# Patient Record
Sex: FEMALE | Race: WHITE | NOT HISPANIC OR LATINO | Employment: OTHER | URBAN - NONMETROPOLITAN AREA
[De-identification: names, ages, dates, MRNs, and addresses within clinical notes are randomized per-mention and may not be internally consistent; named-entity substitution may affect disease eponyms.]

---

## 2017-01-24 NOTE — TELEPHONE ENCOUNTER
Was the patient seen in the last year in this department? yes    Does patient have an active prescription for medications requested? Yes     Received Request Via: Pharmacy

## 2017-01-25 RX ORDER — LEVOTHYROXINE SODIUM 112 UG/1
112 TABLET ORAL
Qty: 30 TAB | Refills: 11 | Status: SHIPPED | OUTPATIENT
Start: 2017-01-25 | End: 2018-01-08 | Stop reason: SDUPTHER

## 2017-02-27 ENCOUNTER — TELEMEDICINE ORIGINATING SITE VISIT (OUTPATIENT)
Dept: MEDICAL GROUP | Facility: CLINIC | Age: 57
End: 2017-02-27
Payer: COMMERCIAL

## 2017-02-27 ENCOUNTER — TELEMEDICINE2 (OUTPATIENT)
Dept: MEDICAL GROUP | Age: 57
End: 2017-02-27
Payer: COMMERCIAL

## 2017-02-27 ENCOUNTER — NON-PROVIDER VISIT (OUTPATIENT)
Dept: MEDICAL GROUP | Facility: CLINIC | Age: 57
End: 2017-02-27
Payer: COMMERCIAL

## 2017-02-27 VITALS
HEART RATE: 66 BPM | BODY MASS INDEX: 40.56 KG/M2 | HEIGHT: 66 IN | SYSTOLIC BLOOD PRESSURE: 126 MMHG | RESPIRATION RATE: 16 BRPM | OXYGEN SATURATION: 93 % | WEIGHT: 252.4 LBS | DIASTOLIC BLOOD PRESSURE: 86 MMHG | TEMPERATURE: 97.4 F

## 2017-02-27 DIAGNOSIS — R73.9 BLOOD GLUCOSE ELEVATED: ICD-10-CM

## 2017-02-27 DIAGNOSIS — E55.9 VITAMIN D DEFICIENCY: ICD-10-CM

## 2017-02-27 DIAGNOSIS — I48.0 PAF (PAROXYSMAL ATRIAL FIBRILLATION) (HCC): ICD-10-CM

## 2017-02-27 DIAGNOSIS — R73.9 HYPERGLYCEMIA: ICD-10-CM

## 2017-02-27 DIAGNOSIS — I25.83 CORONARY ARTERY DISEASE DUE TO LIPID RICH PLAQUE: ICD-10-CM

## 2017-02-27 DIAGNOSIS — I25.10 CORONARY ARTERY DISEASE DUE TO LIPID RICH PLAQUE: ICD-10-CM

## 2017-02-27 DIAGNOSIS — R53.83 FATIGUE, UNSPECIFIED TYPE: ICD-10-CM

## 2017-02-27 DIAGNOSIS — E78.5 DYSLIPIDEMIA: ICD-10-CM

## 2017-02-27 LAB
HBA1C MFR BLD: 6.1 % (ref ?–5.8)
INT CON NEG: NEGATIVE
INT CON POS: POSITIVE

## 2017-02-27 PROCEDURE — 99214 OFFICE O/P EST MOD 30 MIN: CPT | Mod: GT | Performed by: INTERNAL MEDICINE

## 2017-02-27 RX ORDER — EPINEPHRINE 0.3 MG/.3ML
INJECTION INTRAMUSCULAR
Status: ON HOLD | COMMUNITY
Start: 2016-12-27 | End: 2022-09-26

## 2017-02-27 NOTE — PROGRESS NOTES
CC: Here to review labwork    Verified identification with patient. Secured video conference with RN presenter in [facility]      HPI:   Gisel presents today with the following.    1. PAF (paroxysmal atrial fibrillation) (CMS-HCC)  Patient followed up with cardiology in December. Patient stable. Continue current plan of care which includes Lopressor, Lipitor. Lipid panel needed    2. Coronary artery disease due to lipid rich plaque  Stable. Cardiology note reviewed.    3. Dyslipidemia  Taking Lipitor 10 mg daily    4. Hyperglycemia  Diet control. Patient eliminated, soda,, ice cream, or diarrhea. Patient works 4 months out of the year so having difficulty with food prep. Has lost 3 more pounds. Job is sedentary    5. Vitamin D deficiency  Has been taking vitamin D3, 5000 units daily. Ran out of her supplement one week ago. No recent labs          Patient Active Problem List    Diagnosis Date Noted   • Vitamin D deficiency 02/27/2017   • Coronary artery disease, non-occlusive 12/16/2016   • Dyslipidemia 12/16/2016   • Hyperglycemia 10/31/2016   • Acute bronchitis due to infection 10/03/2016   • Hypothyroidism 06/06/2016   • SVT (supraventricular tachycardia) (CMS-HCC) 10/10/2014   • Sleep disorder 06/20/2014   • CAD (coronary artery disease) 12/13/2013   • PAF (paroxysmal atrial fibrillation) (CMS-HCC) 12/13/2013   • Obese 12/13/2013       Current Outpatient Prescriptions   Medication Sig Dispense Refill   • EPIPEN 2-YVROSE 0.3 MG/0.3ML Solution Auto-injector solution for injection      • levothyroxine (SYNTHROID) 112 MCG Tab Take 1 Tab by mouth every morning before breakfast. 30 Tab 11   • AFLURIA Suspension injection      • amoxicillin (AMOXIL) 875 MG tablet Take 1 Tab by mouth 2 times a day. 20 Tab 0   • albuterol 108 (90 BASE) MCG/ACT Aero Soln inhalation aerosol Inhale 2 Puffs by mouth every 6 hours as needed for Shortness of Breath. 8.5 g 1   • metoprolol (LOPRESSOR) 25 MG Tab Take 1 Tab by mouth 2 times a day.  "180 Tab 3   • atorvastatin (LIPITOR) 10 MG Tab Take 1 Tab by mouth every evening. 90 Tab 3   • amoxicillin (AMOXIL) 875 MG tablet Take 1 Tab by mouth 2 times a day. 20 Tab 0   • vitamin D (CHOLECALCIFEROL) 1000 UNIT TABS Take 1,000 Units by mouth every day.     • aspirin (ASA) 81 MG CHEW Take 1 Tab by mouth every day. 100 Tab      No current facility-administered medications for this visit.         Allergies as of 02/27/2017 - [place] as Reviewed 02/27/2017   Allergen Reaction Noted   • Doxycycline  10/10/2013        ROS: As per HPI. Denies cardiopulmonary, GI, neurologic symptoms    /86 mmHg  Pulse 66  Temp(Src) 36.3 °C (97.4 °F)  Resp 16  Ht 1.676 m (5' 5.98\")  Wt 114.488 kg (252 lb 6.4 oz)  BMI 40.76 kg/m2  SpO2 93%    Physical Exam:  Gen:         Alert and oriented, No apparent distress.      Assessment and Plan.   56 y.o. female with the following issues.    1. PAF (paroxysmal atrial fibrillation) (CMS-HCC)  Stable continue current plan of care  Follow up with cardiology in May    2. Coronary artery disease due to lipid rich plaque  Stable, continue current plan of care  Follow-up cardiology  Lipid panel ordered    3. Dyslipidemia    - LIPID PROFILE; Future    4. Hyperglycemia  Hemoglobin A1c 6.1. Down from 6.4. Briefly counseled patient on options for diet and lifestyle medications. Patient will take healthy food options in a cooler. Patient will start bike riding in April. Has a Fitbit, and discussed the goal for 10,000 steps daily  Continue weight loss efforts  Recheck hemoglobin A1c in 6 months    - COMP METABOLIC PANEL; Future    5. Vitamin D deficiency  Will call patient with results to adjust vitamin D supplements    - VITAMIN D,25 HYDROXY; Future    6. Fatigue, unspecified type    - CBC WITH DIFFERENTIAL; Future      7. Prevention. Patient will complete her mammogram within the next month    Total visit time, 25 minutes, time spent in lab review, diet and lifestyle modification counseling in " subspecialty review      Please note that this dictation was created using voice recognition software. I have made every reasonable attempt to correct obvious errors, but expect that there are errors of grammar and possible content that I did not discover before finalizing note.

## 2017-02-27 NOTE — MR AVS SNAPSHOT
"        Gisel Montez   2017 12:00 PM   Telemedicine2   MRN: [id number]    Department:  [address] [place] [address]   Dept Phone:  [phone]    Description:  Female : 1960   Provider:  Mattie Galdamez M.D.; TELEMED [facility]           Reason for Visit     Follow-Up Diabetes    Medication Refill Vitamin D?      Allergies as of 2017     Allergen Noted Reactions    Doxycycline 10/10/2013         You were diagnosed with     PAF (paroxysmal atrial fibrillation) (CMS-[facility])   [963297]       Coronary artery disease due to lipid rich plaque   [7690398]       Dyslipidemia   [881396]       Hyperglycemia   [805253]       Vitamin D deficiency   [6348181]       Fatigue, unspecified type   [0073180]         Vital Signs     Blood Pressure Pulse Temperature Respirations Height Weight    126/86 mmHg 66 36.3 °C (97.4 °F) 16 1.676 m (5' 5.98\") 114.488 kg (252 lb 6.4 oz)    Body Mass Index Oxygen Saturation Smoking Status             40.76 kg/m2 93% Former Smoker         Basic Information     Date Of Birth Sex Race Ethnicity Preferred Language    1960 Female White Non- English      Your appointments     2017 12:30 PM   Non Provider 1 with [place] [facility] (--)    [address] [facility] [address]   [phone]           You will be receiving a confirmation call a few days before your appointment from our automated call confirmation system.            May 05, 2017 10:40 AM   FOLLOW UP with Antonio House M.D.   [facility] -  (--)    [address] [facility] [address]   [phone]            Aug 28, 2017 12:00 PM   Telemedicine Clinic Established Pt with Matt[facility] Galdamze M.D., [facility] [address])    [address] [place] [id number] [phone]              Problem List              ICD-10-CM Priority Class Noted - Resolved    CAD (coronary artery disease) I25.10   2013 - " Present    PAF (paroxysmal atrial fibrillation) (CMS-HCC) I48.0   12/13/2013 - Present    Obese E66.9   12/13/2013 - Present    Sleep disorder G47.9   6/20/2014 - Present    SVT (supraventricular tachycardia) (CMS-HCC) I47.1   10/10/2014 - Present    Hypothyroidism E03.9   6/6/2016 - Present    Acute bronchitis due to infection J20.8   10/3/2016 - Present    Hyperglycemia R73.9   10/31/2016 - Present    Coronary artery disease, non-occlusive I25.10   12/16/2016 - Present    Dyslipidemia E78.5   12/16/2016 - Present    Vitamin D deficiency E55.9   2/27/2017 - Present      Health Maintenance        Date Due Completion Dates    IMM DTaP/Tdap/Td Vaccine (1 - Tdap) 9/9/1980 ---    PAP SMEAR 9/9/1982 ---    IMM INFLUENZA (1) 9/1/2016 10/22/2014, 10/11/2013    MAMMOGRAM 12/15/2016 12/15/2015 (Done)    Override on 12/15/2015: Done    COLONOSCOPY 6/14/2026 6/14/2016 (Done)    Override on 6/14/2016: Done (diverticulosis)            Current Immunizations     Influenza TIV (IM) 10/11/2013  9:28 AM    Influenza Vaccine Quad Inj (Pf) 10/22/2014  8:52 AM      Below and/or attached are the medications your provider expects you to take. Review all of your home medications and newly ordered medications with your provider and/or pharmacist. Follow medication instructions as directed by your provider and/or pharmacist. Please keep your medication list with you and share with your provider. Update the information when medications are discontinued, doses are changed, or new medications (including over-the-counter products) are added; and carry medication information at all times in the event of emergency situations     Allergies:  DOXYCYCLINE - (reactions not documented)               Medications  Valid as of: February 27, 2017 - 12:20 PM    Generic Name Brand Name Tablet Size Instructions for use    Albuterol Sulfate (Aero Soln) albuterol 108 (90 BASE) MCG/ACT Inhale 2 Puffs by mouth every 6 hours as needed for Shortness of Breath.          Amoxicillin (Tab) AMOXIL 875 MG Take 1 Tab by mouth 2 times a day.        Amoxicillin (Tab) AMOXIL 875 MG Take 1 Tab by mouth 2 times a day.        Aspirin (Chew Tab) ASA 81 MG Take 1 Tab by mouth every day.        Atorvastatin Calcium (Tab) LIPITOR 10 MG Take 1 Tab by mouth every evening.        Cholecalciferol (Tab) cholecalciferol 1000 UNIT Take 1,000 Units by mouth every day.        EPINEPHrine (Solution Auto-injector) EPIPEN 2-YVROSE 0.3 MG/0.3ML         Influenza Virus Vaccine Split (Suspension) AFLURIA          Levothyroxine Sodium (Tab) SYNTHROID 112 MCG Take 1 Tab by mouth every morning before breakfast.        Metoprolol Tartrate (Tab) LOPRESSOR 25 MG Take 1 Tab by mouth 2 times a day.        .                 Medicines prescribed today were sent to:     [id number] - [place], [place] - [address]    [address] [place] [address]    Phone: [phone] Fax: [phone]    Open 24 Hours?: No      Medication refill instructions:       If your prescription bottle indicates you have medication refills left, it is not necessary to call your provider’s office. Please contact your pharmacy and they will refill your medication.    If your prescription bottle indicates you do not have any refills left, you may request refills at any time through one of the following ways: The online [employer] system (except Urgent Care), by calling your provider’s office, or by asking your pharmacy to contact your provider’s office with a refill request. Medication refills are processed only during regular business hours and may not be available until the next business day. Your provider may request additional information or to have a follow-up visit with you prior to refilling your medication.   *Please Note: Medication refills are assigned a new Rx number when refilled electronically. Your pharmacy may indicate that no refills were authorized even though a new prescription for the same medication is available at the  pharmacy. Please request the medicine by name with the pharmacy before contacting your provider for a refill.        Your To Do List     Future Labs/Procedures Complete By Expires    CBC WITH DIFFERENTIAL  As directed 2/28/2018    COMP METABOLIC PANEL  As directed 2/28/2018    LIPID PROFILE  As directed 2/28/2018    VITAMIN D,25 HYDROXY  As directed 2/27/2018         MyChart Access Code: Activation code not generated  Current MyChart Status: Active

## 2017-02-27 NOTE — PROGRESS NOTES
Gisel Montez is a 56 y.o. female here for a non-provider visit for A1C    If abnormal was an in office provider notified today (if so, indicate provider)? Yes  Routed to PCP? Yes

## 2017-02-27 NOTE — MR AVS SNAPSHOT
Gisel Montez   2017 12:30 PM   Non-Provider Visit   MRN: [id number]    Department:  [facility]   Dept Phone:  [phone]    Description:  Female : 1960   Provider:  LETTY LINDA           Reason for Visit     Diabetes           Allergies as of 2017     Allergen Noted Reactions    Doxycycline 10/10/2013         You were diagnosed with     Blood glucose elevated   [812745]         Vital Signs     Smoking Status                   Former Smoker           Basic Information     Date Of Birth Sex Race Ethnicity Preferred Language    1960 Female White Non- English      Your appointments     2017 12:30 PM   Non Provider 1 with [place] LINDA   [facility] (--)    [address] [facility] [address] [phone]           You will be receiving a confirmation call a few days before your appointment from our automated call confirmation system.            May 05, 2017 10:40 AM   FOLLOW UP with Antonio House M.D.   [facility] -  (--)    [address] [facility] [id number] [phone]              Problem List              ICD-10-CM Priority Class Noted - Resolved    CAD (coronary artery disease) I25.10   2013 - Present    PAF (paroxysmal atrial fibrillation) (CMS-HCC) I48.0   2013 - Present    Obese E66.9   2013 - Present    Sleep disorder G47.9   2014 - Present    SVT (supraventricular tachycardia) (CMS-HCC) I47.1   10/10/2014 - Present    Hypothyroidism E03.9   2016 - Present    Acute bronchitis due to infection J20.8   10/3/2016 - Present    Hyperglycemia R73.9   10/31/2016 - Present    Coronary artery disease, non-occlusive I25.10   2016 - Present    Dyslipidemia E78.5   2016 - Present    Vitamin D deficiency E55.9   2017 - Present      Health Maintenance        Date Due Completion Dates    IMM DTaP/Tdap/Td Vaccine (1 - Tdap) 1980 ---    PAP SMEAR 1982  ---    IMM INFLUENZA (1) 9/1/2016 10/22/2014, 10/11/2013    MAMMOGRAM 12/15/2016 12/15/2015 (Done)    Override on 12/15/2015: Done    COLONOSCOPY 6/14/2026 6/14/2016 (Done)    Override on 6/14/2016: Done (diverticulosis)            Results     POCT A1C      Component    Glycohemoglobin    6.1    Internal Control Negative    Negative    Internal Control Positive    Positive                        Current Immunizations     Influenza TIV (IM) 10/11/2013  9:28 AM    Influenza Vaccine Quad Inj (Pf) 10/22/2014  8:52 AM      Below and/or attached are the medications your provider expects you to take. Review all of your home medications and newly ordered medications with your provider and/or pharmacist. Follow medication instructions as directed by your provider and/or pharmacist. Please keep your medication list with you and share with your provider. Update the information when medications are discontinued, doses are changed, or new medications (including over-the-counter products) are added; and carry medication information at all times in the event of emergency situations     Allergies:  DOXYCYCLINE - (reactions not documented)               Medications  Valid as of: February 27, 2017 - 12:15 PM    Generic Name Brand Name Tablet Size Instructions for use    Albuterol Sulfate (Aero Soln) albuterol 108 (90 BASE) MCG/ACT Inhale 2 Puffs by mouth every 6 hours as needed for Shortness of Breath.        Amoxicillin (Tab) AMOXIL 875 MG Take 1 Tab by mouth 2 times a day.        Amoxicillin (Tab) AMOXIL 875 MG Take 1 Tab by mouth 2 times a day.        Aspirin (Chew Tab) ASA 81 MG Take 1 Tab by mouth every day.        Atorvastatin Calcium (Tab) LIPITOR 10 MG Take 1 Tab by mouth every evening.        Cholecalciferol (Tab) cholecalciferol 1000 UNIT Take 1,000 Units by mouth every day.        EPINEPHrine (Solution Auto-injector) EPIPEN 2-YVROSE 0.3 MG/0.3ML         Influenza Virus Vaccine Split (Suspension) AFLURIA          Levothyroxine  Sodium (Tab) SYNTHROID 112 MCG Take 1 Tab by mouth every morning before breakfast.        Metoprolol Tartrate (Tab) LOPRESSOR 25 MG Take 1 Tab by mouth 2 times a day.        .                 Medicines prescribed today were sent to:     [facility] - [place], [place] - [address]    [address] [place] [address]    Phone: [phone] Fax: [phone]    Open 24 Hours?: No      Medication refill instructions:       If your prescription bottle indicates you have medication refills left, it is not necessary to call your provider’s office. Please contact your pharmacy and they will refill your medication.    If your prescription bottle indicates you do not have any refills left, you may request refills at any time through one of the following ways: The online [employer] system (except Urgent Care), by calling your provider’s office, or by asking your pharmacy to contact your provider’s office with a refill request. Medication refills are processed only during regular business hours and may not be available until the next business day. Your provider may request additional information or to have a follow-up visit with you prior to refilling your medication.   *Please Note: Medication refills are assigned a new Rx number when refilled electronically. Your pharmacy may indicate that no refills were authorized even though a new prescription for the same medication is available at the pharmacy. Please request the medicine by name with the pharmacy before contacting your provider for a refill.           [employer] Access Code: Activation code not generated  Current [employer] Status: Active

## 2017-04-20 ENCOUNTER — NON-PROVIDER VISIT (OUTPATIENT)
Dept: MEDICAL GROUP | Facility: CLINIC | Age: 57
End: 2017-04-20
Payer: COMMERCIAL

## 2017-04-20 DIAGNOSIS — E78.5 DYSLIPIDEMIA: ICD-10-CM

## 2017-04-20 PROCEDURE — 36415 COLL VENOUS BLD VENIPUNCTURE: CPT | Performed by: FAMILY MEDICINE

## 2017-04-20 NOTE — MR AVS SNAPSHOT
Gisel Montez   2017 8:00 AM   Non-Provider Visit   MRN: [id number]    Department:  [facility]   Dept Phone:  [phone]    Description:  Female : 1960   Provider:  LETTY LINDA           Reason for Visit     Other Venipuncture      Allergies as of 2017     Allergen Noted Reactions    Doxycycline 10/10/2013         You were diagnosed with     Dyslipidemia   [960167]         Vital Signs     Smoking Status                   Former Smoker           Basic Information     Date Of Birth Sex Race Ethnicity Preferred Language    1960 Female White Non- English      Your appointments     2017  8:00 AM   Non Provider 1 with [place] LINDA   [facility] (--)    [address] [facility] [place] [address] [phone]           You will be receiving a confirmation call a few days before your appointment from our automated call confirmation system.            May 05, 2017 10:40 AM   FOLLOW UP with Antonio House M.D.   [facility] -  (--)    [address] [facility] [address] [phone]            Aug 28, 2017 12:00 PM   Telemedicine Clinic Established Pt with Mattie Galdamez M.D., [facility] [address] [place] [id number] [phone]              Problem List              ICD-10-CM Priority Class Noted - Resolved    CAD (coronary artery disease) I25.10   2013 - Present    PAF (paroxysmal atrial fibrillation) (CMS-HCC) I48.0   2013 - Present    Obese E66.9   2013 - Present    Sleep disorder G47.9   2014 - Present    SVT (supraventricular tachycardia) (CMS-HCC) I47.1   10/10/2014 - Present    Hypothyroidism E03.9   2016 - Present    Acute bronchitis due to infection J20.8   10/3/2016 - Present    Hyperglycemia R73.9   10/31/2016 - Present    Coronary artery disease, non-occlusive I25.10   2016 - Present    Dyslipidemia  E78.5   12/16/2016 - Present    Vitamin D deficiency E55.9   2/27/2017 - Present      Health Maintenance        Date Due Completion Dates    IMM DTaP/Tdap/Td Vaccine (1 - Tdap) 9/9/1980 ---    PAP SMEAR 9/9/1982 ---    MAMMOGRAM 12/15/2016 12/15/2015 (Done)    Override on 12/15/2015: Done    COLONOSCOPY 6/14/2026 6/14/2016 (Done)    Override on 6/14/2016: Done (diverticulosis)            Current Immunizations     Influenza TIV (IM) 10/11/2013  9:28 AM    Influenza Vaccine Quad Inj (Pf) 10/22/2014  8:52 AM      Below and/or attached are the medications your provider expects you to take. Review all of your home medications and newly ordered medications with your provider and/or pharmacist. Follow medication instructions as directed by your provider and/or pharmacist. Please keep your medication list with you and share with your provider. Update the information when medications are discontinued, doses are changed, or new medications (including over-the-counter products) are added; and carry medication information at all times in the event of emergency situations     Allergies:  DOXYCYCLINE - (reactions not documented)               Medications  Valid as of: April 20, 2017 -  7:41 AM    Generic Name Brand Name Tablet Size Instructions for use    Albuterol Sulfate (Aero Soln) albuterol 108 (90 BASE) MCG/ACT Inhale 2 Puffs by mouth every 6 hours as needed for Shortness of Breath.        Amoxicillin (Tab) AMOXIL 875 MG Take 1 Tab by mouth 2 times a day.        Amoxicillin (Tab) AMOXIL 875 MG Take 1 Tab by mouth 2 times a day.        Aspirin (Chew Tab) ASA 81 MG Take 1 Tab by mouth every day.        Atorvastatin Calcium (Tab) LIPITOR 10 MG Take 1 Tab by mouth every evening.        Cholecalciferol (Tab) cholecalciferol 1000 UNIT Take 1,000 Units by mouth every day.        EPINEPHrine (Solution Auto-injector) EPIPEN 2-YVROSE 0.3 MG/0.3ML         Influenza Virus Vaccine Split (Suspension) AFLURIA          Levothyroxine Sodium  (Tab) SYNTHROID 112 MCG Take 1 Tab by mouth every morning before breakfast.        Metoprolol Tartrate (Tab) LOPRESSOR 25 MG Take 1 Tab by mouth 2 times a day.        .                 Medicines prescribed today were sent to:     [facility] - [place], [place] - [address]    [address] [place] [address]    Phone: [phone] Fax: [phone]    Open 24 Hours?: No      Medication refill instructions:       If your prescription bottle indicates you have medication refills left, it is not necessary to call your provider’s office. Please contact your pharmacy and they will refill your medication.    If your prescription bottle indicates you do not have any refills left, you may request refills at any time through one of the following ways: The online [employer] system (except Urgent Care), by calling your provider’s office, or by asking your pharmacy to contact your provider’s office with a refill request. Medication refills are processed only during regular business hours and may not be available until the next business day. Your provider may request additional information or to have a follow-up visit with you prior to refilling your medication.   *Please Note: Medication refills are assigned a new Rx number when refilled electronically. Your pharmacy may indicate that no refills were authorized even though a new prescription for the same medication is available at the pharmacy. Please request the medicine by name with the pharmacy before contacting your provider for a refill.           [employer] Access Code: Activation code not generated  Current [employer] Status: Active

## 2017-04-21 LAB
25(OH)D3+25(OH)D2 SERPL-MCNC: 52.1 NG/ML (ref 30–100)
ALBUMIN SERPL-MCNC: 3.9 G/DL (ref 3.5–5.5)
ALBUMIN/GLOB SERPL: 0.9 {RATIO} (ref 1.2–2.2)
ALP SERPL-CCNC: 92 IU/L (ref 39–117)
ALT SERPL-CCNC: 19 IU/L (ref 0–32)
AST SERPL-CCNC: 26 IU/L (ref 0–40)
BASOPHILS # BLD AUTO: 0 X10E3/UL (ref 0–0.2)
BASOPHILS NFR BLD AUTO: 0 %
BILIRUB SERPL-MCNC: 0.5 MG/DL (ref 0–1.2)
BUN SERPL-MCNC: 11 MG/DL (ref 6–24)
BUN/CREAT SERPL: 17 (ref 9–23)
CALCIUM SERPL-MCNC: 8.9 MG/DL (ref 8.7–10.2)
CHD RISK SERPL-RTO: < 0.5 TIMES AVG. (ref 0–1)
CHLORIDE SERPL-SCNC: 103 MMOL/L (ref 96–106)
CHOLEST SERPL-MCNC: 110 MG/DL (ref 100–199)
CHOLEST/HDLC SERPL: 3.1 RATIO UNITS (ref 0–4.4)
COMMENT 011824: ABNORMAL
CREAT SERPL-MCNC: 0.64 MG/DL (ref 0.57–1)
EOSINOPHIL # BLD AUTO: 0.1 X10E3/UL (ref 0–0.4)
EOSINOPHIL NFR BLD AUTO: 1 %
ERYTHROCYTE [DISTWIDTH] IN BLOOD BY AUTOMATED COUNT: 14.3 % (ref 12.3–15.4)
GGT SERPL-CCNC: 22 IU/L (ref 0–60)
GLOBULIN SER CALC-MCNC: 4.5 G/DL (ref 1.5–4.5)
GLUCOSE SERPL-MCNC: 132 MG/DL (ref 65–99)
HCT VFR BLD AUTO: 45 % (ref 34–46.6)
HDLC SERPL-MCNC: 36 MG/DL
HGB BLD-MCNC: 14.9 G/DL (ref 11.1–15.9)
IMM GRANULOCYTES # BLD: 0 X10E3/UL (ref 0–0.1)
IMM GRANULOCYTES NFR BLD: 0 %
IMMATURE CELLS  115398: NORMAL
IRON SERPL-MCNC: 65 UG/DL (ref 27–159)
LDH SERPL-CCNC: 160 IU/L (ref 119–226)
LDLC SERPL CALC-MCNC: 52 MG/DL (ref 0–99)
LYMPHOCYTES # BLD AUTO: 3 X10E3/UL (ref 0.7–3.1)
LYMPHOCYTES NFR BLD AUTO: 40 %
MCH RBC QN AUTO: 29.9 PG (ref 26.6–33)
MCHC RBC AUTO-ENTMCNC: 33.1 G/DL (ref 31.5–35.7)
MCV RBC AUTO: 90 FL (ref 79–97)
MONOCYTES # BLD AUTO: 0.5 X10E3/UL (ref 0.1–0.9)
MONOCYTES NFR BLD AUTO: 6 %
MORPHOLOGY BLD-IMP: NORMAL
NEUTROPHILS # BLD AUTO: 3.8 X10E3/UL (ref 1.4–7)
NEUTROPHILS NFR BLD AUTO: 53 %
NRBC BLD AUTO-RTO: NORMAL %
PHOSPHATE SERPL-MCNC: 3.5 MG/DL (ref 2.5–4.5)
PLATELET # BLD AUTO: 223 X10E3/UL (ref 150–379)
POTASSIUM SERPL-SCNC: 4.6 MMOL/L (ref 3.5–5.2)
PROT SERPL-MCNC: 8.4 G/DL (ref 6–8.5)
RBC # BLD AUTO: 4.98 X10E6/UL (ref 3.77–5.28)
SODIUM SERPL-SCNC: 140 MMOL/L (ref 134–144)
TRIGL SERPL-MCNC: 110 MG/DL (ref 0–149)
URATE SERPL-MCNC: 6.8 MG/DL (ref 2.5–7.1)
VLDLC SERPL CALC-MCNC: 22 MG/DL (ref 5–40)
WBC # BLD AUTO: 7.4 X10E3/UL (ref 3.4–10.8)

## 2017-05-05 ENCOUNTER — OFFICE VISIT (OUTPATIENT)
Dept: CARDIOLOGY | Facility: CLINIC | Age: 57
End: 2017-05-05
Payer: COMMERCIAL

## 2017-05-05 VITALS
HEART RATE: 67 BPM | DIASTOLIC BLOOD PRESSURE: 70 MMHG | BODY MASS INDEX: 37.92 KG/M2 | WEIGHT: 256 LBS | OXYGEN SATURATION: 96 % | SYSTOLIC BLOOD PRESSURE: 100 MMHG | HEIGHT: 69 IN

## 2017-05-05 DIAGNOSIS — E78.5 DYSLIPIDEMIA: ICD-10-CM

## 2017-05-05 DIAGNOSIS — I47.10 SVT (SUPRAVENTRICULAR TACHYCARDIA): ICD-10-CM

## 2017-05-05 DIAGNOSIS — I25.10 CORONARY ARTERY DISEASE, NON-OCCLUSIVE: ICD-10-CM

## 2017-05-05 DIAGNOSIS — R00.2 PALPITATIONS: ICD-10-CM

## 2017-05-05 PROCEDURE — 99214 OFFICE O/P EST MOD 30 MIN: CPT | Performed by: INTERNAL MEDICINE

## 2017-05-05 ASSESSMENT — ENCOUNTER SYMPTOMS
MYALGIAS: 0
SHORTNESS OF BREATH: 0
PND: 0
ORTHOPNEA: 0
PALPITATIONS: 0
DIZZINESS: 0
LOSS OF CONSCIOUSNESS: 0

## 2017-05-05 NOTE — PROGRESS NOTES
Subjective:   Gisel Montez is a 56 y.o. female who presents today for follow up evaluation for CAD, previous inferior myocardial infarction, PAF, SVT, palpitations and hyperlipidemia.    Last seen on 12/16/2016.    Since 12/16/2016 the patient has had no cardiac symptoms. No angina pectoris or palpitations.  Recent lipid panel was normal.    Since 7/21/2016 appointment the patient's had no cardiac symptoms.  Tolerating and compliant with medications.  Follow-up with PCP Dr. Arambula who placed the patient on a diet.  Patient has lost 10 pounds.    Has new PCP with [facility] Dr Lou Arambula    Since last appointment in 7/2015 the patient stopped cigarette 7 months ago.  She didn't along with a friend.  She is also altered her dietary habits.  No new cardiac symptoms.  Still doesn't have a PCP in [place], [place]    Past medical history  Saw Dr.Steve Barton, hematologist for elevated serum globulins.  Being monitored.  Stopped Plavix in May 2015.    On 10/21/2014 seen at [facility].  Chest pain. Normal troponin. Fixed defect on MPI.  Discharged on medications.     On 10/14/2013 discharged from [facility] after an STEMI inferior MI.  Received TNK in [place], [place] and transferred to [facility].  Cardiac catheterization on 10/10/2013 showed mild CAD and inferior wall hypokinesis. EF was 65%..  Subsequently developed atrial fibrillation requiring electrical cardioversion on 10/13/2013.     Past Medical History   Diagnosis Date   • CAD (coronary artery disease)     • Acute MI, inferior wall (CMS-HCC)     • PAF (paroxysmal atrial fibrillation) (CMS-HCC)     • Palpitations     • ACTIVE SMOKER       4-10 sticks/40   • Hypothyroidism     • Cardiomyopathy (CMS-HCC)    • Acute bronchitis due to infection 10/3/2016   • Hyperglycemia 10/31/2016   • Vitamin D deficiency 2/27/2017     Past Surgical History   Procedure Laterality Date   • Gyn surgery       tubal ligation   • Other       choleycystectomy     Family History   Problem  "Relation Age of Onset   • Diabetes Father 68     History   Smoking status   • Former Smoker -- 0.50 packs/day for 35 years   • Types: Cigarettes   • Quit date: 05/29/2015   Smokeless tobacco   • Never Used     Allergies   Allergen Reactions   • Doxycycline      Outpatient Encounter Prescriptions as of 5/5/2017   Medication Sig Dispense Refill   • CINNAMON PO Take  by mouth.     • EPIPEN 2-YVROSE 0.3 MG/0.3ML Solution Auto-injector solution for injection      • levothyroxine (SYNTHROID) 112 MCG Tab Take 1 Tab by mouth every morning before breakfast. 30 Tab 11   • metoprolol (LOPRESSOR) 25 MG Tab Take 1 Tab by mouth 2 times a day. 180 Tab 3   • atorvastatin (LIPITOR) 10 MG Tab Take 1 Tab by mouth every evening. 90 Tab 3   • vitamin D (CHOLECALCIFEROL) 1000 UNIT TABS Take 1,000 Units by mouth every day.     • aspirin (ASA) 81 MG CHEW Take 1 Tab by mouth every day. 100 Tab    • AFLURIA Suspension injection      • amoxicillin (AMOXIL) 875 MG tablet Take 1 Tab by mouth 2 times a day. 20 Tab 0   • albuterol 108 (90 BASE) MCG/ACT Aero Soln inhalation aerosol Inhale 2 Puffs by mouth every 6 hours as needed for Shortness of Breath. 8.5 g 1   • amoxicillin (AMOXIL) 875 MG tablet Take 1 Tab by mouth 2 times a day. 20 Tab 0     No facility-administered encounter medications on file as of 5/5/2017.     Review of Systems   Respiratory: Negative for shortness of breath.    Cardiovascular: Negative for chest pain, palpitations, orthopnea, leg swelling and PND.   Musculoskeletal: Negative for myalgias.   Neurological: Negative for dizziness and loss of consciousness.        Objective:   /70 mmHg  Pulse 67  Ht 1.753 m (5' 9.02\")  Wt 116.121 kg (256 lb)  BMI 37.79 kg/m2  SpO2 96%    Physical Exam   Constitutional: She is oriented to person, place, and time. She appears well-nourished. No distress.   HENT:   Mouth/Throat: Mucous membranes are normal.   Neck: No JVD present. Carotid bruit is not present.   Normal jugular venous " pressure.   Cardiovascular: Normal rate, regular rhythm, S1 normal and S2 normal.  Exam reveals no gallop and no friction rub.    No murmur heard.  Pulses:       Carotid pulses are 2+ on the right side, and 2+ on the left side.       Radial pulses are 2+ on the right side, and 2+ on the left side.        Posterior tibial pulses are 1+ on the right side, and 1+ on the left side.       Pulmonary/Chest: Effort normal and breath sounds normal. She has no wheezes. She has no rhonchi. She has no rales.   Increased AP diameter.   Musculoskeletal: She exhibits no edema.   Neurological: She is alert and oriented to person, place, and time.   Skin: Skin is warm and dry. No cyanosis. Nails show no clubbing.   Psychiatric: She has a normal mood and affect. Her behavior is normal.   10/13/2013 EKG: normal sinus rhythm, rate 75. Inferolateral myocardial infarction. Low voltage. EKG reviewed by myself.      Ref. Range 10/11/2013 03:38   TSH Latest Range: 0.300-3.700 uIU/mL 1.090      Ref. Range 4/20/2017 07:30   Cholesterol,Tot Latest Ref Range: 100-199 mg/dL 110   Triglycerides Latest Ref Range: 0-149 mg/dL 110   HDL Latest Ref Range: >39 mg/dL 36 (L)   LDL Latest Ref Range: 0-99 mg/dL 52       11/04/2014 Lab: AST 25  ALT 26  TP 8.5 GLOBULIN 5.2 ALB 3.3  12/17/2015 Lab: Cholesterol 104. Triglycerides 108. HDL 36. LDL 68.    10/12/2013 Echocardiogram  Left ventricular ejection fraction is 55% to 60%.  Mild concentric left ventricular hypertrophy.  Mildly dilated left atrium.  Aortic sclerosis without stenosis.  Right ventricular systolic pressure is estimated to be 25 mmhg.    10/10/2013 Chest CTA  1. No pulmonary embolism  2. Mild atelectasis  3. Previous cholecystectomy  4. Atherosclerosis    2014 Cardiac event recorder: [employer]. 4 beasts SVT. 4 beats VT.       Assessment:     1. Coronary artery disease, non-occlusive     2. Dyslipidemia     3. SVT (supraventricular tachycardia)     4. Palpitations          Medical Decision  Making:  Today's Assessment / Status / Plan:      1. MI. 10/10/2013. Unclear mechanism. ? Cardiac embolic due to PAF.      2. PAF. Continue current therapy. On aspirin. Now off plavix.    3. Palpitations. Resolved.     4. Hyperlipidemia. LDL at goal.      5. SVT. No clinical recurrence.    6. VT. Continue beta blocker therapy.    7. Sleep disorder.      8. Elevated LFTs in 10/2014 now resolved.    9. Elevated globulin. Per Dr Barton.      10. Obesity. Losing weight.    11. Tobacco use. Stopped 7 months ago.    Recommendations  Review of most recent lipid panel with patient.  Advised to follow-up with Dr. Barton, hematologist.  Continue current therapy.  Follow-up 6 months.

## 2017-05-05 NOTE — Clinical Note
[facility]   [address] [place] [address]  Phone: [phone]  Fax: [phone]              Gisel Montez  1960    Encounter Date: 5/5/2017    Antonio House M.D.          PROGRESS NOTE:  Subjective:   Gisel Montez is a 56 y.o. female who presents today for follow up evaluation for CAD, previous inferior myocardial infarction, PAF, SVT, palpitations and hyperlipidemia.    Last seen on 12/16/2016.    Since 12/16/2016 the patient has had no cardiac symptoms. No angina pectoris or palpitations.  Recent lipid panel was normal.    Since 7/21/2016 appointment the patient's had no cardiac symptoms.  Tolerating and compliant with medications.  Follow-up with PCP Dr. Arambula who placed the patient on a diet.  Patient has lost 10 pounds.    Has new PCP with [facility] Dr Lou Arambula    Since last appointment in 7/2015 the patient stopped cigarette 7 months ago.  She didn't along with a friend.  She is also altered her dietary habits.  No new cardiac symptoms.  Still doesn't have a PCP in [place], [place]    Past medical history  Saw Dr.Steve Barton, hematologist for elevated serum globulins.  Being monitored.  Stopped Plavix in May 2015.    On 10/21/2014 seen at [facility].  Chest pain. Normal troponin. Fixed defect on MPI.  Discharged on medications.     On 10/14/2013 discharged from [facility] after an STEMI inferior MI.  Received TNK in [place], [place] and transferred to [facility].  Cardiac catheterization on 10/10/2013 showed mild CAD and inferior wall hypokinesis. EF was 65%..  Subsequently developed atrial fibrillation requiring electrical cardioversion on 10/13/2013.     Past Medical History   Diagnosis Date   • CAD (coronary artery disease)     • Acute MI, inferior wall (CMS-HCC)     • PAF (paroxysmal atrial fibrillation) (CMS-HCC)     • Palpitations     • ACTIVE SMOKER       4-10 sticks/40   • Hypothyroidism     • Cardiomyopathy (CMS-HCC)    •  "Acute bronchitis due to infection 10/3/2016   • Hyperglycemia 10/31/2016   • Vitamin D deficiency 2/27/2017     Past Surgical History   Procedure Laterality Date   • Gyn surgery       tubal ligation   • Other       choleycystectomy     Family History   Problem Relation Age of Onset   • Diabetes Father 68     History   Smoking status   • Former Smoker -- 0.50 packs/day for 35 years   • Types: Cigarettes   • Quit date: 05/29/2015   Smokeless tobacco   • Never Used     Allergies   Allergen Reactions   • Doxycycline      Outpatient Encounter Prescriptions as of 5/5/2017   Medication Sig Dispense Refill   • CINNAMON PO Take  by mouth.     • EPIPEN 2-YVROSE 0.3 MG/0.3ML Solution Auto-injector solution for injection      • levothyroxine (SYNTHROID) 112 MCG Tab Take 1 Tab by mouth every morning before breakfast. 30 Tab 11   • metoprolol (LOPRESSOR) 25 MG Tab Take 1 Tab by mouth 2 times a day. 180 Tab 3   • atorvastatin (LIPITOR) 10 MG Tab Take 1 Tab by mouth every evening. 90 Tab 3   • vitamin D (CHOLECALCIFEROL) 1000 UNIT TABS Take 1,000 Units by mouth every day.     • aspirin (ASA) 81 MG CHEW Take 1 Tab by mouth every day. 100 Tab    • AFLURIA Suspension injection      • amoxicillin (AMOXIL) 875 MG tablet Take 1 Tab by mouth 2 times a day. 20 Tab 0   • albuterol 108 (90 BASE) MCG/ACT Aero Soln inhalation aerosol Inhale 2 Puffs by mouth every 6 hours as needed for Shortness of Breath. 8.5 g 1   • amoxicillin (AMOXIL) 875 MG tablet Take 1 Tab by mouth 2 times a day. 20 Tab 0     No facility-administered encounter medications on file as of 5/5/2017.     Review of Systems   Respiratory: Negative for shortness of breath.    Cardiovascular: Negative for chest pain, palpitations, orthopnea, leg swelling and PND.   Musculoskeletal: Negative for myalgias.   Neurological: Negative for dizziness and loss of consciousness.        Objective:   /70 mmHg  Pulse 67  Ht 1.753 m (5' 9.02\")  Wt 116.121 kg (256 lb)  BMI 37.79 kg/m2  " SpO2 96%    Physical Exam   Constitutional: She is oriented to person, place, and time. She appears well-nourished. No distress.   HENT:   Mouth/Throat: Mucous membranes are normal.   Neck: No JVD present. Carotid bruit is not present.   Normal jugular venous pressure.   Cardiovascular: Normal rate, regular rhythm, S1 normal and S2 normal.  Exam reveals no gallop and no friction rub.    No murmur heard.  Pulses:       Carotid pulses are 2+ on the right side, and 2+ on the left side.       Radial pulses are 2+ on the right side, and 2+ on the left side.        Posterior tibial pulses are 1+ on the right side, and 1+ on the left side.       Pulmonary/Chest: Effort normal and breath sounds normal. She has no wheezes. She has no rhonchi. She has no rales.   Increased AP diameter.   Musculoskeletal: She exhibits no edema.   Neurological: She is alert and oriented to person, place, and time.   Skin: Skin is warm and dry. No cyanosis. Nails show no clubbing.   Psychiatric: She has a normal mood and affect. Her behavior is normal.   10/13/2013 EKG: normal sinus rhythm, rate 75. Inferolateral myocardial infarction. Low voltage. EKG reviewed by myself.      Ref. Range 10/11/2013 03:38   TSH Latest Range: 0.300-3.700 uIU/mL 1.090      Ref. Range 4/20/2017 07:30   Cholesterol,Tot Latest Ref Range: 100-199 mg/dL 110   Triglycerides Latest Ref Range: 0-149 mg/dL 110   HDL Latest Ref Range: >39 mg/dL 36 (L)   LDL Latest Ref Range: 0-99 mg/dL 52       11/04/2014 Lab: AST 25  ALT 26  TP 8.5 GLOBULIN 5.2 ALB 3.3  12/17/2015 Lab: Cholesterol 104. Triglycerides 108. HDL 36. LDL 68.    10/12/2013 Echocardiogram  Left ventricular ejection fraction is 55% to 60%.  Mild concentric left ventricular hypertrophy.  Mildly dilated left atrium.  Aortic sclerosis without stenosis.  Right ventricular systolic pressure is estimated to be 25 mmhg.    10/10/2013 Chest CTA  1. No pulmonary embolism  2. Mild atelectasis  3. Previous cholecystectomy  4.  Atherosclerosis    2014 Cardiac event recorder: [employer]. 4 beasts SVT. 4 beats VT.       Assessment:     1. Coronary artery disease, non-occlusive     2. Dyslipidemia     3. SVT (supraventricular tachycardia)     4. Palpitations          Medical Decision Making:  Today's Assessment / Status / Plan:      1. MI. 10/10/2013. Unclear mechanism. ? Cardiac embolic due to PAF.      2. PAF. Continue current therapy. On aspirin. Now off plavix.    3. Palpitations. Resolved.     4. Hyperlipidemia. LDL at goal.      5. SVT. No clinical recurrence.    6. VT. Continue beta blocker therapy.    7. Sleep disorder.      8. Elevated LFTs in 10/2014 now resolved.    9. Elevated globulin. Per Dr Barton.      10. Obesity. Losing weight.    11. Tobacco use. Stopped 7 months ago.    Recommendations  Review of most recent lipid panel with patient.  Advised to follow-up with Dr. Barton, hematologist.  Continue current therapy.  Follow-up 6 months.           Mattie Galdamez M.D.  [address]  [address]  [facility] [address]  VIA In Basket

## 2017-05-05 NOTE — MR AVS SNAPSHOT
"Gisel Montez   2017 10:40 AM   Office Visit   MRN: [id number]    Department:  [facility]   Dept Phone:  [phone]    Description:  Female : 1960   Provider:  Antonio House M.D.           Reason for Visit     Follow-Up           Allergies as of 2017     Allergen Noted Reactions    Doxycycline 10/10/2013         You were diagnosed with     Coronary artery disease, non-occlusive   [120579]       Dyslipidemia   [718079]       SVT (supraventricular tachycardia)   [962304]       Palpitations   [785.1.ICD-9-CM]         Vital Signs     Blood Pressure Pulse Height Weight Body Mass Index Oxygen Saturation    100/70 mmHg 67 1.753 m (5' 9.02\") 116.121 kg (256 lb) 37.79 kg/m2 96%    Smoking Status                   Former Smoker           Basic Information     Date Of Birth Sex Race Ethnicity Preferred Language    1960 Female White Non- English      Your appointments     Aug 28, 2017 12:00 PM   Telemedicine Clinic Established Pt with Mattie Galdamez M.D., TELEMED [address] [id number] [phone]              Problem List              ICD-10-CM Priority Class Noted - Resolved    PAF (paroxysmal atrial fibrillation) (CMS-HCC) I48.0   2013 - Present    Obese E66.9   2013 - Present    Sleep disorder G47.9   2014 - Present    SVT (supraventricular tachycardia) I47.1   10/10/2014 - Present    Hypothyroidism E03.9   2016 - Present    Acute bronchitis due to infection J20.8   10/3/2016 - Present    Hyperglycemia R73.9   10/31/2016 - Present    Coronary artery disease, non-occlusive I25.10   2016 - Present    Dyslipidemia E78.5   2016 - Present    Vitamin D deficiency E55.9   2017 - Present    Palpitations R00.2   2017 - Present      Health Maintenance        Date Due Completion Dates    IMM DTaP/Tdap/Td Vaccine (1 - Tdap) 1980 ---    PAP SMEAR 1982 ---   " MAMMOGRAM 12/15/2016 12/15/2015 (Done)    Override on 12/15/2015: Done    COLONOSCOPY 6/14/2026 6/14/2016 (Done)    Override on 6/14/2016: Done (diverticulosis)            Current Immunizations     Influenza TIV (IM) 10/11/2013  9:28 AM    Influenza Vaccine Quad Inj (Pf) 10/22/2014  8:52 AM      Below and/or attached are the medications your provider expects you to take. Review all of your home medications and newly ordered medications with your provider and/or pharmacist. Follow medication instructions as directed by your provider and/or pharmacist. Please keep your medication list with you and share with your provider. Update the information when medications are discontinued, doses are changed, or new medications (including over-the-counter products) are added; and carry medication information at all times in the event of emergency situations     Allergies:  DOXYCYCLINE - (reactions not documented)               Medications  Valid as of: May 08, 2017 -  8:15 AM    Generic Name Brand Name Tablet Size Instructions for use    Albuterol Sulfate (Aero Soln) albuterol 108 (90 BASE) MCG/ACT Inhale 2 Puffs by mouth every 6 hours as needed for Shortness of Breath.        Amoxicillin (Tab) AMOXIL 875 MG Take 1 Tab by mouth 2 times a day.        Amoxicillin (Tab) AMOXIL 875 MG Take 1 Tab by mouth 2 times a day.        Aspirin (Chew Tab) ASA 81 MG Take 1 Tab by mouth every day.        Atorvastatin Calcium (Tab) LIPITOR 10 MG Take 1 Tab by mouth every evening.        Cholecalciferol (Tab) cholecalciferol 1000 UNIT Take 1,000 Units by mouth every day.        Cinnamon   Take  by mouth.        EPINEPHrine (Solution Auto-injector) EPIPEN 2-YVROSE 0.3 MG/0.3ML         Influenza Virus Vaccine Split (Suspension) AFLURIA          Levothyroxine Sodium (Tab) SYNTHROID 112 MCG Take 1 Tab by mouth every morning before breakfast.        Metoprolol Tartrate (Tab) LOPRESSOR 25 MG Take 1 Tab by mouth 2 times a day.        .                    Medicines prescribed today were sent to:     [id number] - [place], [place] - [address]    [address] [place] [address]    Phone: [phone] Fax: [phone]    Open 24 Hours?: No      Medication refill instructions:       If your prescription bottle indicates you have medication refills left, it is not necessary to call your provider’s office. Please contact your pharmacy and they will refill your medication.    If your prescription bottle indicates you do not have any refills left, you may request refills at any time through one of the following ways: The online [employer] system (except Urgent Care), by calling your provider’s office, or by asking your pharmacy to contact your provider’s office with a refill request. Medication refills are processed only during regular business hours and may not be available until the next business day. Your provider may request additional information or to have a follow-up visit with you prior to refilling your medication.   *Please Note: Medication refills are assigned a new Rx number when refilled electronically. Your pharmacy may indicate that no refills were authorized even though a new prescription for the same medication is available at the pharmacy. Please request the medicine by name with the pharmacy before contacting your provider for a refill.           [employer] Access Code: Activation code not generated  Current [employer] Status: Active

## 2017-08-28 ENCOUNTER — TELEMEDICINE2 (OUTPATIENT)
Dept: MEDICAL GROUP | Age: 57
End: 2017-08-28
Payer: COMMERCIAL

## 2017-08-28 ENCOUNTER — NON-PROVIDER VISIT (OUTPATIENT)
Dept: MEDICAL GROUP | Facility: CLINIC | Age: 57
End: 2017-08-28
Payer: COMMERCIAL

## 2017-08-28 ENCOUNTER — TELEMEDICINE ORIGINATING SITE VISIT (OUTPATIENT)
Dept: MEDICAL GROUP | Facility: CLINIC | Age: 57
End: 2017-08-28
Payer: COMMERCIAL

## 2017-08-28 VITALS
DIASTOLIC BLOOD PRESSURE: 80 MMHG | OXYGEN SATURATION: 97 % | WEIGHT: 266 LBS | TEMPERATURE: 97.8 F | BODY MASS INDEX: 39.4 KG/M2 | RESPIRATION RATE: 16 BRPM | SYSTOLIC BLOOD PRESSURE: 132 MMHG | HEART RATE: 71 BPM | HEIGHT: 69 IN

## 2017-08-28 DIAGNOSIS — R73.9 HYPERGLYCEMIA: ICD-10-CM

## 2017-08-28 DIAGNOSIS — I25.10 CORONARY ARTERY DISEASE, NON-OCCLUSIVE: ICD-10-CM

## 2017-08-28 DIAGNOSIS — E11.9 TYPE 2 DIABETES MELLITUS WITHOUT COMPLICATION, WITHOUT LONG-TERM CURRENT USE OF INSULIN (HCC): ICD-10-CM

## 2017-08-28 DIAGNOSIS — E66.01 MORBID OBESITY DUE TO EXCESS CALORIES (HCC): ICD-10-CM

## 2017-08-28 LAB
HBA1C MFR BLD: 6.5 % (ref ?–5.8)
INT CON NEG: NEGATIVE
INT CON POS: POSITIVE

## 2017-08-28 PROCEDURE — 99214 OFFICE O/P EST MOD 30 MIN: CPT | Mod: GT | Performed by: INTERNAL MEDICINE

## 2017-08-28 ASSESSMENT — PATIENT HEALTH QUESTIONNAIRE - PHQ9: CLINICAL INTERPRETATION OF PHQ2 SCORE: 0

## 2017-08-28 NOTE — PATIENT INSTRUCTIONS
1800 Calorie Diet for Diabetes Meal Planning  The 1800 calorie diet is designed for eating up to 1800 calories each day. Following this diet and making healthy meal choices can help improve overall health. This diet controls blood sugar (glucose) levels and can also help lower blood pressure and cholesterol.  SERVING SIZES  Measuring foods and serving sizes helps to make sure you are getting the right amount of food. The list below tells how big or small some common serving sizes are:  · 1 oz.........4 stacked dice.   · 3 oz.........Deck of cards.   · 1 tsp........Tip of little finger.   · 1 tbs........Thumb.   · 2 tbs........Golf ball.   · ½ cup.......Half of a fist.   · 1 cup........A fist.   GUIDELINES FOR CHOOSING FOODS  The goal of this diet is to eat a variety of foods and limit calories to 1800 each day. This can be done by choosing foods that are low in calories and fat. The diet also suggests eating small amounts of food frequently. Doing this helps control your blood glucose levels so they do not get too high or too low. Each meal or snack may include a protein food source to help you feel more satisfied and to stabilize your blood glucose. Try to eat about the same amount of food around the same time each day. This includes weekend days, travel days, and days off work. Space your meals about 4 to 5 hours apart and add a snack between them if you wish.   For example, a daily food plan could include breakfast, a morning snack, lunch, dinner, and an evening snack. Healthy meals and snacks include whole grains, vegetables, fruits, lean meats, poultry, fish, and dairy products. As you plan your meals, select a variety of foods. Choose from the bread and starch, vegetable, fruit, dairy, and meat/protein groups. Examples of foods from each group and their suggested serving sizes are listed below. Use measuring cups and spoons to become familiar with what a healthy portion looks like.  Bread and Starch  Each  serving equals 15 grams of carbohydrates.  · 1 slice bread.   · ¼ bagel.   · ¾ cup cold cereal (unsweetened).   · ½ cup hot cereal or mashed potatoes.   · 1 small potato (size of a computer mouse).   ·  cup cooked pasta or rice.   · ½ English muffin.   · 1 cup broth-based soup.   · 3 cups of popcorn.   · 4 to 6 whole-wheat crackers.   · ½ cup cooked beans, peas, or corn.   Vegetable  Each serving equals 5 grams of carbohydrates.  · ½ cup cooked vegetables.   · 1 cup raw vegetables.   · ½ cup tomato or vegetable juice.   Fruit  Each serving equals 15 grams of carbohydrates.  · 1 small apple or orange.   · 1¼ cup watermelon or strawberries.   · ½ cup applesauce (no sugar added).   · 2 tbs raisins.   · ½ banana.   · ½ cup canned fruit, packed in water, its own juice, or sweetened with a sugar substitute.   · ½ cup unsweetened fruit juice.   Dairy  Each serving equals 12 to 15 grams of carbohydrates.  · 1 cup fat-free milk.   · 6 oz artificially sweetened yogurt or plain yogurt.   · 1 cup low-fat buttermilk.   · 1 cup soy milk.   · 1 cup almond milk.   Meat/Protein  · 1 large egg.   · 2 to 3 oz meat, poultry, or fish.   · ¼ cup low-fat cottage cheese.   · 1 tbs peanut butter.   · 1 oz low-fat cheese.   · ¼ cup tuna in water.   · ½ cup tofu.   Fat  · 1 tsp oil.   · 1 tsp trans-fat-free margarine.   · 1 tsp butter.   · 1 tsp mayonnaise.   · 2 tbs avocado.   · 1 tbs salad dressing.   · 1 tbs cream cheese.   · 2 tbs sour cream.   SAMPLE 1800 CALORIE DIET PLAN  Breakfast  · ¾ cup unsweetened cereal (1 carb serving).   · 1 cup fat-free milk (1 carb serving).   · 1 slice whole-wheat toast (1 carb serving).   · ½ small banana (1 carb serving).   · 1 scrambled egg.   · 1 tsp trans-fat-free margarine.   Lunch  · Tuna sandwich.   · 2 slices whole-wheat bread (2 carb servings).   · ½ cup canned tuna in water, drained.   · 1 tbs reduced fat mayonnaise.   · 1 stalk celery, chopped.   · 2 slices tomato.   · 1 lettuce leaf.   · 1 cup  carrot sticks.   · 24 to 30 seedless grapes (2 carb servings).   · 6 oz light yogurt (1 carb serving).   Afternoon Snack  · 3 carl cracker squares (1 carb serving).   · Fat-free milk, 1 cup (1 carb serving).   · 1 tbs peanut butter.   Dinner  · 3 oz salmon, broiled with 1 tsp oil.   · 1 cup mashed potatoes (2 carb servings) with 1 tsp trans-fat-free margarine.   · 1 cup fresh or frozen green beans.   · 1 cup steamed asparagus.   · 1 cup fat-free milk (1 carb serving).   Evening Snack  · 3 cups air-popped popcorn (1 carb serving).   · 2 tbs parmesan cheese sprinkled on top.   MEAL PLAN  Use this worksheet to help you make a daily meal plan based on the 1800 calorie diet suggestions. If you are using this plan to help you control your blood glucose, you may interchange carbohydrate-containing foods (dairy, starches, and fruits). Select a variety of fresh foods of varying colors and flavors. The total amount of carbohydrate in your meals or snacks is more important than making sure you include all of the food groups every time you eat. Choose from the following foods to build your day's meals:  · 8 Starches.   · 4 Vegetables.   · 3 Fruits.   · 2 Dairy.   · 6 to 7 oz Meat/Protein.   · Up to 4 Fats.   Your dietician can use this worksheet to help you decide how many servings and which types of foods are right for you.  BREAKFAST  Food Group and Servings / Food Choice  Starch ________________________________________________________  Dairy _________________________________________________________  Fruit _________________________________________________________  Meat/Protein __________________________________________________  Fat ___________________________________________________________  LUNCH  Food Group and Servings / Food Choice  Starch ________________________________________________________  Meat/Protein __________________________________________________  Vegetable  _____________________________________________________  Fruit _________________________________________________________  Dairy _________________________________________________________  Fat ___________________________________________________________  AFTERNOON SNACK  Food Group and Servings / Food Choice  Starch ________________________________________________________  Meat/Protein __________________________________________________  Fruit __________________________________________________________  Dairy _________________________________________________________  DINNER  Food Group and Servings / Food Choice  Starch _________________________________________________________  Meat/Protein ___________________________________________________  Dairy __________________________________________________________  Vegetable ______________________________________________________  Fruit ___________________________________________________________  Fat ____________________________________________________________  EVENING SNACK  Food Group and Servings / Food Choice  Fruit __________________________________________________________  Meat/Protein ___________________________________________________  Dairy __________________________________________________________  Starch _________________________________________________________  DAILY TOTALS  Starch ____________________________  Vegetable _________________________  Fruit _____________________________  Dairy _____________________________  Meat/Protein______________________  Fat _______________________________  Document Released: 07/10/2006 Document Revised: 03/11/2013 Document Reviewed: 11/02/2012  ExitCare® Patient Information ©2013 [facility].

## 2017-08-28 NOTE — PROGRESS NOTES
CC: Three-month follow-up visit    Verified identification with patient. Secured video conference with RN presenter in [facility]      HPI:   Gisel presents today with the following.    1. Type 2 diabetes mellitus without complication, without long-term current use of insulin (CMS-HCC)  Patient was last seen in May 2017. Hemoglobin A1c 6.1. Hemoglobin A1c today 6.5. Patient has gained 14 pounds since her last visit. Patient states that she has been camping and not following a diabetic diet. However she has discontinued sweets soda and juice. Patient states that she exercises 3 times a week with walking or bicycling. Patient does not take diabetic medications, which is to continue with diet management at this point.    2. Morbid obesity due to excess calories (CMS-HCC)  Patient has not been following her regular exercise and diet team due to summer vacationing. Patient has gained 14 pounds since last visit. BMI 39.28    3. Coronary artery disease, non-occlusive  Patient was seen by her cardiologist, Dr. Fernández In May. No new medication changes. Patient is stable. Patient denies cardiac symptoms including palpitations, chest pain, shortness of breath, dizziness or edema. Follow-up with cardiology per patient is within one year. Patient is currently on aspirin, discontinued Plavix. Takes Lipitor 10 mg daily. LDL at goal per cardiology.      Patient Active Problem List    Diagnosis Date Noted   • Type 2 diabetes mellitus without complication (CMS-HCC) 08/28/2017   • Palpitations 05/05/2017   • Vitamin D deficiency 02/27/2017   • Coronary artery disease, non-occlusive 12/16/2016   • Dyslipidemia 12/16/2016   • Hyperglycemia 10/31/2016   • Acute bronchitis due to infection 10/03/2016   • Hypothyroidism 06/06/2016   • SVT (supraventricular tachycardia) 10/10/2014   • Sleep disorder 06/20/2014   • PAF (paroxysmal atrial fibrillation) (CMS-HCC) 12/13/2013   • Obese 12/13/2013       Current Outpatient Prescriptions  "  Medication Sig Dispense Refill   • CINNAMON PO Take  by mouth.     • EPIPEN 2-YVROSE 0.3 MG/0.3ML Solution Auto-injector solution for injection      • levothyroxine (SYNTHROID) 112 MCG Tab Take 1 Tab by mouth every morning before breakfast. 30 Tab 11   • AFLURIA Suspension injection      • amoxicillin (AMOXIL) 875 MG tablet Take 1 Tab by mouth 2 times a day. 20 Tab 0   • albuterol 108 (90 BASE) MCG/ACT Aero Soln inhalation aerosol Inhale 2 Puffs by mouth every 6 hours as needed for Shortness of Breath. 8.5 g 1   • metoprolol (LOPRESSOR) 25 MG Tab Take 1 Tab by mouth 2 times a day. 180 Tab 3   • atorvastatin (LIPITOR) 10 MG Tab Take 1 Tab by mouth every evening. 90 Tab 3   • amoxicillin (AMOXIL) 875 MG tablet Take 1 Tab by mouth 2 times a day. 20 Tab 0   • vitamin D (CHOLECALCIFEROL) 1000 UNIT TABS Take 1,000 Units by mouth every day.     • aspirin (ASA) 81 MG CHEW Take 1 Tab by mouth every day. 100 Tab      No current facility-administered medications for this visit.          Allergies as of 08/28/2017 - Reviewed 08/28/2017   Allergen Reaction Noted   • Doxycycline  10/10/2013        ROS: As per HPI.Denies all other cardiopulmonary, GI, neurologic symptoms    /80   Pulse 71   Temp 36.6 °C (97.8 °F)   Resp 16   Ht 1.753 m (5' 9\")   Wt 120.7 kg (266 lb)   SpO2 97%   BMI 39.28 kg/m²     Physical Exam:  Gen:         Alert and oriented, No apparent distress.  Neck:        No Lymphadenopathy or Bruits.  Lungs:     Clear to auscultation bilaterally , no wheezes rhonchi or crackles  CV:          Regular rate and rhythm. No murmurs, rubs or gallops. S1-S2 heard  Abd:         Soft non tender, non distended. Normal active bowel sounds.  No  Hepatosplenomegaly, No pulsatile masses.                   Ext:          No clubbing, cyanosis, edema.      Assessment and Plan.   56 y.o. female with the following issues.    1. Type 2 diabetes mellitus without complication, without long-term current use of insulin " (CMS-HCC)  Patient declines oral diabetic medications  Patient information provided on diabetic diet, low-calorie diet, low carbohydrate. Food choices. Portion control  A1c in 4 months  Discussed the role of weight loss, increase aerobic activity to 4 times a week, 30-40 minutes of walking/cycling    2. Morbid obesity due to excess calories (CMS-HCC)  Weight loss goal of 10 pounds within 3-4 months to start.  Refer to #1    3. Coronary artery disease, non-occlusive  Stable. Follow-up with cardiology as scheduled    4. Elevated globulins. Release of information from Dr. Barton , hematologist            Please note that this dictation was created using voice recognition software. I have made every reasonable attempt to correct obvious errors, but expect that there are errors of grammar and possible content that I did not discover before finalizing note.

## 2017-08-30 DIAGNOSIS — E78.5 HYPERLIPIDEMIA, UNSPECIFIED HYPERLIPIDEMIA TYPE: ICD-10-CM

## 2017-08-30 DIAGNOSIS — I10 ESSENTIAL HYPERTENSION: ICD-10-CM

## 2017-09-01 RX ORDER — ATORVASTATIN CALCIUM 10 MG/1
10 TABLET, FILM COATED ORAL EVERY EVENING
Qty: 90 TAB | Refills: 3 | Status: SHIPPED | OUTPATIENT
Start: 2017-09-01 | End: 2018-09-05 | Stop reason: SDUPTHER

## 2018-01-08 ENCOUNTER — NON-PROVIDER VISIT (OUTPATIENT)
Dept: MEDICAL GROUP | Facility: CLINIC | Age: 58
End: 2018-01-08
Payer: COMMERCIAL

## 2018-01-08 ENCOUNTER — TELEMEDICINE ORIGINATING SITE VISIT (OUTPATIENT)
Dept: MEDICAL GROUP | Facility: CLINIC | Age: 58
End: 2018-01-08
Payer: COMMERCIAL

## 2018-01-08 ENCOUNTER — TELEMEDICINE2 (OUTPATIENT)
Dept: MEDICAL GROUP | Age: 58
End: 2018-01-08
Payer: COMMERCIAL

## 2018-01-08 VITALS
TEMPERATURE: 97.1 F | HEIGHT: 69 IN | OXYGEN SATURATION: 97 % | BODY MASS INDEX: 38.21 KG/M2 | DIASTOLIC BLOOD PRESSURE: 80 MMHG | HEART RATE: 69 BPM | RESPIRATION RATE: 16 BRPM | WEIGHT: 258 LBS | SYSTOLIC BLOOD PRESSURE: 123 MMHG

## 2018-01-08 DIAGNOSIS — E11.9 DIABETES MELLITUS WITHOUT COMPLICATION (HCC): ICD-10-CM

## 2018-01-08 DIAGNOSIS — E66.09 CLASS 2 OBESITY DUE TO EXCESS CALORIES WITH BODY MASS INDEX (BMI) OF 38.0 TO 38.9 IN ADULT, UNSPECIFIED WHETHER SERIOUS COMORBIDITY PRESENT: ICD-10-CM

## 2018-01-08 DIAGNOSIS — E03.9 HYPOTHYROIDISM, UNSPECIFIED TYPE: ICD-10-CM

## 2018-01-08 DIAGNOSIS — I25.10 CORONARY ARTERY DISEASE, NON-OCCLUSIVE: ICD-10-CM

## 2018-01-08 DIAGNOSIS — R73.9 HYPERGLYCEMIA: ICD-10-CM

## 2018-01-08 DIAGNOSIS — E11.9 TYPE 2 DIABETES MELLITUS WITHOUT COMPLICATION, WITHOUT LONG-TERM CURRENT USE OF INSULIN (HCC): ICD-10-CM

## 2018-01-08 LAB
HBA1C MFR BLD: 6.3 % (ref ?–5.8)
INT CON NEG: NEGATIVE
INT CON POS: POSITIVE

## 2018-01-08 PROCEDURE — 99214 OFFICE O/P EST MOD 30 MIN: CPT | Performed by: INTERNAL MEDICINE

## 2018-01-08 PROCEDURE — 83036 HEMOGLOBIN GLYCOSYLATED A1C: CPT | Performed by: INTERNAL MEDICINE

## 2018-01-08 RX ORDER — LEVOTHYROXINE SODIUM 112 UG/1
112 TABLET ORAL
Qty: 30 TAB | Refills: 11 | Status: SHIPPED | OUTPATIENT
Start: 2018-01-08 | End: 2019-01-08 | Stop reason: SDUPTHER

## 2018-01-08 ASSESSMENT — PATIENT HEALTH QUESTIONNAIRE - PHQ9: CLINICAL INTERPRETATION OF PHQ2 SCORE: 0

## 2018-01-08 NOTE — PATIENT INSTRUCTIONS
1500 Calorie Diabetic Diet  The 1500 calorie diabetic diet limits calories to 1500 each day. Following this diet and making healthy meal choices can help improve overall health. It controls blood glucose (sugar) levels and can also help lower blood pressure and cholesterol.   SERVING SIZES  Measuring foods and serving sizes helps to make sure you are getting the right amount of food. The list below tells how big or small some common serving sizes are.  · 1 oz.........4 stacked dice.   · 3 oz.........Deck of cards.   · 1 tsp........Tip of little finger.   · 1 tbs........Thumb.   · 2 tbs........Golf ball.   · ½ cup.......Half of a fist.   · 1 cup........A fist.   GUIDELINES FOR CHOOSING FOODS  The goal of this diet is to eat a variety of foods and limit calories to 1500 each day. This can be done by choosing foods that are low in calories and fat. The diet also suggests eating small amounts of food frequently. Doing this helps control your blood glucose levels, so they do not get too high or too low. Each meal or snack may include a protein food source to help you feel more satisfied. Try to eat about the same amount of food around the same time each day. This includes weekend days, travel days, and days off work. Space your meals about 4 to 5 hours apart, and add a snack between them, if you wish.   For example, a daily food plan could include breakfast, a morning snack, lunch, dinner, and an evening snack. Healthy meals and snacks have different types of foods, including whole grains, vegetables, fruits, lean meats, poultry, fish, and dairy products. As you plan your meals, select a variety of foods. Choose from the bread and starch, vegetable, fruit, dairy, and meat/protein groups. Examples of foods from each group are listed below, with their suggested serving sizes. Use measuring cups and spoons to become familiar with what a healthy portion looks like.  Bread and Starch  Each serving equals 15 grams of  carbohydrate.  · 1 slice bread.   · ¼ bagel.   · ¾ cup cold cereal (unsweetened).   · ½ cup hot cereal or mashed potatoes.   · 1 small potato (size of a computer mouse).   ·  cup cooked pasta or rice.   · ½ English muffin.   · 1 cup broth-based soup.   · 3 cups of popcorn.   · 4 to 6 whole-wheat crackers.   · ½ cup cooked beans, peas, or corn.   Vegetables  Each serving equals 5 grams of carbohydrate.  · ½ cup cooked vegetables.   · 1 cup raw vegetables.   · ½ cup tomato or vegetable juice.   Fruit  Each serving equals 15 grams of carbohydrate.  · 1 small apple or orange.   · 1 ¼ cup watermelon or strawberries.   · ½ cup applesauce (no sugar added).   · 2 tbs raisins.   · ½ banana.   · ½ cup canned fruit, packed in water or in its own juice.   · ½ cup unsweetened fruit juice.   Dairy  Each serving equals 12 to 15 grams of carbohydrate.  · 1 cup fat-free milk.   · 6 oz artificially sweetened yogurt or plain yogurt.   · 1 cup low-fat buttermilk.   · 1 cup soy milk.   · 1 cup almond milk.   Meat/Protein  · 1 large egg.   · 2 to 3 oz meat, poultry, or fish.   · ¼ cup low-fat cottage cheese.   · 1 tbs peanut butter.   · 1 oz low-fat cheese.   · ¼ cup tuna, packed in water.   · ½ cup tofu.   Fat  · 1 tsp oil.   · 1 tsp trans-fat-free margarine.   · 1 tsp butter.   · 1 tsp mayonnaise.   · 2 tbs avocado.   · 1 tbs salad dressing.   · 1 tbs cream cheese.   · 2 tbs sour cream.   SAMPLE 1500 CALORIE DIET PLAN  Breakfast  · ½ whole-wheat English muffin (1 carb serving).   · 1 tsp trans-fat-free margarine.   · 1 scrambled egg.   · 1 cup fat-free milk (1 carb serving).   · 1 small orange (1 carb serving).   Lunch  · Chicken wrap.   · 1 whole-wheat tortilla, 8-inch (1½ carb servings).   · 2 oz chicken breast, sliced.   · 2 tbs low-fat salad dressing, such as Italian.   · ¼ cup shredded lettuce.   · 2 slices tomato.   · ½ cup carrot sticks.   · 1 small apple (1 carb serving).   Afternoon Snack  · 3 carl cracker squares (1 carb  serving).   · 1 tbs peanut butter.   Dinner  · 2 oz lean pork chop, broiled.   · 1 cup brown rice (3 carb servings).   · ½ cup steamed carrots.   · ½ cup green beans.   · 1 cup fat-free milk (1 carb serving).   · 1 tsp trans-fat-free margarine.   Evening Snack  · ½ cup low-fat cottage cheese.   · 1 small peach or pear, sliced (or ½ cup canned in water) (1 carb serving).   MEAL PLAN  You can use this worksheet to help you make a daily meal plan based on the 1500 calorie diabetic diet suggestions. If you are using this plan to help you control your blood glucose, you may interchange carbohydrate containing foods (dairy, starches, and fruits). Select a variety of fresh foods of varying colors and flavors. The total amount of carbohydrate in your meals or snacks is more important than making sure you include all of the food groups every time you eat. You can choose from approximately this many of the following foods to build your day's meals:  · 6 Starches.   · 3 Vegetables.   · 2 Fruits.   · 2 Dairy.   · 4 to 6 oz Meat/Protein.   · Up to 3 Fats.   Your dietician can use this worksheet to help you decide how many servings and which types of foods are right for you.  BREAKFAST  Food Group and Servings / Food Choice  Starch _________________________________________________________  Dairy __________________________________________________________  Fruit ___________________________________________________________  Meat/Protein____________________________________________________  Fat ____________________________________________________________  LUNCH  Food Group and Servings / Food Choice   Starch _________________________________________________________  Meat/Protein ___________________________________________________  Vegetables _____________________________________________________  Fruit __________________________________________________________  Dairy __________________________________________________________  Fat  ____________________________________________________________  AFTERNOON SNACK  Food Group and Servings / Food Choice  Dairy __________________________________________________________  Starch _________________________________________________________  Meat/Protein____________________________________________________  Fruit ___________________________________________________________  DINNER  Food Group and Servings / Food Choice  Starch _________________________________________________________  Meat/Protein ___________________________________________________  Dairy __________________________________________________________  Vegetable ______________________________________________________  Fruit ___________________________________________________________  Fat ____________________________________________________________  EVENING SNACK  Food Group and Servings / Food Choice  Fruit ___________________________________________________________  Meat/Protein ____________________________________________________  Dairy __________________________________________________________  Starch __________________________________________________________  DAILY TOTALS  Starches _________________________  Vegetables _______________________  Fruits ____________________________  Dairy ____________________________  Meat/Protein_____________________  Fats _____________________________  Document Released: 07/10/2006 Document Revised: 03/11/2013 Document Reviewed: 11/04/2010  ExitCare® Patient Information ©2013 [facility].

## 2018-01-08 NOTE — PROGRESS NOTES
"CC: Six-month follow-up visit    Verified identification with patient. Secured video conference with RN presenter in [facility]      HPI:   Gisel presents today with the following.    1. Type 2 diabetes mellitus without complication, without long-term current use of insulin (CMS-HCC)  Patient with type II diabetes, diet-controlled. Hemoglobin A1c today 6.3, down from 6.5 in August 2017. Patient has lost 8 pounds since last visit however has not been following a strict diabetic diet and due to stressors in her life has not been watching her calorie intake.    2. Class 2 obesity due to excess calories with body mass index (BMI) of 38.0 to 38.9 in adult, unspecified whether serious comorbidity present  Patient has had increased stressors in her life, she is a \"stress eater\". She has not been exercising regularly, has been procrastinating. Feels that she is not needing the right types of food or the right.    3. Coronary artery disease, non-occlusive  Patient will have follow-up with her cardiologist in May 2018. Patient continues to take Lopressor and Lipitor. Lipid panel within goal range in April 2017. Patient will need lab work prior to her next cardiology visit. Patient denies chest pain, shortness of breath, fatigue, palpitations, edema or dizziness.    4. Hypothyroidism, unspecified type  Requesting refill on Synthroid 112 MCG daily      Patient Active Problem List    Diagnosis Date Noted   • Type 2 diabetes mellitus without complication (CMS-HCC) 08/28/2017   • Palpitations 05/05/2017   • Vitamin D deficiency 02/27/2017   • Coronary artery disease, non-occlusive 12/16/2016   • Dyslipidemia 12/16/2016   • Hyperglycemia 10/31/2016   • Acute bronchitis due to infection 10/03/2016   • Hypothyroidism 06/06/2016   • SVT (supraventricular tachycardia) (CMS-HCC) 10/10/2014   • Sleep disorder 06/20/2014   • PAF (paroxysmal atrial fibrillation) (CMS-HCC) 12/13/2013   • Obese 12/13/2013       Current Outpatient " "Prescriptions   Medication Sig Dispense Refill   • levothyroxine (SYNTHROID) 112 MCG Tab Take 1 Tab by mouth every morning before breakfast. 30 Tab 11   • metoprolol (LOPRESSOR) 25 MG Tab Take 1 Tab by mouth 2 times a day. 180 Tab 3   • atorvastatin (LIPITOR) 10 MG Tab Take 1 Tab by mouth every evening. 90 Tab 3   • CINNAMON PO Take  by mouth.     • EPIPEN 2-YVROSE 0.3 MG/0.3ML Solution Auto-injector solution for injection      • AFLURIA Suspension injection      • amoxicillin (AMOXIL) 875 MG tablet Take 1 Tab by mouth 2 times a day. 20 Tab 0   • albuterol 108 (90 BASE) MCG/ACT Aero Soln inhalation aerosol Inhale 2 Puffs by mouth every 6 hours as needed for Shortness of Breath. 8.5 g 1   • amoxicillin (AMOXIL) 875 MG tablet Take 1 Tab by mouth 2 times a day. 20 Tab 0   • vitamin D (CHOLECALCIFEROL) 1000 UNIT TABS Take 1,000 Units by mouth every day.     • aspirin (ASA) 81 MG CHEW Take 1 Tab by mouth every day. 100 Tab      No current facility-administered medications for this visit.          Allergies as of 01/08/2018 - Reviewed 01/08/2018   Allergen Reaction Noted   • Doxycycline  10/10/2013        ROS: As per HPI. Denies cardiopulmonary, GI, neurologic symptoms.    /80   Pulse 69   Temp 36.2 °C (97.1 °F)   Resp 16   Ht 1.753 m (5' 9\")   Wt 117 kg (258 lb)   SpO2 97%   BMI 38.10 kg/m²     Physical Exam:  Gen:         Alert and oriented, No apparent distress.  Neck:        No Lymphadenopathy or Bruits.  Lungs:     Clear to auscultation bilaterally, no wheezes rhonchi or crackles  CV:          Regular rate and rhythm. No murmurs, rubs or gallops.  Abd:         Soft non tender, non distended. Normal active bowel sounds.  No  Hepatosplenomegaly, No pulsatile masses.                   Ext:          No clubbing, cyanosis, edema.      Assessment and Plan.   57 y.o. female with the following issues.    1. Type 2 diabetes mellitus without complication, without long-term current use of insulin (CMS-[facility])  Diabetic " diet information provided  Recommend ADA as a resource on the Internet  A1c 6.3  Counseled patient on diabetic and lifestyle management of diabetes    2. Class 2 obesity due to excess calories with body mass index (BMI) of 38.0 to 38.9 in adult, unspecified whether serious comorbidity present  Counseled patient on diet and lifestyle modification  Portion/calorie control  Diabetic diet  Role of physical exercise, resources discussed  RTC 3 months    3. Coronary artery disease, non-occlusive  Patient stable   Follow-up cardiology in May    4. Hypothyroidism, unspecified type  Asymptomatic  Labs do one next visit    - levothyroxine (SYNTHROID) 112 MCG Tab; Take 1 Tab by mouth every morning before breakfast.  Dispense: 30 Tab; Refill: 11            Please note that this dictation was created using voice recognition software. I have made every reasonable attempt to correct obvious errors, but expect that there are errors of grammar and possible content that I did not discover before finalizing note.

## 2018-04-09 ENCOUNTER — TELEMEDICINE2 (OUTPATIENT)
Dept: MEDICAL GROUP | Age: 58
End: 2018-04-09
Payer: COMMERCIAL

## 2018-04-09 ENCOUNTER — TELEMEDICINE ORIGINATING SITE VISIT (OUTPATIENT)
Dept: MEDICAL GROUP | Facility: CLINIC | Age: 58
End: 2018-04-09
Payer: COMMERCIAL

## 2018-04-09 VITALS
BODY MASS INDEX: 38.36 KG/M2 | SYSTOLIC BLOOD PRESSURE: 130 MMHG | OXYGEN SATURATION: 95 % | HEIGHT: 69 IN | HEART RATE: 65 BPM | RESPIRATION RATE: 16 BRPM | DIASTOLIC BLOOD PRESSURE: 83 MMHG | WEIGHT: 259 LBS | TEMPERATURE: 98.1 F

## 2018-04-09 DIAGNOSIS — E66.09 OBESITY DUE TO EXCESS CALORIES WITH SERIOUS COMORBIDITY, UNSPECIFIED CLASSIFICATION: ICD-10-CM

## 2018-04-09 DIAGNOSIS — E78.5 DYSLIPIDEMIA: ICD-10-CM

## 2018-04-09 DIAGNOSIS — E02 SUBCLINICAL IODINE-DEFICIENCY HYPOTHYROIDISM: ICD-10-CM

## 2018-04-09 DIAGNOSIS — E55.9 VITAMIN D DEFICIENCY: ICD-10-CM

## 2018-04-09 DIAGNOSIS — E11.9 TYPE 2 DIABETES MELLITUS WITHOUT COMPLICATION, WITHOUT LONG-TERM CURRENT USE OF INSULIN (HCC): ICD-10-CM

## 2018-04-09 PROCEDURE — 99213 OFFICE O/P EST LOW 20 MIN: CPT | Performed by: INTERNAL MEDICINE

## 2018-04-09 PROCEDURE — 92250 FUNDUS PHOTOGRAPHY W/I&R: CPT | Mod: TC | Performed by: INTERNAL MEDICINE

## 2018-04-09 NOTE — PROGRESS NOTES
CC: Three-month follow-up visit    Verified identification with patient. Secured video conference with RN presenter in [facility]      HPI:   Gisel presents today with the following.    1. Obesity due to excess calories with serious comorbidity, unspecified classification  Patient's current weight is 259 pounds, BMI of 38.5. Patient has not lost any weight since last visit. Patient struggles with portion control and availability of food choices. Patient works as an  and eats only twice a day often limited to fast food/restaurant food. At home, she is trying to eat more fruits and vegetables, eating more fish and chicken. She keeps active at work however does not have an exercise routine that she follows.     2. Type 2 diabetes mellitus without complication, without long-term current use of insulin (CMS-HCC)  Patient's most recent hemoglobin A1c was 6.3 in January. Patient has not been able to follow a good diabetic diet since last visit and has not lost any weight. Has not had a diabetic retinal exam.    3. Subclinical iodine-deficiency hypothyroidism  Patient currently taking Synthroid 112 MCG daily. Asymptomatic. Patient is due for follow-up thyroid function panel          Patient Active Problem List    Diagnosis Date Noted   • Type 2 diabetes mellitus without complication (CMS-HCC) 08/28/2017   • Palpitations 05/05/2017   • Vitamin D deficiency 02/27/2017   • Coronary artery disease, non-occlusive 12/16/2016   • Dyslipidemia 12/16/2016   • Hyperglycemia 10/31/2016   • Acute bronchitis due to infection 10/03/2016   • Hypothyroidism 06/06/2016   • SVT (supraventricular tachycardia) (CMS-HCC) 10/10/2014   • Sleep disorder 06/20/2014   • PAF (paroxysmal atrial fibrillation) (CMS-HCC) 12/13/2013   • Obese 12/13/2013       Current Outpatient Prescriptions   Medication Sig Dispense Refill   • levothyroxine (SYNTHROID) 112 MCG Tab Take 1 Tab by mouth every morning before breakfast. 30 Tab 11   •  "metoprolol (LOPRESSOR) 25 MG Tab Take 1 Tab by mouth 2 times a day. 180 Tab 3   • atorvastatin (LIPITOR) 10 MG Tab Take 1 Tab by mouth every evening. 90 Tab 3   • CINNAMON PO Take  by mouth.     • EPIPEN 2-YVROSE 0.3 MG/0.3ML Solution Auto-injector solution for injection      • AFLURIA Suspension injection      • amoxicillin (AMOXIL) 875 MG tablet Take 1 Tab by mouth 2 times a day. 20 Tab 0   • albuterol 108 (90 BASE) MCG/ACT Aero Soln inhalation aerosol Inhale 2 Puffs by mouth every 6 hours as needed for Shortness of Breath. 8.5 g 1   • amoxicillin (AMOXIL) 875 MG tablet Take 1 Tab by mouth 2 times a day. 20 Tab 0   • vitamin D (CHOLECALCIFEROL) 1000 UNIT TABS Take 1,000 Units by mouth every day.     • aspirin (ASA) 81 MG CHEW Take 1 Tab by mouth every day. 100 Tab      No current facility-administered medications for this visit.          Allergies as of 04/09/2018 - Reviewed 04/09/2018   Allergen Reaction Noted   • Doxycycline  10/10/2013        ROS: As per HPI. Denies any cardiopulmonary, GI, neurologic symptoms.    /83   Pulse 65   Temp 36.7 °C (98.1 °F)   Resp 16   Ht 1.753 m (5' 9\")   Wt 117.5 kg (259 lb)   SpO2 95%   BMI 38.25 kg/m²     Physical Exam:  Gen:         Alert and oriented, No apparent distress.    Assessment and Plan.   57 y.o. female with the following issues.    1. Obesity due to excess calories with serious comorbidity, unspecified classification  Patient's goal weight is 175 pounds.  Counseled patient on activities available to her such as local gym, walking 30-40 minutes a day, pedometer to reach 10,000 steps daily.  Discussed portion control, food choices, food preparation.    2. Type 2 diabetes mellitus without complication, without long-term current use of insulin (CMS-HCC)  Labs ordered  Microfilament test upon return    - POCT Retinal Eye Exam  - COMP METABOLIC PANEL; Future  - CBC WITH DIFFERENTIAL; Future  - HGB A1C WITH EAG ESTIMATION  - MICROALB/CREAT RATIO, TIMED UR    3. " Subclinical iodine-deficiency hypothyroidism  Continue current dose of Synthroid    - THYROID PANEL WITH TSH    4. Dyslipidemia    - LIPID PROFILE; Future    5. Vitamin D deficiency    - VITAMIN D,25 HYDROXY; Future    Total visit time, 15 minutes, time spent in diet and lifestyle modification counseling, coordination of medical care.        Please note that this dictation was created using voice recognition software. I have made every reasonable attempt to correct obvious errors, but expect that there are errors of grammar and possible content that I did not discover before finalizing note.

## 2018-04-19 ENCOUNTER — NON-PROVIDER VISIT (OUTPATIENT)
Dept: MEDICAL GROUP | Facility: CLINIC | Age: 58
End: 2018-04-19
Payer: COMMERCIAL

## 2018-04-19 DIAGNOSIS — E78.5 DYSLIPIDEMIA: ICD-10-CM

## 2018-04-19 PROCEDURE — 36415 COLL VENOUS BLD VENIPUNCTURE: CPT | Performed by: INTERNAL MEDICINE

## 2018-04-19 NOTE — NON-PROVIDER
Gisel Montez is a 57 y.o. female here for a non-provider visit for venipuncture/retina scan.    If abnormal was an in office provider notified today (if so, indicate provider)? Yes  Routed to PCP? Yes

## 2018-04-20 LAB
25(OH)D3+25(OH)D2 SERPL-MCNC: 54.4 NG/ML (ref 30–100)
ALBUMIN SERPL-MCNC: 4.1 G/DL (ref 3.5–5.5)
ALBUMIN/GLOB SERPL: 1 {RATIO} (ref 1.2–2.2)
ALP SERPL-CCNC: 106 IU/L (ref 39–117)
ALT SERPL-CCNC: 18 IU/L (ref 0–32)
AST SERPL-CCNC: 21 IU/L (ref 0–40)
BASOPHILS # BLD AUTO: 0 X10E3/UL (ref 0–0.2)
BASOPHILS NFR BLD AUTO: 0 %
BILIRUB SERPL-MCNC: 0.4 MG/DL (ref 0–1.2)
BUN SERPL-MCNC: 17 MG/DL (ref 6–24)
BUN/CREAT SERPL: 23 (ref 9–23)
CALCIUM SERPL-MCNC: 9.5 MG/DL (ref 8.7–10.2)
CHLORIDE SERPL-SCNC: 103 MMOL/L (ref 96–106)
CHOLEST SERPL-MCNC: 106 MG/DL (ref 100–199)
CHOLEST/HDLC SERPL: 3.3 RATIO (ref 0–4.4)
CO2 SERPL-SCNC: 22 MMOL/L (ref 18–29)
CREAT SERPL-MCNC: 0.75 MG/DL (ref 0.57–1)
CREAT UR-MCNC: 102.2 MG/DL
EOSINOPHIL # BLD AUTO: 0.1 X10E3/UL (ref 0–0.4)
EOSINOPHIL NFR BLD AUTO: 1 %
ERYTHROCYTE [DISTWIDTH] IN BLOOD BY AUTOMATED COUNT: 13.9 % (ref 12.3–15.4)
FT4I SERPL CALC-MCNC: 2.5 (ref 1.2–4.9)
GFR SERPLBLD CREATININE-BSD FMLA CKD-EPI: 103 ML/MIN/1.73
GFR SERPLBLD CREATININE-BSD FMLA CKD-EPI: 89 ML/MIN/1.73
GLOBULIN SER CALC-MCNC: 4.1 G/DL (ref 1.5–4.5)
GLUCOSE SERPL-MCNC: 140 MG/DL (ref 65–99)
HBA1C MFR BLD: 6.9 % (ref 4.8–5.6)
HCT VFR BLD AUTO: 45.4 % (ref 34–46.6)
HDLC SERPL-MCNC: 32 MG/DL
HGB BLD-MCNC: 14.8 G/DL (ref 11.1–15.9)
IMM GRANULOCYTES # BLD: 0 X10E3/UL (ref 0–0.1)
IMM GRANULOCYTES NFR BLD: 0 %
IMMATURE CELLS  115398: NORMAL
LABORATORY COMMENT REPORT: ABNORMAL
LDLC SERPL CALC-MCNC: 47 MG/DL (ref 0–99)
LYMPHOCYTES # BLD AUTO: 3 X10E3/UL (ref 0.7–3.1)
LYMPHOCYTES NFR BLD AUTO: 42 %
MCH RBC QN AUTO: 29.6 PG (ref 26.6–33)
MCHC RBC AUTO-ENTMCNC: 32.6 G/DL (ref 31.5–35.7)
MCV RBC AUTO: 91 FL (ref 79–97)
MONOCYTES # BLD AUTO: 0.5 X10E3/UL (ref 0.1–0.9)
MONOCYTES NFR BLD AUTO: 7 %
MORPHOLOGY BLD-IMP: NORMAL
NEUTROPHILS # BLD AUTO: 3.5 X10E3/UL (ref 1.4–7)
NEUTROPHILS NFR BLD AUTO: 50 %
NRBC BLD AUTO-RTO: NORMAL %
PLATELET # BLD AUTO: 222 X10E3/UL (ref 150–379)
POTASSIUM SERPL-SCNC: 4.2 MMOL/L (ref 3.5–5.2)
PROT SERPL-MCNC: 8.2 G/DL (ref 6–8.5)
PROT UR-MCNC: 8.1 MG/DL
PROT/CREAT UR: 79 MG/G CREAT (ref 0–200)
RBC # BLD AUTO: 5 X10E6/UL (ref 3.77–5.28)
SODIUM SERPL-SCNC: 140 MMOL/L (ref 134–144)
T3RU NFR SERPL: 27 % (ref 24–39)
T4 SERPL-MCNC: 9.3 UG/DL (ref 4.5–12)
TRIGL SERPL-MCNC: 136 MG/DL (ref 0–149)
TSH SERPL DL<=0.005 MIU/L-ACNC: 3.11 UIU/ML (ref 0.45–4.5)
VLDLC SERPL CALC-MCNC: 27 MG/DL (ref 5–40)
WBC # BLD AUTO: 7.2 X10E3/UL (ref 3.4–10.8)

## 2018-04-25 LAB — RETINAL SCREEN: NEGATIVE

## 2018-05-04 ENCOUNTER — OFFICE VISIT (OUTPATIENT)
Dept: CARDIOLOGY | Facility: MEDICAL CENTER | Age: 58
End: 2018-05-04
Payer: COMMERCIAL

## 2018-05-04 VITALS
RESPIRATION RATE: 14 BRPM | HEIGHT: 69 IN | WEIGHT: 260 LBS | DIASTOLIC BLOOD PRESSURE: 80 MMHG | SYSTOLIC BLOOD PRESSURE: 118 MMHG | OXYGEN SATURATION: 94 % | HEART RATE: 70 BPM | BODY MASS INDEX: 38.51 KG/M2

## 2018-05-04 DIAGNOSIS — I48.0 PAF (PAROXYSMAL ATRIAL FIBRILLATION) (HCC): ICD-10-CM

## 2018-05-04 DIAGNOSIS — I25.10 CORONARY ARTERY DISEASE, NON-OCCLUSIVE: ICD-10-CM

## 2018-05-04 DIAGNOSIS — R00.2 PALPITATIONS: ICD-10-CM

## 2018-05-04 DIAGNOSIS — E78.5 DYSLIPIDEMIA: ICD-10-CM

## 2018-05-04 DIAGNOSIS — I47.10 SVT (SUPRAVENTRICULAR TACHYCARDIA): ICD-10-CM

## 2018-05-04 PROCEDURE — 99214 OFFICE O/P EST MOD 30 MIN: CPT | Performed by: INTERNAL MEDICINE

## 2018-05-04 ASSESSMENT — ENCOUNTER SYMPTOMS
MYALGIAS: 0
SHORTNESS OF BREATH: 0
COUGH: 0
PALPITATIONS: 0
DIZZINESS: 0
LOSS OF CONSCIOUSNESS: 0

## 2018-05-04 NOTE — PROGRESS NOTES
Chief Complaint   Patient presents with   • Coronary Artery Disease       Subjective:   Gisel Montez is a 57 y.o. female who presents today for follow up evaluation for CAD, previous inferior myocardial infarction, PAF, SVT, palpitations and hyperlipidemia.     Last seen on 5/5/2017.    Since 5/5/2017 the patient has had no cardiac symptoms.  No angina pectoris or palpitations.  Recent lab shows increasing blood sugar and glycohemoglobin.  Lipid panel stable.     Since 12/16/2016 the patient has had no cardiac symptoms. No angina pectoris or palpitations.  Recent lipid panel was normal.     Since 7/21/2016 appointment the patient's had no cardiac symptoms.  Tolerating and compliant with medications.  Follow-up with PCP Dr. Arambula who placed the patient on a diet.  Patient has lost 10 pounds.     Has new PCP with [facility] Dr Lou Arambula     Since last appointment in 7/2015 the patient stopped cigarette 7 months ago.  She didn't along with a friend.  She is also altered her dietary habits.  No new cardiac symptoms.  Still doesn't have a PCP in [place], [place]     Past medical history  Saw Dr.Steve Barton, hematologist for elevated serum globulins.  Being monitored.  Stopped Plavix in May 2015.     On 10/21/2014 seen at [facility].  Chest pain. Normal troponin. Fixed defect on MPI.  Discharged on medications.     On 10/14/2013 discharged from [facility] after an STEMI inferior MI.  Received TNK in [place], [place] and transferred to [facility].  Cardiac catheterization on 10/10/2013 showed mild CAD and inferior wall hypokinesis. EF was 65%..  Subsequently developed atrial fibrillation requiring electrical cardioversion on 10/13/2013.     Past Medical History:   Diagnosis Date   • ACTIVE SMOKER      4-10 sticks/40   • Acute bronchitis due to infection 10/3/2016   • Acute MI, inferior wall (HCC)     • CAD (coronary artery disease)     • Cardiomyopathy (HCC)    • Hyperglycemia 10/31/2016   • Hypothyroidism     • PAF  "(paroxysmal atrial fibrillation) ([facility])     • Palpitations     • Type 2 diabetes mellitus without complication ([facility]) 8/28/2017   • Vitamin D deficiency 2/27/2017     Past Surgical History:   Procedure Laterality Date   • GYN SURGERY      tubal ligation   • OTHER      choleycystectomy     Family History   Problem Relation Age of Onset   • Diabetes Father 68     Social History     Social History   • Marital status:      Spouse name: N/A   • Number of children: N/A   • Years of education: N/A     Occupational History   • other      [employer]     Social History Main Topics   • Smoking status: Former Smoker     Packs/day: 0.50     Years: 35.00     Types: Cigarettes     Quit date: 5/29/2015   • Smokeless tobacco: Never Used   • Alcohol use 3.6 oz/week     2 Glasses of wine, 4 Cans of beer per week      Comment: one/week   • Drug use: No   • Sexual activity: Yes     Partners: Male     Other Topics Concern   • Not on file     Social History Narrative    . Lives in [place], [place]. Works for [employer].     Allergies   Allergen Reactions   • Other Food      \"CELERY\". Became violently ill, tongue swelled and hard to breath.   • Doxycycline      Outpatient Encounter Prescriptions as of 5/4/2018   Medication Sig Dispense Refill   • levothyroxine (SYNTHROID) 112 MCG Tab Take 1 Tab by mouth every morning before breakfast. 30 Tab 11   • metoprolol (LOPRESSOR) 25 MG Tab Take 1 Tab by mouth 2 times a day. 180 Tab 3   • atorvastatin (LIPITOR) 10 MG Tab Take 1 Tab by mouth every evening. 90 Tab 3   • CINNAMON PO Take 2 Capsule by mouth every day.     • EPIPEN 2-YVROSE 0.3 MG/0.3ML Solution Auto-injector solution for injection      • albuterol 108 (90 BASE) MCG/ACT Aero Soln inhalation aerosol Inhale 2 Puffs by mouth every 6 hours as needed for Shortness of Breath. 8.5 g 1   • vitamin D (CHOLECALCIFEROL) 1000 UNIT TABS Take 2,000 Units by mouth every day.     • aspirin (ASA) 81 MG CHEW Take 1 Tab by mouth every day. 100 Tab  " "  • AFLURIA Suspension injection      • amoxicillin (AMOXIL) 875 MG tablet Take 1 Tab by mouth 2 times a day. (Patient not taking: Reported on 5/4/2018) 20 Tab 0   • amoxicillin (AMOXIL) 875 MG tablet Take 1 Tab by mouth 2 times a day. (Patient not taking: Reported on 5/4/2018) 20 Tab 0     No facility-administered encounter medications on file as of 5/4/2018.      Review of Systems   Respiratory: Negative for cough and shortness of breath.    Cardiovascular: Negative for chest pain and palpitations.   Musculoskeletal: Negative for myalgias.   Neurological: Negative for dizziness and loss of consciousness.        Objective:   /80   Pulse 70   Resp 14   Ht 1.753 m (5' 9\")   Wt 117.9 kg (260 lb)   SpO2 94%   BMI 38.40 kg/m²     Physical Exam   Constitutional: She is oriented to person, place, and time. She appears well-developed and well-nourished. No distress.   Neck: No JVD present.   Cardiovascular: Normal rate, regular rhythm, normal heart sounds and intact distal pulses.  Exam reveals no gallop and no friction rub.    No murmur heard.  Pulmonary/Chest: Effort normal and breath sounds normal. No respiratory distress. She has no wheezes. She has no rales.   Musculoskeletal: She exhibits no edema.   Neurological: She is alert and oriented to person, place, and time.   Skin: Skin is warm and dry.   Psychiatric: She has a normal mood and affect. Her behavior is normal.     DATE OF PROCEDURE:  10/10/2013  PROCEDURES:  1.  Left heart catheterization.  2.  Coronary angiography.  3.  Left ventriculogram.   PREPROCEDURE DIAGNOSIS:    1. Possible atypical Takotsubo syndrome.  2. Non obstructive coronary artery disease.  POSTPROCEDURE DIAGNOSIS:  Possible atypical Takotsubo syndrome.    10/12/2013 Echocardiogram  Left ventricular ejection fraction is 55% to 60%.  Mild concentric left ventricular hypertrophy.  Mildly dilated left atrium.  Aortic sclerosis without stenosis.  Right ventricular systolic pressure is " estimated to be 25 mmhg.    10/10/2013 Chest CTA  1. No pulmonary embolism  2. Mild atelectasis  3. Previous cholecystectomy  4. Atherosclerosis     2014 Cardiac event recorder: [employer]. 4 beasts SVT. 4 beats VT.     Assessment:     1. Coronary artery disease, non-occlusive     2. PAF (paroxysmal atrial fibrillation) ([facility])     3. Dyslipidemia     4. Palpitations     5. SVT (supraventricular tachycardia) ([facility])         Medical Decision Making:  Today's Assessment / Status / Plan:   1. MI. 10/10/2013. Unclear mechanism. ? Cardiac embolic due to PAF.       2. PAF. Continue current therapy. On aspirin. Now off plavix.     3. Palpitations. Resolved.      4. Hyperlipidemia. LDL at goal.       5. SVT. No clinical recurrence.     6. VT. Continue beta blocker therapy.     7. Sleep disorder.       8. Elevated LFTs in 10/2014 now resolved.     9. Elevated globulin. Per Dr Barton.       10. Obesity.     11. History of tobacco use.    12. Glucose intolerance.     Recommendations  1. Medically the patient is stable from a cardiac standpoint.  2. Continue current medical therapy.  3. Follow-up 12 months.

## 2018-06-11 ENCOUNTER — TELEMEDICINE ORIGINATING SITE VISIT (OUTPATIENT)
Dept: MEDICAL GROUP | Facility: CLINIC | Age: 58
End: 2018-06-11
Payer: COMMERCIAL

## 2018-06-11 ENCOUNTER — TELEMEDICINE2 (OUTPATIENT)
Dept: MEDICAL GROUP | Age: 58
End: 2018-06-11
Payer: COMMERCIAL

## 2018-06-11 VITALS
HEIGHT: 69 IN | DIASTOLIC BLOOD PRESSURE: 78 MMHG | HEART RATE: 65 BPM | RESPIRATION RATE: 16 BRPM | WEIGHT: 252 LBS | BODY MASS INDEX: 37.33 KG/M2 | OXYGEN SATURATION: 93 % | TEMPERATURE: 97.2 F | SYSTOLIC BLOOD PRESSURE: 119 MMHG

## 2018-06-11 DIAGNOSIS — E78.5 DYSLIPIDEMIA: ICD-10-CM

## 2018-06-11 DIAGNOSIS — I25.10 CORONARY ARTERY DISEASE, NON-OCCLUSIVE: ICD-10-CM

## 2018-06-11 DIAGNOSIS — E66.01 CLASS 2 SEVERE OBESITY DUE TO EXCESS CALORIES WITH SERIOUS COMORBIDITY AND BODY MASS INDEX (BMI) OF 35.0 TO 35.9 IN ADULT (HCC): ICD-10-CM

## 2018-06-11 DIAGNOSIS — E03.8 OTHER SPECIFIED HYPOTHYROIDISM: ICD-10-CM

## 2018-06-11 DIAGNOSIS — E11.9 TYPE 2 DIABETES MELLITUS WITHOUT COMPLICATION, WITHOUT LONG-TERM CURRENT USE OF INSULIN (HCC): ICD-10-CM

## 2018-06-11 PROCEDURE — 99214 OFFICE O/P EST MOD 30 MIN: CPT | Performed by: INTERNAL MEDICINE

## 2018-06-11 NOTE — PROGRESS NOTES
CC: Follow-up lab work    Verified identification with patient. Secured video conference with RN presenter in [facility]      HPI:   Gisel presents today with the following.    1. Type 2 diabetes mellitus without complication, without long-term current use of insulin ([facility])  Hemoglobin A1c 6.9, up from 6.3. Urine protein negative. Patient has lost 8 pounds since her last visit. She is change the way she appears her food, no fried food, soft pain and beaking. Decreased her portion sizes and now eating 3 meals and 2 snacks a day. Increase her vegetable intake. Randomly checks blood sugar in the morning, averaging around 110 to 130.  Patient not on diabetic medications    2. Class 2 severe obesity due to excess calories with serious comorbidity and body mass index (BMI) of 35.0 to 35.9 in adult ([facility])  Patient has lost 8 pounds since last visit. Current weight at 252, BMI 37.21. Goal weight now is to reach 230 pounds.    3. Other specified hypothyroidism  TSH 3.11.  Taking Synthroid 112 MCG [place]    4. Coronary artery disease, non-occlusive  Patient was seen by her cardiologist for follow-up appointment. Patient is stable on current medications. Taking aspirin daily. Off of Plavix. Follow-up in one year. Patient asymptomatic.  Total cholesterol 106, triglyceride 136, HDL 32, LDL 47. Patient taking Lipitor 10 mg daily. Blood pressure well controlled.      Patient Active Problem List    Diagnosis Date Noted   • Type 2 diabetes mellitus without complication ([facility]) 08/28/2017   • Palpitations 05/05/2017   • Vitamin D deficiency 02/27/2017   • Coronary artery disease, non-occlusive 12/16/2016   • Dyslipidemia 12/16/2016   • Hyperglycemia 10/31/2016   • Acute bronchitis due to infection 10/03/2016   • Hypothyroidism 06/06/2016   • SVT (supraventricular tachycardia) ([facility]) 10/10/2014   • Sleep disorder 06/20/2014   • PAF (paroxysmal atrial fibrillation) ([facility]) 12/13/2013   • Obese 12/13/2013       Current Outpatient  "Prescriptions   Medication Sig Dispense Refill   • metFORMIN (GLUCOPHAGE) 500 MG Tab Take 1 Tab by mouth every day. 30 Tab 3   • levothyroxine (SYNTHROID) 112 MCG Tab Take 1 Tab by mouth every morning before breakfast. 30 Tab 11   • metoprolol (LOPRESSOR) 25 MG Tab Take 1 Tab by mouth 2 times a day. 180 Tab 3   • atorvastatin (LIPITOR) 10 MG Tab Take 1 Tab by mouth every evening. 90 Tab 3   • CINNAMON PO Take 2 Capsule by mouth every day.     • EPIPEN 2-YVROSE 0.3 MG/0.3ML Solution Auto-injector solution for injection      • AFLURIA Suspension injection      • amoxicillin (AMOXIL) 875 MG tablet Take 1 Tab by mouth 2 times a day. (Patient not taking: Reported on 5/4/2018) 20 Tab 0   • albuterol 108 (90 BASE) MCG/ACT Aero Soln inhalation aerosol Inhale 2 Puffs by mouth every 6 hours as needed for Shortness of Breath. 8.5 g 1   • amoxicillin (AMOXIL) 875 MG tablet Take 1 Tab by mouth 2 times a day. (Patient not taking: Reported on 5/4/2018) 20 Tab 0   • vitamin D (CHOLECALCIFEROL) 1000 UNIT TABS Take 2,000 Units by mouth every day.     • aspirin (ASA) 81 MG CHEW Take 1 Tab by mouth every day. 100 Tab      No current facility-administered medications for this visit.          Allergies as of 06/11/2018 - Reviewed 06/11/2018   Allergen Reaction Noted   • Other food  05/04/2018   • Doxycycline  10/10/2013        ROS: As per HPI. Denies cardiopulmonary, GI, neurologic symptoms.    /78   Pulse 65   Temp 36.2 °C (97.2 °F)   Resp 16   Ht 1.753 m (5' 9\")   Wt 114.3 kg (252 lb)   SpO2 93%   BMI 37.21 kg/m²     Physical Exam:  Gen:         Alert and oriented, No apparent distress.  Neck:        No Lymphadenopathy or Bruits. Neck is supple  Lungs:     Clear to auscultation bilaterally, no wheezes rhonchi or crackles  CV:          Regular rate and rhythm. No murmurs, rubs or gallops.  Abd:         Soft non tender, non distended. Normal active bowel sounds.  No  Hepatosplenomegaly, No pulsatile masses.                 "   Ext:          No clubbing, cyanosis, edema.      Assessment and Plan.   57 y.o. female with the following issues.    1. Type 2 diabetes mellitus without complication, without long-term current use of insulin ([facility])  Start metformin 500 mg daily  Counseled patient on diabetic diet, food sources, food preparation  Incorporate exercise 30-40 minutes daily  Recheck hemoglobin A1c in 3 months    - metFORMIN (GLUCOPHAGE) 500 MG Tab; Take 1 Tab by mouth every day.  Dispense: 30 Tab; Refill: 3    2. Class 2 severe obesity due to excess calories with serious comorbidity and body mass index (BMI) of 35.0 to 35.9 in adult ([facility])  Continue current diet plan as discussed above, focus on portion control  Counseled patient on options of physical activity for 30-40 minutes daily.    3. Other specified hypothyroidism  Continue Synthroid at current dose    4. Coronary artery disease, non-occlusive  Cardiology office visit notes reviewed  Patient stable, continue current plan of care      Total visit time 25 minutes, greater than 50% time spent in diet and lifestyle modification counseling and coordination of care.      Please note that this dictation was created using voice recognition software. I have made every reasonable attempt to correct obvious errors, but expect that there are errors of grammar and possible content that I did not discover before finalizing note.

## 2018-09-05 DIAGNOSIS — I10 ESSENTIAL HYPERTENSION: ICD-10-CM

## 2018-09-05 DIAGNOSIS — E78.5 HYPERLIPIDEMIA, UNSPECIFIED HYPERLIPIDEMIA TYPE: ICD-10-CM

## 2018-09-05 RX ORDER — ATORVASTATIN CALCIUM 10 MG/1
10 TABLET, FILM COATED ORAL EVERY EVENING
Qty: 90 TAB | Refills: 3 | Status: SHIPPED | OUTPATIENT
Start: 2018-09-05 | End: 2019-09-05 | Stop reason: SDUPTHER

## 2018-09-05 RX ORDER — ATORVASTATIN CALCIUM 10 MG/1
10 TABLET, FILM COATED ORAL EVERY EVENING
Qty: 90 TAB | Refills: 3 | Status: CANCELLED | OUTPATIENT
Start: 2018-09-05

## 2018-09-10 ENCOUNTER — TELEMEDICINE2 (OUTPATIENT)
Dept: MEDICAL GROUP | Age: 58
End: 2018-09-10
Payer: COMMERCIAL

## 2018-09-10 ENCOUNTER — NON-PROVIDER VISIT (OUTPATIENT)
Dept: MEDICAL GROUP | Facility: CLINIC | Age: 58
End: 2018-09-10
Payer: COMMERCIAL

## 2018-09-10 ENCOUNTER — TELEMEDICINE ORIGINATING SITE VISIT (OUTPATIENT)
Dept: MEDICAL GROUP | Facility: CLINIC | Age: 58
End: 2018-09-10
Payer: COMMERCIAL

## 2018-09-10 VITALS
HEART RATE: 70 BPM | BODY MASS INDEX: 35.99 KG/M2 | HEIGHT: 69 IN | WEIGHT: 243 LBS | DIASTOLIC BLOOD PRESSURE: 77 MMHG | OXYGEN SATURATION: 95 % | SYSTOLIC BLOOD PRESSURE: 119 MMHG | TEMPERATURE: 97.5 F

## 2018-09-10 DIAGNOSIS — E11.9 DIABETES MELLITUS WITHOUT COMPLICATION (HCC): ICD-10-CM

## 2018-09-10 DIAGNOSIS — E66.09 CLASS 2 OBESITY DUE TO EXCESS CALORIES WITHOUT SERIOUS COMORBIDITY WITH BODY MASS INDEX (BMI) OF 35.0 TO 35.9 IN ADULT: ICD-10-CM

## 2018-09-10 DIAGNOSIS — R73.9 HYPERGLYCEMIA: ICD-10-CM

## 2018-09-10 DIAGNOSIS — E11.9 TYPE 2 DIABETES MELLITUS WITHOUT COMPLICATION, WITHOUT LONG-TERM CURRENT USE OF INSULIN (HCC): ICD-10-CM

## 2018-09-10 PROBLEM — E66.812 CLASS 2 OBESITY DUE TO EXCESS CALORIES WITHOUT SERIOUS COMORBIDITY IN ADULT: Status: ACTIVE | Noted: 2018-09-10

## 2018-09-10 LAB
HBA1C MFR BLD: 5.7 % (ref ?–5.8)
INT CON NEG: NEGATIVE
INT CON POS: POSITIVE

## 2018-09-10 PROCEDURE — 99213 OFFICE O/P EST LOW 20 MIN: CPT | Performed by: INTERNAL MEDICINE

## 2018-09-10 PROCEDURE — 83036 HEMOGLOBIN GLYCOSYLATED A1C: CPT | Performed by: INTERNAL MEDICINE

## 2018-09-10 NOTE — PROGRESS NOTES
CC: Follow-up diabetes and weight loss    Verified identification with patient. Secured video conference with RN presenter in [facility]      HPI:   Gisel presents today with the following.    1. Type 2 diabetes mellitus without complication, without long-term current use of insulin ([facility])  Patient started metformin 500 mg by mouth daily on last visit.  Hemoglobin A1c 5.6, down from 6.9  Patient is now following a strict diabetic diet. Major changes include eating oatmeal in the morning, apples and nuts as a snack, increased salads and veggies, decrease portion size. Patient is now walking 3-4 miles a day. Blood sugars range in the 120-140 range in the morning. Low of 110.  Labs to include lipid panel, CMP completed in April.  Up-to-date with diabetic retinal exam in April 2018    2. Class 2 obesity due to excess calories without serious comorbidity with body mass index (BMI) of 35.0 to 35.9 in adult  Current weight 243 pounds, down from 252 pounds. Current BMI 35.88. Patient has made diet and lifestyle modifications and has been able to lose 9 pounds. Patient's goal is to lose 10 pounds in the next 3 months. Long-term goal to be 180 pounds.        Patient Active Problem List    Diagnosis Date Noted   • Class 2 obesity due to excess calories without serious comorbidity in adult 09/10/2018   • Type 2 diabetes mellitus without complication ([facility]) 08/28/2017   • Palpitations 05/05/2017   • Vitamin D deficiency 02/27/2017   • Coronary artery disease, non-occlusive 12/16/2016   • Dyslipidemia 12/16/2016   • Hyperglycemia 10/31/2016   • Acute bronchitis due to infection 10/03/2016   • Hypothyroidism 06/06/2016   • SVT (supraventricular tachycardia) ([facility]) 10/10/2014   • Sleep disorder 06/20/2014   • PAF (paroxysmal atrial fibrillation) ([facility]) 12/13/2013   • Obese 12/13/2013       Current Outpatient Prescriptions   Medication Sig Dispense Refill   • atorvastatin (LIPITOR) 10 MG Tab Take 1 Tab by mouth every evening. 90 Tab  "3   • metoprolol (LOPRESSOR) 25 MG Tab Take 1 Tab by mouth 2 times a day. 180 Tab 3   • metFORMIN (GLUCOPHAGE) 500 MG Tab Take 1 Tab by mouth every day. 30 Tab 3   • levothyroxine (SYNTHROID) 112 MCG Tab Take 1 Tab by mouth every morning before breakfast. 30 Tab 11   • vitamin D (CHOLECALCIFEROL) 1000 UNIT TABS Take 2,000 Units by mouth every day.     • aspirin (ASA) 81 MG CHEW Take 1 Tab by mouth every day. 100 Tab    • CINNAMON PO Take 2 Capsule by mouth every day.     • EPIPEN 2-YVROSE 0.3 MG/0.3ML Solution Auto-injector solution for injection      • AFLURIA Suspension injection      • amoxicillin (AMOXIL) 875 MG tablet Take 1 Tab by mouth 2 times a day. (Patient not taking: Reported on 5/4/2018) 20 Tab 0   • albuterol 108 (90 BASE) MCG/ACT Aero Soln inhalation aerosol Inhale 2 Puffs by mouth every 6 hours as needed for Shortness of Breath. 8.5 g 1   • amoxicillin (AMOXIL) 875 MG tablet Take 1 Tab by mouth 2 times a day. (Patient not taking: Reported on 5/4/2018) 20 Tab 0     No current facility-administered medications for this visit.          Allergies as of 09/10/2018 - Reviewed 09/10/2018   Allergen Reaction Noted   • Other food  05/04/2018   • Doxycycline  10/10/2013        ROS: As per HPI. Denies cardiopulmonary, GI, neurologic symptoms.    /77   Pulse 70   Temp 36.4 °C (97.5 °F)   Ht 1.753 m (5' 9\")   Wt 110.2 kg (243 lb)   SpO2 95%   BMI 35.88 kg/m²     Physical Exam:  Gen:         Alert and oriented, No apparent distress.      Assessment and Plan.   58 y.o. female with the following issues.    1. Type 2 diabetes mellitus without complication, without long-term current use of insulin (HCC)  Continue metformin 500 mg daily  Continue to monitor blood sugars, goal is to reach blood sugars between   Continue diabetic diet, weight loss efforts and exercise    2. Class 2 obesity due to excess calories without serious comorbidity with body mass index (BMI) of 35.0 to 35.9 in adult  Continue " current exercise routine, add core strengthening  Weight loss goal of 10 pounds in the next 3 months.  RTC in January after the holidays.    Total visit time, 15 minutes, time spent in counseling on diet and lifestyle modifications.            Please note that this dictation was created using voice recognition software. I have made every reasonable attempt to correct obvious errors, but expect that there are errors of grammar and possible content that I did not discover before finalizing note.

## 2018-09-10 NOTE — NON-PROVIDER
Gisel Montez is a 58 y.o. female here for a non-provider visit for A1C    If abnormal was an in office provider notified today (if so, indicate provider)? Yes  Routed to PCP? Yes

## 2019-01-08 DIAGNOSIS — E03.9 HYPOTHYROIDISM, UNSPECIFIED TYPE: ICD-10-CM

## 2019-01-08 RX ORDER — LEVOTHYROXINE SODIUM 112 UG/1
112 TABLET ORAL
Qty: 30 TAB | Refills: 11 | Status: SHIPPED | OUTPATIENT
Start: 2019-01-08 | End: 2019-12-24 | Stop reason: SDUPTHER

## 2019-01-08 NOTE — TELEPHONE ENCOUNTER
Was the patient seen in the last year in this department? Yes    Does patient have an active prescription for medications requested? Yes    Received Request Via: Pharmacy       Requested Prescriptions     Pending Prescriptions Disp Refills   • levothyroxine (SYNTHROID) 112 MCG Tab 30 Tab 11     Sig: Take 1 Tab by mouth every morning before breakfast.

## 2019-01-09 ENCOUNTER — NON-PROVIDER VISIT (OUTPATIENT)
Dept: MEDICAL GROUP | Facility: CLINIC | Age: 59
End: 2019-01-09
Payer: COMMERCIAL

## 2019-01-09 ENCOUNTER — TELEMEDICINE ORIGINATING SITE VISIT (OUTPATIENT)
Dept: MEDICAL GROUP | Facility: CLINIC | Age: 59
End: 2019-01-09
Payer: COMMERCIAL

## 2019-01-09 ENCOUNTER — TELEMEDICINE2 (OUTPATIENT)
Dept: MEDICAL GROUP | Age: 59
End: 2019-01-09
Payer: COMMERCIAL

## 2019-01-09 VITALS
WEIGHT: 248 LBS | HEART RATE: 60 BPM | HEIGHT: 69 IN | TEMPERATURE: 97.5 F | OXYGEN SATURATION: 98 % | DIASTOLIC BLOOD PRESSURE: 78 MMHG | BODY MASS INDEX: 36.73 KG/M2 | SYSTOLIC BLOOD PRESSURE: 129 MMHG | RESPIRATION RATE: 16 BRPM

## 2019-01-09 DIAGNOSIS — R73.9 HYPERGLYCEMIA: ICD-10-CM

## 2019-01-09 DIAGNOSIS — E66.09 CLASS 2 OBESITY DUE TO EXCESS CALORIES WITHOUT SERIOUS COMORBIDITY IN ADULT, UNSPECIFIED BMI: ICD-10-CM

## 2019-01-09 DIAGNOSIS — E11.9 TYPE 2 DIABETES MELLITUS WITHOUT COMPLICATION, WITHOUT LONG-TERM CURRENT USE OF INSULIN (HCC): ICD-10-CM

## 2019-01-09 DIAGNOSIS — J01.00 ACUTE MAXILLARY SINUSITIS, RECURRENCE NOT SPECIFIED: ICD-10-CM

## 2019-01-09 DIAGNOSIS — E55.9 VITAMIN D DEFICIENCY: ICD-10-CM

## 2019-01-09 DIAGNOSIS — E03.8 OTHER SPECIFIED HYPOTHYROIDISM: ICD-10-CM

## 2019-01-09 LAB
HBA1C MFR BLD: 6.1 % (ref ?–5.8)
INT CON NEG: NEGATIVE
INT CON POS: POSITIVE

## 2019-01-09 PROCEDURE — 99214 OFFICE O/P EST MOD 30 MIN: CPT | Performed by: INTERNAL MEDICINE

## 2019-01-09 PROCEDURE — 83036 HEMOGLOBIN GLYCOSYLATED A1C: CPT | Performed by: INTERNAL MEDICINE

## 2019-01-09 RX ORDER — AMOXICILLIN 875 MG/1
875 TABLET, COATED ORAL 2 TIMES DAILY
Qty: 20 TAB | Refills: 0 | Status: SHIPPED | OUTPATIENT
Start: 2019-01-09 | End: 2019-01-23

## 2019-01-09 NOTE — PROGRESS NOTES
CC: 3-month follow-up visit    Verified identification with patient. Secured video conference with RN presenter in [facility]      HPI:   Gisel presents today with the following.    1. Acute maxillary sinusitis, recurrence not specified  Patient presents with a 3-4-week history of thick nasal discharge, nasal congestion, sore throat secondary to postnasal drip, mild cough.  Using over-the-counter nasal spray.  Not resolving.  Causing mild headaches.  Patient denies shortness of breath, chest pain, dizziness or palpitations.  No fevers or chills.  No history of allergies.  No sick contacts.    2. Class 2 obesity due to excess calories without serious comorbidity in adult, unspecified BMI  Current weight 248, up from 243.  BMI 36.62  Labs due  Diabetic retinal scan completed April 2018.    3. Type 2 diabetes mellitus without complication, without long-term current use of insulin ([facility])  Patient taking metformin 500 mg daily.  Hemoglobin A1c 6.1, up from 5.6.  Difficulty following diabetic diet during the holidays.  Stress eater.    4. Other specified hypothyroidism  Patient taking Synthroid 112 MCG's daily.  Complains of mild depression, fatigue, bit of weight gain.        Patient Active Problem List    Diagnosis Date Noted   • Class 2 obesity due to excess calories without serious comorbidity in adult 09/10/2018   • Type 2 diabetes mellitus without complication ([facility]) 08/28/2017   • Palpitations 05/05/2017   • Vitamin D deficiency 02/27/2017   • Coronary artery disease, non-occlusive 12/16/2016   • Dyslipidemia 12/16/2016   • Hyperglycemia 10/31/2016   • Acute bronchitis due to infection 10/03/2016   • Hypothyroidism 06/06/2016   • SVT (supraventricular tachycardia) ([facility]) 10/10/2014   • Sleep disorder 06/20/2014   • PAF (paroxysmal atrial fibrillation) ([facility]) 12/13/2013   • Obese 12/13/2013       Current Outpatient Prescriptions   Medication Sig Dispense Refill   • amoxicillin (AMOXIL) 875 MG tablet Take 1 Tab by  "mouth 2 times a day. 20 Tab 0   • levothyroxine (SYNTHROID) 112 MCG Tab Take 1 Tab by mouth every morning before breakfast. 30 Tab 11   • metFORMIN (GLUCOPHAGE) 500 MG Tab Take 1 Tab by mouth every day. 30 Tab 5   • atorvastatin (LIPITOR) 10 MG Tab Take 1 Tab by mouth every evening. 90 Tab 3   • metoprolol (LOPRESSOR) 25 MG Tab Take 1 Tab by mouth 2 times a day. 180 Tab 3   • CINNAMON PO Take 2 Capsule by mouth every day.     • EPIPEN 2-YVROSE 0.3 MG/0.3ML Solution Auto-injector solution for injection      • AFLURIA Suspension injection      • amoxicillin (AMOXIL) 875 MG tablet Take 1 Tab by mouth 2 times a day. (Patient not taking: Reported on 5/4/2018) 20 Tab 0   • albuterol 108 (90 BASE) MCG/ACT Aero Soln inhalation aerosol Inhale 2 Puffs by mouth every 6 hours as needed for Shortness of Breath. 8.5 g 1   • amoxicillin (AMOXIL) 875 MG tablet Take 1 Tab by mouth 2 times a day. (Patient not taking: Reported on 5/4/2018) 20 Tab 0   • vitamin D (CHOLECALCIFEROL) 1000 UNIT TABS Take 2,000 Units by mouth every day.     • aspirin (ASA) 81 MG CHEW Take 1 Tab by mouth every day. 100 Tab      No current facility-administered medications for this visit.          Allergies as of 01/09/2019 - Reviewed 01/09/2019   Allergen Reaction Noted   • Other food  05/04/2018   • Doxycycline  10/10/2013        ROS: As per HPI.  Denies all other cardiopulmonary, GI, neurologic symptoms.    /78 (BP Location: Right arm, Patient Position: Sitting)   Pulse 60   Temp 36.4 °C (97.5 °F)   Resp 16   Ht 1.753 m (5' 9\")   Wt 112.5 kg (248 lb)   SpO2 98%   BMI 36.62 kg/m²     Physical Exam:  Gen:         Alert and oriented, No apparent distress.  HEENT:        No Lymphadenopathy or Bruits.  No thyromegaly.  Oropharynx with erythema, no exudate.  Positive postnasal drip.  Mild TTP to maxillary sinus bilaterally.  Tympanic membranes intact bilaterally.  Lungs:     Clear to auscultation bilaterally, no wheezes rhonchi or crackles  CV:         "  Regular rate and rhythm. No murmurs, rubs or gallops.  Abd:         Soft non tender, non distended. Normal active bowel sounds.  No  Hepatosplenomegaly, No pulsatile masses.                   Ext:          No clubbing, cyanosis, edema.      Assessment and Plan.   58 y.o. female with the following issues.    1. Acute maxillary sinusitis, recurrence not specified  Ibuprofen as needed    - amoxicillin (AMOXIL) 875 MG tablet; Take 1 Tab by mouth 2 times a day.  Dispense: 20 Tab; Refill: 0    2. Class 2 obesity due to excess calories without serious comorbidity in adult, unspecified BMI  Counseled patient on diet and lifestyle modifications.    3. Type 2 diabetes mellitus without complication, without long-term current use of insulin (HCC)  Continue Metformin 500 mg daily  Restart diabetic diet as discussed and counseled    - MICROALB/CREAT RATIO, TIMED UR  - HEMOGLOBIN A1C; Future  - Lipid Profile; Future  - COMP METABOLIC PANEL; Future    4. Other specified hypothyroidism  Continue current dose of Synthroid    - CBC WITH DIFFERENTIAL; Future  - THYROID PANEL WITH TSH    5. Vitamin D deficiency    - VITAMIN D 25-HYDROXY            Please note that this dictation was created using voice recognition software. I have made every reasonable attempt to correct obvious errors, but expect that there are errors of grammar and possible content that I did not discover before finalizing note.

## 2019-01-23 ENCOUNTER — TELEMEDICINE ORIGINATING SITE VISIT (OUTPATIENT)
Dept: MEDICAL GROUP | Facility: CLINIC | Age: 59
End: 2019-01-23
Payer: COMMERCIAL

## 2019-01-23 ENCOUNTER — NON-PROVIDER VISIT (OUTPATIENT)
Dept: MEDICAL GROUP | Facility: CLINIC | Age: 59
End: 2019-01-23
Payer: COMMERCIAL

## 2019-01-23 ENCOUNTER — TELEMEDICINE2 (OUTPATIENT)
Dept: MEDICAL GROUP | Age: 59
End: 2019-01-23
Payer: COMMERCIAL

## 2019-01-23 VITALS
DIASTOLIC BLOOD PRESSURE: 79 MMHG | BODY MASS INDEX: 36.73 KG/M2 | OXYGEN SATURATION: 95 % | HEIGHT: 69 IN | SYSTOLIC BLOOD PRESSURE: 124 MMHG | RESPIRATION RATE: 16 BRPM | WEIGHT: 248 LBS | TEMPERATURE: 97.6 F | HEART RATE: 70 BPM

## 2019-01-23 DIAGNOSIS — N30.00 ACUTE CYSTITIS WITHOUT HEMATURIA: ICD-10-CM

## 2019-01-23 DIAGNOSIS — N30.01 ACUTE CYSTITIS WITH HEMATURIA: ICD-10-CM

## 2019-01-23 DIAGNOSIS — R73.9 HYPERGLYCEMIA: ICD-10-CM

## 2019-01-23 LAB
APPEARANCE UR: NORMAL
BILIRUB UR STRIP-MCNC: NEGATIVE MG/DL
COLOR UR AUTO: YELLOW
GLUCOSE UR STRIP.AUTO-MCNC: NEGATIVE MG/DL
KETONES UR STRIP.AUTO-MCNC: NEGATIVE MG/DL
LEUKOCYTE ESTERASE UR QL STRIP.AUTO: NORMAL
NITRITE UR QL STRIP.AUTO: NEGATIVE
PH UR STRIP.AUTO: 6.5 [PH] (ref 5–8)
PROT UR QL STRIP: 100 MG/DL
RBC UR QL AUTO: NORMAL
SP GR UR STRIP.AUTO: 1.02
UROBILINOGEN UR STRIP-MCNC: 0.2 MG/DL

## 2019-01-23 PROCEDURE — 99213 OFFICE O/P EST LOW 20 MIN: CPT | Performed by: INTERNAL MEDICINE

## 2019-01-23 PROCEDURE — 81002 URINALYSIS NONAUTO W/O SCOPE: CPT | Performed by: INTERNAL MEDICINE

## 2019-01-23 RX ORDER — CIPROFLOXACIN 250 MG/1
250 TABLET, FILM COATED ORAL 2 TIMES DAILY
Qty: 6 TAB | Refills: 0 | Status: SHIPPED | OUTPATIENT
Start: 2019-01-23 | End: 2019-03-18 | Stop reason: SDUPTHER

## 2019-01-23 RX ORDER — PHENAZOPYRIDINE HYDROCHLORIDE 200 MG/1
200 TABLET, FILM COATED ORAL 3 TIMES DAILY PRN
Qty: 6 TAB | Refills: 0 | Status: SHIPPED | OUTPATIENT
Start: 2019-01-23 | End: 2019-03-18 | Stop reason: SDUPTHER

## 2019-01-23 NOTE — PROGRESS NOTES
CC: Dysuria    Verified identification with patient. Secured video conference with RN presenter in [facility]      HPI:   Gisel presents today with the following.    1. Acute cystitis without hematuria  Patient presents to the [facility] with a 3-day history of increased urinary frequency and dysuria of moderate severity.  Associated symptoms lower abdominal pain, low back pain, low-grade fevers and chills.  Taking over-the-counter cranberry juice.  No history of recent urinary tract infections.  No history of kidney stones.      Patient Active Problem List    Diagnosis Date Noted   • Class 2 obesity due to excess calories without serious comorbidity in adult 09/10/2018   • Type 2 diabetes mellitus without complication ([facility]) 08/28/2017   • Palpitations 05/05/2017   • Vitamin D deficiency 02/27/2017   • Coronary artery disease, non-occlusive 12/16/2016   • Dyslipidemia 12/16/2016   • Hyperglycemia 10/31/2016   • Acute bronchitis due to infection 10/03/2016   • Hypothyroidism 06/06/2016   • SVT (supraventricular tachycardia) ([facility]) 10/10/2014   • Sleep disorder 06/20/2014   • PAF (paroxysmal atrial fibrillation) ([facility]) 12/13/2013   • Obese 12/13/2013       Current Outpatient Prescriptions   Medication Sig Dispense Refill   • phenazopyridine (PYRIDIUM) 200 MG Tab Take 1 Tab by mouth 3 times a day as needed. 6 Tab 0   • ciprofloxacin (CIPRO) 250 MG Tab Take 1 Tab by mouth 2 times a day. 6 Tab 0   • levothyroxine (SYNTHROID) 112 MCG Tab Take 1 Tab by mouth every morning before breakfast. 30 Tab 11   • metFORMIN (GLUCOPHAGE) 500 MG Tab Take 1 Tab by mouth every day. 30 Tab 5   • atorvastatin (LIPITOR) 10 MG Tab Take 1 Tab by mouth every evening. 90 Tab 3   • metoprolol (LOPRESSOR) 25 MG Tab Take 1 Tab by mouth 2 times a day. 180 Tab 3   • CINNAMON PO Take 2 Capsule by mouth every day.     • vitamin D (CHOLECALCIFEROL) 1000 UNIT TABS Take 2,000 Units by mouth every day.     • aspirin (ASA) 81 MG CHEW Take 1 Tab by  "mouth every day. 100 Tab    • EPIPEN 2-YVROSE 0.3 MG/0.3ML Solution Auto-injector solution for injection      • AFLURIA Suspension injection        No current facility-administered medications for this visit.          Allergies as of 01/23/2019 - Reviewed 01/23/2019   Allergen Reaction Noted   • Other food  05/04/2018   • Doxycycline  10/10/2013        ROS: As per HPI.  Denies cardiopulmonary, GI, neurologic symptoms    /79 (BP Location: Right arm, Patient Position: Sitting)   Pulse 70   Temp 36.4 °C (97.6 °F) (Temporal)   Resp 16   Ht 1.753 m (5' 9\")   Wt 112.5 kg (248 lb)   SpO2 95%   BMI 36.62 kg/m²     Physical Exam:  Gen:         Alert and oriented, No apparent distress.  Lungs:     Clear to auscultation bilaterally, no wheezes rhonchi or crackles  CV:          Regular rate and rhythm. No murmurs, rubs or gallops.  Abd:         Soft abdomen.  Tenderness in the suprapubic region, no rebound no guarding.  Positive bowel sounds.  Positive CVA tenderness right greater than left.                 Ext:          No clubbing, cyanosis, edema.      Assessment and Plan.   58 y.o. female with the following issues.    1. Acute cystitis without hematuria  Urinalysis shows nitrite negative.  Leukocyte esterase large.  Blood large.  Increase fluids, cranberry juice  Rx.  Cipro x 3 days  Send urine for culture and sensitivity    - phenazopyridine (PYRIDIUM) 200 MG Tab; Take 1 Tab by mouth 3 times a day as needed.  Dispense: 6 Tab; Refill: 0  - ciprofloxacin (CIPRO) 250 MG Tab; Take 1 Tab by mouth 2 times a day.  Dispense: 6 Tab; Refill: 0            Please note that this dictation was created using voice recognition software. I have made every reasonable attempt to correct obvious errors, but expect that there are errors of grammar and possible content that I did not discover before finalizing note.   "

## 2019-01-26 LAB
BACTERIA UR CULT: ABNORMAL
BACTERIA UR CULT: ABNORMAL
OTHER ANTIBIOTIC SUSC ISLT: ABNORMAL

## 2019-02-28 ENCOUNTER — TELEMEDICINE2 (OUTPATIENT)
Dept: URGENT CARE | Facility: CLINIC | Age: 59
End: 2019-02-28
Payer: COMMERCIAL

## 2019-02-28 ENCOUNTER — TELEMEDICINE ORIGINATING SITE VISIT (OUTPATIENT)
Dept: MEDICAL GROUP | Facility: CLINIC | Age: 59
End: 2019-02-28
Payer: COMMERCIAL

## 2019-02-28 VITALS
TEMPERATURE: 98.1 F | BODY MASS INDEX: 36.73 KG/M2 | DIASTOLIC BLOOD PRESSURE: 71 MMHG | OXYGEN SATURATION: 94 % | HEART RATE: 73 BPM | HEIGHT: 69 IN | SYSTOLIC BLOOD PRESSURE: 115 MMHG | WEIGHT: 248 LBS

## 2019-02-28 DIAGNOSIS — J40 BRONCHITIS: ICD-10-CM

## 2019-02-28 DIAGNOSIS — J20.9 ACUTE BRONCHITIS DUE TO INFECTION: ICD-10-CM

## 2019-02-28 PROCEDURE — 99214 OFFICE O/P EST MOD 30 MIN: CPT | Performed by: NURSE PRACTITIONER

## 2019-02-28 RX ORDER — ALBUTEROL SULFATE 90 UG/1
1-2 AEROSOL, METERED RESPIRATORY (INHALATION) EVERY 6 HOURS PRN
Qty: 8.5 G | Refills: 0 | Status: SHIPPED | OUTPATIENT
Start: 2019-02-28 | End: 2019-03-10

## 2019-02-28 RX ORDER — AMOXICILLIN 875 MG/1
TABLET, COATED ORAL
COMMUNITY
Start: 2019-01-09 | End: 2019-03-18

## 2019-02-28 RX ORDER — BENZONATATE 100 MG/1
100 CAPSULE ORAL 3 TIMES DAILY PRN
Qty: 15 CAP | Refills: 0 | Status: SHIPPED | OUTPATIENT
Start: 2019-02-28 | End: 2019-03-05

## 2019-02-28 ASSESSMENT — ENCOUNTER SYMPTOMS
DIAPHORESIS: 0
FEVER: 0
COUGH: 1
SPUTUM PRODUCTION: 0
WEAKNESS: 0
SHORTNESS OF BREATH: 0
CHILLS: 0
HEMOPTYSIS: 0
MYALGIAS: 0

## 2019-03-01 NOTE — PROGRESS NOTES
"Subjective:      Gisel Montze is a 58 y.o. female who presents with Cough (x 7 days)            Patient presents at the [facility] for a telemedicine encounter over a secure, encrypted videoconferencing network.  RN on-site for encounter.  Patient comes in today with a 7 day history of chest congestion with cough.  Associated factors: fatigue.  Patient has tried robitussin with minimal relief.  Denies any fever, chills, chest pain or shortness of breath.  Former smoker.  No known asthma or COPD.  Recent course of amoxicillin 1/9/19 for sinusitis and Cipro 1/23/19 for UTI.          Review of Systems   Constitutional: Positive for malaise/fatigue. Negative for chills, diaphoresis and fever.   HENT: Negative for congestion and ear pain.    Respiratory: Positive for cough. Negative for hemoptysis, sputum production and shortness of breath.    Cardiovascular: Negative for chest pain.   Musculoskeletal: Negative for myalgias.   Skin: Negative for rash.   Neurological: Negative for weakness.     Medications, Allergies, and current problem list reviewed today in Epic     Objective:     /71 (BP Location: Left arm, Patient Position: Sitting)   Pulse 73   Temp 36.7 °C (98.1 °F) (Tympanic)   Ht 1.753 m (5' 9\")   Wt 112.5 kg (248 lb)   SpO2 94%   BMI 36.62 kg/m²      Physical Exam   Constitutional: She is oriented to person, place, and time. She appears well-developed and well-nourished. No distress.   HENT:   Head: Normocephalic.   Right Ear: External ear normal.   Left Ear: External ear normal.   Mouth/Throat: Oropharynx is clear and moist. No oropharyngeal exudate.   Eyes: Pupils are equal, round, and reactive to light. Conjunctivae are normal. Right eye exhibits no discharge. Left eye exhibits no discharge. No scleral icterus.   Neck: Neck supple. No JVD present. No tracheal deviation present. No thyromegaly present.   Cardiovascular: Normal rate, regular rhythm and normal heart sounds.  Exam reveals no gallop " and no friction rub.    No murmur heard.  Pulmonary/Chest: Effort normal. No stridor. No respiratory distress. She has no wheezes. She has no rales. She exhibits no tenderness.   Coarse breath sounds through upper fields.  Harsh wet sounding cough.     Musculoskeletal: She exhibits no edema.   Lymphadenopathy:     She has no cervical adenopathy.   Neurological: She is alert and oriented to person, place, and time.   Skin: Skin is warm and dry. No rash noted. She is not diaphoretic. No erythema.   Vitals reviewed.              Assessment/Plan:     1. Bronchitis    - albuterol 108 (90 Base) MCG/ACT Aero Soln inhalation aerosol; Inhale 1-2 Puffs by mouth every 6 hours as needed for Shortness of Breath for up to 10 days.  Dispense: 8.5 g; Refill: 0  - benzonatate (TESSALON) 100 MG Cap; Take 1 Cap by mouth 3 times a day as needed for up to 5 days.  Dispense: 15 Cap; Refill: 0    Advised patient that based on the history and exam findings, this is likely a self-limiting viral illness.  There is no indication for antibiotics at this time.  Tessalon as prescribed.  Albuterol as prescribed.  OTC cold medications prn symptom management.  Maintain adequate po hydration.  RTC in 7 days if symptoms persist, sooner if worse.  ED precautions discussed.  Patient verbalized understanding of and agreed with plan of care.

## 2019-03-11 DIAGNOSIS — E11.9 TYPE 2 DIABETES MELLITUS WITHOUT COMPLICATION, WITHOUT LONG-TERM CURRENT USE OF INSULIN (HCC): ICD-10-CM

## 2019-03-11 NOTE — TELEPHONE ENCOUNTER
Was the patient seen in the last year in this department? Yes    Does patient have an active prescription for medications requested? Yes    Received Request Via: Patient       PT REQUESTING A 1 MONTH SUPPLY TO GET HER TO HER NEXT APPT.

## 2019-03-18 ENCOUNTER — OFFICE VISIT (OUTPATIENT)
Dept: MEDICAL GROUP | Facility: CLINIC | Age: 59
End: 2019-03-18
Payer: COMMERCIAL

## 2019-03-18 VITALS
WEIGHT: 248 LBS | BODY MASS INDEX: 36.73 KG/M2 | HEART RATE: 72 BPM | HEIGHT: 69 IN | OXYGEN SATURATION: 95 % | DIASTOLIC BLOOD PRESSURE: 70 MMHG | SYSTOLIC BLOOD PRESSURE: 133 MMHG | TEMPERATURE: 97.7 F

## 2019-03-18 DIAGNOSIS — N30.00 ACUTE CYSTITIS WITHOUT HEMATURIA: ICD-10-CM

## 2019-03-18 DIAGNOSIS — N39.0 URINARY TRACT INFECTION WITHOUT HEMATURIA, SITE UNSPECIFIED: ICD-10-CM

## 2019-03-18 LAB
APPEARANCE UR: CLEAR
BILIRUB UR STRIP-MCNC: NEGATIVE MG/DL
COLOR UR AUTO: YELLOW
GLUCOSE UR STRIP.AUTO-MCNC: NEGATIVE MG/DL
KETONES UR STRIP.AUTO-MCNC: NEGATIVE MG/DL
LEUKOCYTE ESTERASE UR QL STRIP.AUTO: NORMAL
NITRITE UR QL STRIP.AUTO: NEGATIVE
PH UR STRIP.AUTO: 5.5 [PH] (ref 5–8)
PROT UR QL STRIP: NEGATIVE MG/DL
RBC UR QL AUTO: NEGATIVE
SP GR UR STRIP.AUTO: 1.01
UROBILINOGEN UR STRIP-MCNC: 0.2 MG/DL

## 2019-03-18 PROCEDURE — 81002 URINALYSIS NONAUTO W/O SCOPE: CPT | Performed by: PHYSICIAN ASSISTANT

## 2019-03-18 PROCEDURE — 99213 OFFICE O/P EST LOW 20 MIN: CPT | Performed by: PHYSICIAN ASSISTANT

## 2019-03-18 RX ORDER — PHENAZOPYRIDINE HYDROCHLORIDE 200 MG/1
200 TABLET, FILM COATED ORAL 3 TIMES DAILY PRN
Qty: 6 TAB | Refills: 0 | Status: SHIPPED | OUTPATIENT
Start: 2019-03-18 | End: 2019-08-09

## 2019-03-18 RX ORDER — CIPROFLOXACIN 250 MG/1
250 TABLET, FILM COATED ORAL 2 TIMES DAILY
Qty: 6 TAB | Refills: 0 | Status: SHIPPED | OUTPATIENT
Start: 2019-03-18 | End: 2019-08-09

## 2019-03-18 NOTE — PROGRESS NOTES
"cc:  UTI symptoms    Subjective:     Gisel Montez is a 58 y.o. female presenting for UTI symptoms.      Patient began to have symptoms 3 days ago and had blood in her urine.  She felt like she was urinating \"razor blades\"  She will either urinate large amounts or small amounts. She was running a low grade fever yesterday.  She denies nausea vomiting and diarrhea.  She denies any other symptoms but does admit to back pain.  Nothing makes symptoms better or worse.  Symptoms are constant.      Review of systems:  See above.       Current Outpatient Prescriptions:   •  Albuterol Sulfate (PROAIR HFA INH), Inhale  by mouth., Disp: , Rfl:   •  metFORMIN (GLUCOPHAGE) 500 MG Tab, Take 1 Tab by mouth every day., Disp: 30 Tab, Rfl: 5  •  levothyroxine (SYNTHROID) 112 MCG Tab, Take 1 Tab by mouth every morning before breakfast., Disp: 30 Tab, Rfl: 11  •  atorvastatin (LIPITOR) 10 MG Tab, Take 1 Tab by mouth every evening., Disp: 90 Tab, Rfl: 3  •  metoprolol (LOPRESSOR) 25 MG Tab, Take 1 Tab by mouth 2 times a day., Disp: 180 Tab, Rfl: 3  •  CINNAMON PO, Take 2 Capsule by mouth every day., Disp: , Rfl:   •  AFLURIA Suspension injection, , Disp: , Rfl:   •  vitamin D (CHOLECALCIFEROL) 1000 UNIT TABS, Take 2,000 Units by mouth every day., Disp: , Rfl:   •  aspirin (ASA) 81 MG CHEW, Take 1 Tab by mouth every day., Disp: 100 Tab, Rfl:   •  amoxicillin (AMOXIL) 875 MG tablet, , Disp: , Rfl:   •  phenazopyridine (PYRIDIUM) 200 MG Tab, Take 1 Tab by mouth 3 times a day as needed. (Patient not taking: Reported on 2/28/2019), Disp: 6 Tab, Rfl: 0  •  ciprofloxacin (CIPRO) 250 MG Tab, Take 1 Tab by mouth 2 times a day. (Patient not taking: Reported on 2/28/2019), Disp: 6 Tab, Rfl: 0  •  EPIPEN 2-YVROSE 0.3 MG/0.3ML Solution Auto-injector solution for injection, , Disp: , Rfl:     Allergies, past medical history, past surgical history, family history, social history reviewed and updated    Objective:     Vitals: /70 (BP Location: Right " "arm, Patient Position: Sitting)   Pulse 72   Temp 36.5 °C (97.7 °F) (Temporal)   Ht 1.753 m (5' 9\")   Wt 112.5 kg (248 lb)   SpO2 95%   BMI 36.62 kg/m²   General: Alert, pleasant, NAD  HEENT: Normocephalic.  EOMI, no icterus or pallor.  Conjunctivae and lids normal. External ears normal.   Neck supple.   Abdomen: obese  Skin: Warm, dry, no rashes.  Musculoskeletal: Gait is normal.  Moves all extremities well.  Neuro: Cranial nerves II through XII intact.  No focal deficits noted.   Psych:  Affect/mood is normal, judgement is good, memory is intact, grooming is appropriate.  Urine: Small amount of white blood cells, otherwise negative.    Assessment/Plan:     Gisel was seen today for uti.    Diagnoses and all orders for this visit:    Urinary tract infection without hematuria, site unspecified  -     POCT Urinalysis  We will send urine off for culture.  As we will not have results back for a week, we will go ahead and treat with Cipro and treat symptoms with Pyridium.  If culture is negative, may need to look at possible vaginal atrophy issues.      No Follow-up on file.  "

## 2019-03-23 LAB
BACTERIA UR CULT: ABNORMAL
BACTERIA UR CULT: ABNORMAL
OTHER ANTIBIOTIC SUSC ISLT: ABNORMAL

## 2019-04-18 ENCOUNTER — TELEMEDICINE2 (OUTPATIENT)
Dept: MEDICAL GROUP | Age: 59
End: 2019-04-18
Payer: COMMERCIAL

## 2019-04-18 ENCOUNTER — TELEMEDICINE ORIGINATING SITE VISIT (OUTPATIENT)
Dept: MEDICAL GROUP | Facility: CLINIC | Age: 59
End: 2019-04-18
Payer: COMMERCIAL

## 2019-04-18 VITALS
HEART RATE: 66 BPM | WEIGHT: 247 LBS | BODY MASS INDEX: 36.58 KG/M2 | TEMPERATURE: 97.8 F | DIASTOLIC BLOOD PRESSURE: 76 MMHG | HEIGHT: 69 IN | SYSTOLIC BLOOD PRESSURE: 118 MMHG | OXYGEN SATURATION: 95 %

## 2019-04-18 DIAGNOSIS — E11.9 TYPE 2 DIABETES MELLITUS WITHOUT COMPLICATION, WITHOUT LONG-TERM CURRENT USE OF INSULIN (HCC): ICD-10-CM

## 2019-04-18 DIAGNOSIS — E66.01 CLASS 3 SEVERE OBESITY WITH SERIOUS COMORBIDITY IN ADULT, UNSPECIFIED BMI, UNSPECIFIED OBESITY TYPE (HCC): ICD-10-CM

## 2019-04-18 PROCEDURE — 99213 OFFICE O/P EST LOW 20 MIN: CPT | Performed by: INTERNAL MEDICINE

## 2019-04-18 NOTE — PROGRESS NOTES
"CC: Follow-up visit.    Verified identification with patient. Secured video conference with RN presenter in [facility]      HPI:   Gisel presents today with the following.  Patient was last seen in January and did not complete her lab work for this visit.    1. Type 2 diabetes mellitus without complication, without long-term current use of insulin ([facility])  Patient is treated with metformin 500 mg a day.  Patient has not been checking her blood sugars recently.  Patient is stable without any complaints    2. Class 3 severe obesity with serious comorbidity in adult, unspecified BMI, unspecified obesity type ([facility])  Patient's current weight is 247 pounds, BMI of 36.48.  Patient has not been exercising regularly and claims to be a \"stress eater\".  Patient's  was diagnosed with melanoma and she is very worried for him.      Patient Active Problem List    Diagnosis Date Noted   • Acute cystitis without hematuria 03/18/2019   • Class 2 obesity due to excess calories without serious comorbidity in adult 09/10/2018   • Type 2 diabetes mellitus without complication ([facility]) 08/28/2017   • Palpitations 05/05/2017   • Vitamin D deficiency 02/27/2017   • Coronary artery disease, non-occlusive 12/16/2016   • Dyslipidemia 12/16/2016   • Hyperglycemia 10/31/2016   • Acute bronchitis due to infection 10/03/2016   • Hypothyroidism 06/06/2016   • SVT (supraventricular tachycardia) ([facility]) 10/10/2014   • Sleep disorder 06/20/2014   • PAF (paroxysmal atrial fibrillation) ([facility]) 12/13/2013   • Obese 12/13/2013       Current Outpatient Prescriptions   Medication Sig Dispense Refill   • Albuterol Sulfate (PROAIR HFA INH) Inhale  by mouth.     • phenazopyridine (PYRIDIUM) 200 MG Tab Take 1 Tab by mouth 3 times a day as needed. 6 Tab 0   • metFORMIN (GLUCOPHAGE) 500 MG Tab Take 1 Tab by mouth every day. 30 Tab 5   • levothyroxine (SYNTHROID) 112 MCG Tab Take 1 Tab by mouth every morning before breakfast. 30 Tab 11   • atorvastatin (LIPITOR) " "10 MG Tab Take 1 Tab by mouth every evening. 90 Tab 3   • metoprolol (LOPRESSOR) 25 MG Tab Take 1 Tab by mouth 2 times a day. 180 Tab 3   • CINNAMON PO Take 2 Capsule by mouth every day.     • EPIPEN 2-YVROSE 0.3 MG/0.3ML Solution Auto-injector solution for injection      • vitamin D (CHOLECALCIFEROL) 1000 UNIT TABS Take 2,000 Units by mouth every day.     • aspirin (ASA) 81 MG CHEW Take 1 Tab by mouth every day. 100 Tab    • ciprofloxacin (CIPRO) 250 MG Tab Take 1 Tab by mouth 2 times a day. 6 Tab 0   • AFLURIA Suspension injection        No current facility-administered medications for this visit.          Allergies as of 04/18/2019 - Reviewed 04/18/2019   Allergen Reaction Noted   • Other food  05/04/2018   • Doxycycline  10/10/2013        ROS: As per HPI.  Persistent dry cough, related to allergies.  Denies all other cardiopulmonary, GI, neurologic symptoms.    /76 (BP Location: Right arm, Patient Position: Sitting)   Pulse 66   Temp 36.6 °C (97.8 °F) (Temporal)   Ht 1.753 m (5' 9\")   Wt 112 kg (247 lb)   SpO2 95%   BMI 36.48 kg/m²     Physical Exam:  Gen:         Alert and oriented, No apparent distress.  Neck:        No Lymphadenopathy or Bruits.  Lungs:     Clear to auscultation bilaterally, no wheezes rhonchi or crackles  CV:          Regular rate and rhythm. No murmurs, rubs or gallops.  Abd:         Soft non tender, non distended.                  Ext:          No clubbing, cyanosis, edema.  Neuro:      Grossly intact    Assessment and Plan.   58 y.o. female with the following issues.    1. Type 2 diabetes mellitus without complication, without long-term current use of insulin (HCC)  Patient to complete lab work  Start checking blood sugars once daily  Briefly discussed diabetic diet that patient would like to start  Weight loss    2. Class 3 severe obesity with serious comorbidity in adult, unspecified BMI, unspecified obesity type (HCC)  Discussed restarting diet and lifestyle modifications to " include 30-minute walk daily.  Discussed diabetic diet, portion control, low carbohydrate options.              Please note that this dictation was created using voice recognition software. I have made every reasonable attempt to correct obvious errors, but expect that there are errors of grammar and possible content that I did not discover before finalizing note.

## 2019-04-25 ENCOUNTER — NON-PROVIDER VISIT (OUTPATIENT)
Dept: MEDICAL GROUP | Facility: CLINIC | Age: 59
End: 2019-04-25
Payer: COMMERCIAL

## 2019-04-25 DIAGNOSIS — E78.5 DYSLIPIDEMIA: ICD-10-CM

## 2019-04-25 PROCEDURE — 36415 COLL VENOUS BLD VENIPUNCTURE: CPT | Performed by: INTERNAL MEDICINE

## 2019-04-26 LAB
25(OH)D3+25(OH)D2 SERPL-MCNC: 53.8 NG/ML (ref 30–100)
ALBUMIN SERPL-MCNC: 4.1 G/DL (ref 3.5–5.5)
ALBUMIN/GLOB SERPL: 1 {RATIO} (ref 1.2–2.2)
ALP SERPL-CCNC: 89 IU/L (ref 39–117)
ALT SERPL-CCNC: 18 IU/L (ref 0–32)
AST SERPL-CCNC: 23 IU/L (ref 0–40)
BASOPHILS # BLD AUTO: 0 X10E3/UL (ref 0–0.2)
BASOPHILS NFR BLD AUTO: 0 %
BILIRUB SERPL-MCNC: 0.4 MG/DL (ref 0–1.2)
BUN SERPL-MCNC: 13 MG/DL (ref 6–24)
BUN/CREAT SERPL: 20 (ref 9–23)
CALCIUM SERPL-MCNC: 9.4 MG/DL (ref 8.7–10.2)
CHLORIDE SERPL-SCNC: 105 MMOL/L (ref 96–106)
CHOLEST SERPL-MCNC: 104 MG/DL (ref 100–199)
CHOLEST/HDLC SERPL: 2.7 RATIO (ref 0–4.4)
CO2 SERPL-SCNC: 19 MMOL/L (ref 20–29)
CREAT SERPL-MCNC: 0.66 MG/DL (ref 0.57–1)
CREAT UR-MCNC: 113 MG/DL
EOSINOPHIL # BLD AUTO: 0.1 X10E3/UL (ref 0–0.4)
EOSINOPHIL NFR BLD AUTO: 1 %
ERYTHROCYTE [DISTWIDTH] IN BLOOD BY AUTOMATED COUNT: 14 % (ref 12.3–15.4)
FT4I SERPL CALC-MCNC: 2.6 (ref 1.2–4.9)
GLOBULIN SER CALC-MCNC: 4.2 G/DL (ref 1.5–4.5)
GLUCOSE SERPL-MCNC: 129 MG/DL (ref 65–99)
HBA1C MFR BLD: 6.5 % (ref 4.8–5.6)
HCT VFR BLD AUTO: 45 % (ref 34–46.6)
HDLC SERPL-MCNC: 38 MG/DL
HGB BLD-MCNC: 14.6 G/DL (ref 11.1–15.9)
IMM GRANULOCYTES # BLD AUTO: 0 X10E3/UL (ref 0–0.1)
IMM GRANULOCYTES NFR BLD AUTO: 0 %
IMMATURE CELLS  115398: NORMAL
LABORATORY COMMENT REPORT: ABNORMAL
LDLC SERPL CALC-MCNC: 46 MG/DL (ref 0–99)
LYMPHOCYTES # BLD AUTO: 2.5 X10E3/UL (ref 0.7–3.1)
LYMPHOCYTES NFR BLD AUTO: 36 %
MCH RBC QN AUTO: 29.7 PG (ref 26.6–33)
MCHC RBC AUTO-ENTMCNC: 32.4 G/DL (ref 31.5–35.7)
MCV RBC AUTO: 92 FL (ref 79–97)
MONOCYTES # BLD AUTO: 0.4 X10E3/UL (ref 0.1–0.9)
MONOCYTES NFR BLD AUTO: 6 %
MORPHOLOGY BLD-IMP: NORMAL
NEUTROPHILS # BLD AUTO: 3.9 X10E3/UL (ref 1.4–7)
NEUTROPHILS NFR BLD AUTO: 57 %
NRBC BLD AUTO-RTO: NORMAL %
PLATELET # BLD AUTO: 202 X10E3/UL (ref 150–379)
POTASSIUM SERPL-SCNC: 4.4 MMOL/L (ref 3.5–5.2)
PROT SERPL-MCNC: 8.3 G/DL (ref 6–8.5)
PROT UR-MCNC: 23.6 MG/DL
PROT/CREAT UR: 209 MG/G CREAT (ref 0–200)
RBC # BLD AUTO: 4.92 X10E6/UL (ref 3.77–5.28)
SODIUM SERPL-SCNC: 139 MMOL/L (ref 134–144)
T3RU NFR SERPL: 26 % (ref 24–39)
T4 SERPL-MCNC: 10 UG/DL (ref 4.5–12)
TRIGL SERPL-MCNC: 99 MG/DL (ref 0–149)
TSH SERPL DL<=0.005 MIU/L-ACNC: 2.27 UIU/ML (ref 0.45–4.5)
VLDLC SERPL CALC-MCNC: 20 MG/DL (ref 5–40)
WBC # BLD AUTO: 7 X10E3/UL (ref 3.4–10.8)

## 2019-05-02 ENCOUNTER — TELEMEDICINE ORIGINATING SITE VISIT (OUTPATIENT)
Dept: MEDICAL GROUP | Facility: CLINIC | Age: 59
End: 2019-05-02
Payer: COMMERCIAL

## 2019-05-02 ENCOUNTER — TELEMEDICINE2 (OUTPATIENT)
Dept: MEDICAL GROUP | Age: 59
End: 2019-05-02
Payer: COMMERCIAL

## 2019-05-02 VITALS
HEART RATE: 69 BPM | HEIGHT: 69 IN | SYSTOLIC BLOOD PRESSURE: 112 MMHG | BODY MASS INDEX: 36.73 KG/M2 | TEMPERATURE: 98.2 F | DIASTOLIC BLOOD PRESSURE: 74 MMHG | OXYGEN SATURATION: 94 % | WEIGHT: 248 LBS

## 2019-05-02 DIAGNOSIS — E78.5 DYSLIPIDEMIA: ICD-10-CM

## 2019-05-02 DIAGNOSIS — E66.09 CLASS 2 OBESITY DUE TO EXCESS CALORIES WITHOUT SERIOUS COMORBIDITY WITH BODY MASS INDEX (BMI) OF 36.0 TO 36.9 IN ADULT: ICD-10-CM

## 2019-05-02 DIAGNOSIS — E11.9 TYPE 2 DIABETES MELLITUS WITHOUT COMPLICATION, WITHOUT LONG-TERM CURRENT USE OF INSULIN (HCC): ICD-10-CM

## 2019-05-02 PROCEDURE — 99214 OFFICE O/P EST MOD 30 MIN: CPT | Performed by: INTERNAL MEDICINE

## 2019-05-03 NOTE — PROGRESS NOTES
CC: Follow-up lab work    Verified identification with patient. Secured video conference with RN presenter in [facility]      HPI:   Gisel presents today with the following.    1. Type 2 diabetes mellitus without complication, without long-term current use of insulin ([facility])  Patient taking metformin 500 mg daily.  Fasting blood sugar 129.  Blood sugars at home range 100-1 60 range.  Patient started walking and keeping a food diary.  Hemoglobin A1c 6.5, down from 6.9  Total cholesterol 104, triglyceride 99, HDL 38      2. Class 2 obesity due to excess calories without serious comorbidity with body mass index (BMI) of 36.0 to 36.9 in adult  Patient's current weight is 248 pounds.  Patient just recently started to walk daily, trying to get 2 miles in per day.  She is also keeping a food diary and is trying to avoid carbohydrates and controlling her portions.  TSH is 2.2      Patient Active Problem List    Diagnosis Date Noted   • Acute cystitis without hematuria 03/18/2019   • Class 2 obesity due to excess calories without serious comorbidity in adult 09/10/2018   • Type 2 diabetes mellitus without complication ([facility]) 08/28/2017   • Palpitations 05/05/2017   • Vitamin D deficiency 02/27/2017   • Coronary artery disease, non-occlusive 12/16/2016   • Dyslipidemia 12/16/2016   • Hyperglycemia 10/31/2016   • Acute bronchitis due to infection 10/03/2016   • Hypothyroidism 06/06/2016   • SVT (supraventricular tachycardia) ([facility]) 10/10/2014   • Sleep disorder 06/20/2014   • PAF (paroxysmal atrial fibrillation) ([facility]) 12/13/2013   • Obese 12/13/2013       Current Outpatient Prescriptions   Medication Sig Dispense Refill   • phenazopyridine (PYRIDIUM) 200 MG Tab Take 1 Tab by mouth 3 times a day as needed. 6 Tab 0   • metFORMIN (GLUCOPHAGE) 500 MG Tab Take 1 Tab by mouth every day. 30 Tab 5   • levothyroxine (SYNTHROID) 112 MCG Tab Take 1 Tab by mouth every morning before breakfast. 30 Tab 11   • atorvastatin (LIPITOR) 10 MG Tab  "Take 1 Tab by mouth every evening. 90 Tab 3   • metoprolol (LOPRESSOR) 25 MG Tab Take 1 Tab by mouth 2 times a day. 180 Tab 3   • Albuterol Sulfate (PROAIR HFA INH) Inhale  by mouth.     • ciprofloxacin (CIPRO) 250 MG Tab Take 1 Tab by mouth 2 times a day. 6 Tab 0   • CINNAMON PO Take 2 Capsule by mouth every day.     • EPIPEN 2-YVROSE 0.3 MG/0.3ML Solution Auto-injector solution for injection      • AFLURIA Suspension injection      • vitamin D (CHOLECALCIFEROL) 1000 UNIT TABS Take 2,000 Units by mouth every day.     • aspirin (ASA) 81 MG CHEW Take 1 Tab by mouth every day. 100 Tab      No current facility-administered medications for this visit.          Allergies as of 05/02/2019 - Reviewed 05/02/2019   Allergen Reaction Noted   • Other food  05/04/2018   • Doxycycline  10/10/2013        ROS: As per HPI.  Patient denies all cardiopulmonary, GI, neurologic symptoms.    /74 (BP Location: Right arm, Patient Position: Sitting)   Pulse 69   Temp 36.8 °C (98.2 °F) (Temporal)   Ht 1.753 m (5' 9\")   Wt 112.5 kg (248 lb)   SpO2 94%   BMI 36.62 kg/m²     Physical Exam:  Gen:         Alert and oriented, No apparent distress.  Neck:        No Lymphadenopathy or Bruits.  Lungs:     Clear to auscultation bilaterally, no wheezes rhonchi or crackles  CV:          Regular rate and rhythm. No murmurs, rubs or gallops.  Abd:         Soft non tender, non distended. Normal active bowel sounds.                    Ext:          No clubbing, cyanosis, edema.  Neuro:     Grossly intact    Assessment and Plan.   58 y.o. female with the following issues.    1. Type 2 diabetes mellitus without complication, without long-term current use of insulin (HCC)  Continue metformin 500 mg daily  Labs all up-to-date  Counseled patient on diabetic diet, food sources and resources  Counseled patient on the role of exercise and weight loss and diabetes management  Retinal exam on next visit    2. Class 2 obesity due to excess calories without " serious comorbidity with body mass index (BMI) of 36.0 to 36.9 in adult  Patient's weight loss goal is 230 pounds  Counseled patient on diet and lifestyle management  RTC 3 to 4 months    Total visit time, 25 minutes, greater than 50% of time spent in counseling on diet and lifestyle modifications with lab review.            Please note that this dictation was created using voice recognition software. I have made every reasonable attempt to correct obvious errors, but expect that there are errors of grammar and possible content that I did not discover before finalizing note.

## 2019-08-09 ENCOUNTER — OFFICE VISIT (OUTPATIENT)
Dept: CARDIOLOGY | Facility: MEDICAL CENTER | Age: 59
End: 2019-08-09
Payer: COMMERCIAL

## 2019-08-09 VITALS
WEIGHT: 252 LBS | HEART RATE: 60 BPM | SYSTOLIC BLOOD PRESSURE: 100 MMHG | HEIGHT: 69 IN | OXYGEN SATURATION: 94 % | BODY MASS INDEX: 37.33 KG/M2 | DIASTOLIC BLOOD PRESSURE: 64 MMHG

## 2019-08-09 DIAGNOSIS — I25.2 HISTORY OF NON-ST ELEVATION MYOCARDIAL INFARCTION (NSTEMI): ICD-10-CM

## 2019-08-09 DIAGNOSIS — E78.5 DYSLIPIDEMIA: ICD-10-CM

## 2019-08-09 DIAGNOSIS — I25.10 CORONARY ARTERY DISEASE, NON-OCCLUSIVE: ICD-10-CM

## 2019-08-09 DIAGNOSIS — I48.0 PAF (PAROXYSMAL ATRIAL FIBRILLATION) (HCC): ICD-10-CM

## 2019-08-09 PROCEDURE — 99214 OFFICE O/P EST MOD 30 MIN: CPT | Performed by: INTERNAL MEDICINE

## 2019-08-09 ASSESSMENT — ENCOUNTER SYMPTOMS
MYALGIAS: 0
SHORTNESS OF BREATH: 0
PALPITATIONS: 0
LOSS OF CONSCIOUSNESS: 0
COUGH: 0
DIZZINESS: 0

## 2019-08-09 NOTE — PROGRESS NOTES
Chief Complaint   Patient presents with   • Coronary Artery Disease   • Atrial Fibrillation   • Hyperlipidemia   • Palpitations   • Supraventricular Tachycardia (SVT)       Subjective:   Gisel Montez is a 58 y.o. female who presents today for follow up evaluation for CAD, previous inferior myocardial infarction, PAF, SVT, palpitations and hyperlipidemia.     Last seen on 5/4/2018.    Since 5/4/2018 appointment the patient said no cardiac problems or symptoms.  Compliant with medications.  No palpitations.    Since 5/5/2017 the patient has had no cardiac symptoms.  No angina pectoris or palpitations.  Recent lab shows increasing blood sugar and glycohemoglobin.  Lipid panel stable.     Since 12/16/2016 the patient has had no cardiac symptoms. No angina pectoris or palpitations.  Recent lipid panel was normal.     Since 7/21/2016 appointment the patient's had no cardiac symptoms.  Tolerating and compliant with medications.  Follow-up with PCP Dr. Arambula who placed the patient on a diet.  Patient has lost 10 pounds.     Has new PCP with [facility] Dr Lou Arambula     Since last appointment in 7/2015 the patient stopped cigarette 7 months ago.  She didn't along with a friend.  She is also altered her dietary habits.  No new cardiac symptoms.  Still doesn't have a PCP in [place], [place]     Past medical history  Saw Dr.Steve Barton, hematologist for elevated serum globulins.  Being monitored.  Stopped Plavix in May 2015.     On 10/21/2014 seen at [facility].  Chest pain. Normal troponin. Fixed defect on MPI.  Discharged on medications.     On 10/14/2013 discharged from [facility] after an STEMI inferior MI.  Received TNK in [place], [place] and transferred to [facility].  Cardiac catheterization on 10/10/2013 showed mild CAD and inferior wall hypokinesis. EF was 65%..  Subsequently developed atrial fibrillation requiring electrical cardioversion on 10/13/2013.     Past Medical History:   Diagnosis Date   • ACTIVE SMOKER       4-10 sticks/40   • Acute bronchitis due to infection 10/3/2016   • Acute MI, inferior wall ([facility])     • CAD (coronary artery disease)     • Cardiomyopathy ([facility])    • Class 2 obesity due to excess calories without serious comorbidity in adult 9/10/2018   • Hyperglycemia 10/31/2016   • Hypothyroidism     • PAF (paroxysmal atrial fibrillation) ([facility])     • Palpitations     • Type 2 diabetes mellitus without complication ([facility]) 2017   • Vitamin D deficiency 2017     Past Surgical History:   Procedure Laterality Date   • GYN SURGERY      tubal ligation   • OTHER      choleycystectomy     Family History   Problem Relation Age of Onset   • Diabetes Father 68     Social History     Socioeconomic History   • Marital status:      Spouse name: Not on file   • Number of children: Not on file   • Years of education: Not on file   • Highest education level: Not on file   Occupational History   • Occupation: other     Comment: [employer]   Social Needs   • Financial resource strain: Not on file   • Food insecurity:     Worry: Not on file     Inability: Not on file   • Transportation needs:     Medical: Not on file     Non-medical: Not on file   Tobacco Use   • Smoking status: Former Smoker     Packs/day: 0.50     Years: 35.00     Pack years: 17.50     Types: Cigarettes     Last attempt to quit: 2015     Years since quittin.2   • Smokeless tobacco: Never Used   Substance and Sexual Activity   • Alcohol use: Yes     Alcohol/week: 3.6 oz     Types: 2 Glasses of wine, 4 Cans of beer per week     Comment: one/week   • Drug use: No   • Sexual activity: Yes     Partners: Male   Lifestyle   • Physical activity:     Days per week: Not on file     Minutes per session: Not on file   • Stress: Not on file   Relationships   • Social connections:     Talks on phone: Not on file     Gets together: Not on file     Attends Anglican service: Not on file     Active member of club or organization: Not on file     Attends  "meetings of clubs or organizations: Not on file     Relationship status: Not on file   • Intimate partner violence:     Fear of current or ex partner: Not on file     Emotionally abused: Not on file     Physically abused: Not on file     Forced sexual activity: Not on file   Other Topics Concern   • Not on file   Social History Narrative    . Lives in [place], [place]. Works for [employer].     Allergies   Allergen Reactions   • Other Food      \"CELERY\". Became violently ill, tongue swelled and hard to breath.   • Doxycycline      Outpatient Encounter Medications as of 8/9/2019   Medication Sig Dispense Refill   • Albuterol Sulfate (PROAIR HFA INH) Inhale  by mouth.     • metFORMIN (GLUCOPHAGE) 500 MG Tab Take 1 Tab by mouth every day. 30 Tab 5   • levothyroxine (SYNTHROID) 112 MCG Tab Take 1 Tab by mouth every morning before breakfast. 30 Tab 11   • atorvastatin (LIPITOR) 10 MG Tab Take 1 Tab by mouth every evening. 90 Tab 3   • metoprolol (LOPRESSOR) 25 MG Tab Take 1 Tab by mouth 2 times a day. 180 Tab 3   • CINNAMON PO Take 2 Capsule by mouth every day.     • EPIPEN 2-YVROSE 0.3 MG/0.3ML Solution Auto-injector solution for injection      • vitamin D (CHOLECALCIFEROL) 1000 UNIT TABS Take 2,000 Units by mouth every day.     • aspirin (ASA) 81 MG CHEW Take 1 Tab by mouth every day. 100 Tab    • [DISCONTINUED] phenazopyridine (PYRIDIUM) 200 MG Tab Take 1 Tab by mouth 3 times a day as needed. (Patient not taking: Reported on 8/9/2019) 6 Tab 0   • [DISCONTINUED] ciprofloxacin (CIPRO) 250 MG Tab Take 1 Tab by mouth 2 times a day. (Patient not taking: Reported on 8/9/2019) 6 Tab 0   • [DISCONTINUED] AFLURIA Suspension injection        No facility-administered encounter medications on file as of 8/9/2019.      Review of Systems   Respiratory: Negative for cough and shortness of breath.    Cardiovascular: Negative for chest pain and palpitations.   Musculoskeletal: Negative for myalgias.   Neurological: Negative for " "dizziness and loss of consciousness.        Objective:   /64 (BP Location: Left leg, Patient Position: Sitting, BP Cuff Size: Adult)   Pulse 60   Ht 1.753 m (5' 9\")   Wt 114.3 kg (252 lb)   SpO2 94%   BMI 37.21 kg/m²     Physical Exam   Constitutional: She is oriented to person, place, and time. She appears well-developed and well-nourished. No distress.   Neck: No JVD present.   Cardiovascular: Normal rate, regular rhythm, normal heart sounds and intact distal pulses. Exam reveals no gallop and no friction rub.   No murmur heard.  Pulmonary/Chest: Effort normal and breath sounds normal. No respiratory distress. She has no wheezes. She has no rales.   Musculoskeletal: She exhibits no edema.   Neurological: She is alert and oriented to person, place, and time.   Skin: Skin is warm and dry.   Psychiatric: She has a normal mood and affect. Her behavior is normal.     DATE OF PROCEDURE:  10/10/2013  PROCEDURES:  1.  Left heart catheterization.  2.  Coronary angiography.  3.  Left ventriculogram.   PREPROCEDURE DIAGNOSIS:    1. Possible atypical Takotsubo syndrome.  2. Non obstructive coronary artery disease.  POSTPROCEDURE DIAGNOSIS:  Possible atypical Takotsubo syndrome.    10/12/2013 Echocardiogram  Left ventricular ejection fraction is 55% to 60%.  Mild concentric left ventricular hypertrophy.  Mildly dilated left atrium.  Aortic sclerosis without stenosis.  Right ventricular systolic pressure is estimated to be 25 mmhg.    10/10/2013 Chest CTA  1. No pulmonary embolism  2. Mild atelectasis  3. Previous cholecystectomy  4. Atherosclerosis     2014 Cardiac event recorder: [employer]. 4 beasts SVT. 4 beats VT.     Assessment:     1. PAF (paroxysmal atrial fibrillation) (HCC)     2. History of non-ST elevation myocardial infarction (NSTEMI)     3. Coronary artery disease, non-occlusive     4. Dyslipidemia         Medical Decision Making:  Today's Assessment / Status / Plan:     Assessment  1. MI. 10/10/2013. " Unclear mechanism. ? Cardiac embolic due to PAF.       2. PAF. Continue current therapy. On aspirin. Now off plavix.     3. Palpitations. Resolved.      4. Hyperlipidemia.  On atorvastatin.     5. SVT. No clinical recurrence.     6. VT. Continue beta blocker therapy.     7. Sleep disorder.       8. Elevated LFTs in 10/2014 now resolved.     9. Elevated globulin. Per Dr Barton.       10. Obesity.     11. History of tobacco use.    12. Glucose intolerance.     Recommendations  1. Medically the patient is stable from a cardiac standpoint.  2.  Lipid profile reviewed 4/25/2019 normal.  3.  Continue current medical and cardiac therapy.  4.  Follow-up 12 months.

## 2019-09-05 DIAGNOSIS — E78.5 HYPERLIPIDEMIA, UNSPECIFIED HYPERLIPIDEMIA TYPE: ICD-10-CM

## 2019-09-05 DIAGNOSIS — I10 ESSENTIAL HYPERTENSION: ICD-10-CM

## 2019-09-11 RX ORDER — ATORVASTATIN CALCIUM 10 MG/1
10 TABLET, FILM COATED ORAL EVERY EVENING
Qty: 90 TAB | Refills: 3 | Status: SHIPPED | OUTPATIENT
Start: 2019-09-11 | End: 2021-04-22

## 2019-09-19 DIAGNOSIS — I10 ESSENTIAL HYPERTENSION: ICD-10-CM

## 2019-11-14 ENCOUNTER — TELEMEDICINE ORIGINATING SITE VISIT (OUTPATIENT)
Dept: MEDICAL GROUP | Facility: CLINIC | Age: 59
End: 2019-11-14
Payer: COMMERCIAL

## 2019-11-14 ENCOUNTER — NON-PROVIDER VISIT (OUTPATIENT)
Dept: MEDICAL GROUP | Facility: CLINIC | Age: 59
End: 2019-11-14
Payer: COMMERCIAL

## 2019-11-14 ENCOUNTER — TELEMEDICINE2 (OUTPATIENT)
Dept: MEDICAL GROUP | Age: 59
End: 2019-11-14
Payer: COMMERCIAL

## 2019-11-14 VITALS
SYSTOLIC BLOOD PRESSURE: 124 MMHG | RESPIRATION RATE: 16 BRPM | WEIGHT: 251 LBS | DIASTOLIC BLOOD PRESSURE: 73 MMHG | HEIGHT: 69 IN | TEMPERATURE: 97.6 F | OXYGEN SATURATION: 93 % | HEART RATE: 63 BPM | BODY MASS INDEX: 37.18 KG/M2

## 2019-11-14 DIAGNOSIS — E11.9 TYPE 2 DIABETES MELLITUS WITHOUT COMPLICATION, WITHOUT LONG-TERM CURRENT USE OF INSULIN (HCC): ICD-10-CM

## 2019-11-14 DIAGNOSIS — R73.9 HYPERGLYCEMIA: ICD-10-CM

## 2019-11-14 DIAGNOSIS — I25.10 CORONARY ARTERY DISEASE, NON-OCCLUSIVE: ICD-10-CM

## 2019-11-14 DIAGNOSIS — E66.09 CLASS 2 OBESITY DUE TO EXCESS CALORIES WITHOUT SERIOUS COMORBIDITY IN ADULT, UNSPECIFIED BMI: ICD-10-CM

## 2019-11-14 LAB
HBA1C MFR BLD: 6 % (ref 0–5.6)
INT CON NEG: NEGATIVE
INT CON POS: POSITIVE

## 2019-11-14 PROCEDURE — 83036 HEMOGLOBIN GLYCOSYLATED A1C: CPT | Performed by: INTERNAL MEDICINE

## 2019-11-14 PROCEDURE — 99214 OFFICE O/P EST MOD 30 MIN: CPT | Performed by: INTERNAL MEDICINE

## 2019-11-14 NOTE — NON-PROVIDER
Gisel Montez is a 59 y.o. female here for a non-provider visit for A1C    If abnormal was an in office provider notified today (if so, indicate provider)? Yes  Routed to PCP? Yes

## 2019-11-14 NOTE — PROGRESS NOTES
CC: Follow-up diabetes    Verified identification with patient. Secured video conference with RN presenter in [facility]      HPI:   Gisel presents today with the following.    1. Coronary artery disease, non-occlusive  Patient with PMH of paroxysmal atrial fibrillation history, status post acute MI in 2013.  Patient was seen by cardiology in August.  Plan is to continue current medications.  Patient is taking aspirin, atorvastatin and is off of her Plavix.  Patient denies chest pain, shortness of breath, palpitations, lower extremity edema, dizziness or fatigue.    2. Class 2 obesity due to excess calories without serious comorbidity in adult, unspecified BMI  Patient's current weight is 251 pounds, BMI of 37.5.  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  She is signed up for a yoga class which will begin in the near future.    3. Type 2 diabetes mellitus without complication, without long-term current use of insulin (HCC)  Patient currently treated with metformin 500 mg p.o. daily.  Patient is concerned about occasional high blood sugars in the morning.  Due to her recent increase in traveling, patient has been having dinners between 830 and 9 PM.  She otherwise tries to follow a diabetic diet as close as possible.  Hemoglobin A1c 6.0, down from 6.5.      Patient Active Problem List    Diagnosis Date Noted   • History of non-ST elevation myocardial infarction (NSTEMI) 08/09/2019   • Acute cystitis without hematuria 03/18/2019   • Class 2 obesity due to excess calories without serious comorbidity in adult 09/10/2018   • Type 2 diabetes mellitus without complication (HCC) 08/28/2017   • Palpitations 05/05/2017   • Vitamin D deficiency 02/27/2017   • Coronary artery disease, non-occlusive 12/16/2016   • Dyslipidemia 12/16/2016   • Hyperglycemia 10/31/2016   • Acute bronchitis due to infection 10/03/2016   • Hypothyroidism 06/06/2016   • SVT  "(supraventricular tachycardia) ([facility]) 10/10/2014   • Sleep disorder 06/20/2014   • PAF (paroxysmal atrial fibrillation) ([facility]) 12/13/2013   • Obese 12/13/2013       Current Outpatient Medications   Medication Sig Dispense Refill   • metFORMIN (GLUCOPHAGE) 500 MG Tab TAKE ONE TABLET BY MOUTH EVERY DAY 30 Tab 5   • metoprolol (LOPRESSOR) 25 MG Tab Take 1 Tab by mouth 2 times a day. 180 Tab 3   • atorvastatin (LIPITOR) 10 MG Tab Take 1 Tab by mouth every evening. 90 Tab 3   • Albuterol Sulfate (PROAIR HFA INH) Inhale  by mouth.     • levothyroxine (SYNTHROID) 112 MCG Tab Take 1 Tab by mouth every morning before breakfast. 30 Tab 11   • CINNAMON PO Take 2 Capsule by mouth every day.     • EPIPEN 2-YVROSE 0.3 MG/0.3ML Solution Auto-injector solution for injection      • vitamin D (CHOLECALCIFEROL) 1000 UNIT TABS Take 2,000 Units by mouth every day.     • aspirin (ASA) 81 MG CHEW Take 1 Tab by mouth every day. 100 Tab      No current facility-administered medications for this visit.          Allergies as of 11/14/2019 - Reviewed 11/14/2019   Allergen Reaction Noted   • Other food  05/04/2018   • Doxycycline  10/10/2013        ROS: As per HPI.  Denies all cardiopulmonary, GI, neurologic symptoms.    /73 (BP Location: Right arm, Patient Position: Sitting)   Pulse 63   Temp 36.4 °C (97.6 °F)   Resp 16   Ht 1.753 m (5' 9\")   Wt 113.9 kg (251 lb)   SpO2 93%   BMI 37.07 kg/m²     Physical Exam:  Gen:         Alert and oriented, No apparent distress.  Neck:        No Lymphadenopathy or Bruits.  No thyromegaly.  Neck is supple  Lungs:     Clear to auscultation bilaterally, no wheezes rhonchi or crackles  CV:          Regular rate and rhythm. No murmurs, rubs or gallops.  S1-S2 heard.  Abd:         Soft non tender, non distended. Normal active bowel sounds.  No  Hepatosplenomegaly, No pulsatile masses.                   Ext:          No clubbing, cyanosis, edema.      Assessment and Plan.   59 y.o. female with the " following issues.    1. Coronary artery disease, non-occlusive  Stable, continue current plan of care  12-month follow-up visit with cardiology    2. Class 2 obesity due to excess calories without serious comorbidity in adult, unspecified BMI  Current BMI of 37.5.  Counseled patient on diet and lifestyle modifications to include aerobic activity 30 to 40 minutes 3-4 times weekly.  Portion control.  Low carbohydrate diet.  Patient to start yoga which will help with stress management.  Weight loss goal of 10 to 15 pounds in 3 months.    3. Type 2 diabetes mellitus without complication, without long-term current use of insulin (HCC)  Continue metformin 500 mg daily  Monitor blood sugars  Counseled patient on diet and lifestyle modifications, weight loss benefits  Up-to-date with lab work, completed in April 2019            Please note that this dictation was created using voice recognition software. I have made every reasonable attempt to correct obvious errors, but expect that there are errors of grammar and possible content that I did not discover before finalizing note.

## 2019-12-24 DIAGNOSIS — E03.9 HYPOTHYROIDISM, UNSPECIFIED TYPE: ICD-10-CM

## 2019-12-24 RX ORDER — LEVOTHYROXINE SODIUM 112 UG/1
112 TABLET ORAL
Qty: 30 TAB | Refills: 2 | Status: SHIPPED | OUTPATIENT
Start: 2019-12-24

## 2020-02-10 ENCOUNTER — OFFICE VISIT (OUTPATIENT)
Dept: MEDICAL GROUP | Facility: CLINIC | Age: 60
End: 2020-02-10
Payer: COMMERCIAL

## 2020-02-10 VITALS
TEMPERATURE: 98.2 F | OXYGEN SATURATION: 96 % | WEIGHT: 247 LBS | RESPIRATION RATE: 16 BRPM | SYSTOLIC BLOOD PRESSURE: 122 MMHG | HEART RATE: 71 BPM | HEIGHT: 69 IN | DIASTOLIC BLOOD PRESSURE: 79 MMHG | BODY MASS INDEX: 36.58 KG/M2

## 2020-02-10 DIAGNOSIS — H10.9 BACTERIAL CONJUNCTIVITIS: ICD-10-CM

## 2020-02-10 PROBLEM — N30.00 ACUTE CYSTITIS WITHOUT HEMATURIA: Status: RESOLVED | Noted: 2019-03-18 | Resolved: 2020-02-10

## 2020-02-10 PROCEDURE — 99213 OFFICE O/P EST LOW 20 MIN: CPT | Performed by: PHYSICIAN ASSISTANT

## 2020-02-10 RX ORDER — TOBRAMYCIN 3 MG/ML
2 SOLUTION/ DROPS OPHTHALMIC EVERY 4 HOURS
Qty: 1 BOTTLE | Refills: 0 | Status: SHIPPED | OUTPATIENT
Start: 2020-02-10 | End: 2020-02-20 | Stop reason: SDUPTHER

## 2020-02-10 NOTE — PROGRESS NOTES
cc:  Eye problem    Subjective:     Gisel Montez is a 59 y.o. female presenting for eye problem      Patient presents to the office for eye problem.  Patient states that she has been having a dry eye and has been trying eye drops.  She states that in the morning her eyes are glued shut.  Her right is worse than her left.  It has been about 10 days.  She states she has been told that pink eye has been going around.  She works with [employer].  She denies pain in the eye.  She states that they have been more itchy.  She denies fever and chills.  She is allergic to doxycycline.tobr    Review of systems:  See above.   Denies any symptoms unless previously indicated.        Current Outpatient Medications:   •  tobramycin (TOBREX) 0.3 % Solution ophthalmic solution, Place 2 Drops in both eyes every 4 hours., Disp: 1 Bottle, Rfl: 0  •  levothyroxine (SYNTHROID) 112 MCG Tab, Take 1 Tab by mouth every morning before breakfast., Disp: 30 Tab, Rfl: 2  •  metFORMIN (GLUCOPHAGE) 500 MG Tab, TAKE ONE TABLET BY MOUTH EVERY DAY, Disp: 30 Tab, Rfl: 5  •  metoprolol (LOPRESSOR) 25 MG Tab, Take 1 Tab by mouth 2 times a day., Disp: 180 Tab, Rfl: 3  •  atorvastatin (LIPITOR) 10 MG Tab, Take 1 Tab by mouth every evening., Disp: 90 Tab, Rfl: 3  •  Albuterol Sulfate (PROAIR HFA INH), Inhale  by mouth., Disp: , Rfl:   •  CINNAMON PO, Take 2 Capsule by mouth every day., Disp: , Rfl:   •  EPIPEN 2-YVROSE 0.3 MG/0.3ML Solution Auto-injector solution for injection, , Disp: , Rfl:   •  vitamin D (CHOLECALCIFEROL) 1000 UNIT TABS, Take 2,000 Units by mouth every day., Disp: , Rfl:   •  aspirin (ASA) 81 MG CHEW, Take 1 Tab by mouth every day., Disp: 100 Tab, Rfl:     Allergies, past medical history, past surgical history, family history, social history reviewed and updated    Objective:     Vitals: /79 (BP Location: Left arm, Patient Position: Sitting, BP Cuff Size: Adult)   Pulse 71   Temp 36.8 °C (98.2 °F) (Temporal)   Resp 16   Ht 1.753 m  "(5' 9\")   Wt 112 kg (247 lb)   SpO2 96%   BMI 36.48 kg/m²   General: Alert, pleasant, NAD  EYES:   PERRL, EOMI, no icterus or pallor.  Conjunctivae is erythematous bilaterally and lids normal. Worse in right eye vs. left  HENT:  Normocephalic.  External ears normal.   No nasal drainage present.   Neck supple.     Abdomen: obese  Skin: Warm, dry, no rashes.  Musculoskeletal: Gait is normal.  Moves all extremities well.    Neurological: No tremors, sensation grossly intact,  CN2-12 intact.  Psych:  Affect/mood is normal, judgement is good, memory is intact, grooming is appropriate.    Assessment/Plan:     Gisel was seen today for conjunctivitis.    Diagnoses and all orders for this visit:    Bacterial conjunctivitis  -     tobramycin (TOBREX) 0.3 % Solution ophthalmic solution; Place 2 Drops in both eyes every 4 hours.    Tobramycin as indicated.  No significant improvement in the next 3 to 4 days, consider allergic component.  Note provided to remain off of work next 2 days.  ER precautions given.        Return if symptoms worsen or fail to improve.    Please note that this dictation was created using voice recognition software. I have made every reasonable attempt to correct obvious errors, but expect that there are errors of grammar and possible content that I did not discover before finalizing note.   "

## 2020-02-10 NOTE — LETTER
February 10, 2020       Patient: Gisel Montez   YOB: 1960   Date of Visit: 2/10/2020         To Whom It May Concern:    It is my medical opinion that Gisel Montez return to work on 2-..    If you have any questions or concerns, please don't hesitate to call [phone]          Sincerely,          Olga Aguilar P.A.-C.  Electronically Signed

## 2020-02-20 DIAGNOSIS — H10.9 BACTERIAL CONJUNCTIVITIS: ICD-10-CM

## 2020-02-20 RX ORDER — TOBRAMYCIN 3 MG/ML
2 SOLUTION/ DROPS OPHTHALMIC EVERY 4 HOURS
Qty: 1 BOTTLE | Refills: 0 | Status: SHIPPED | OUTPATIENT
Start: 2020-02-20 | End: 2021-04-22

## 2020-02-20 NOTE — TELEPHONE ENCOUNTER
Pt states she is still having some yellow drainage from eyes with itching.  Denies blurry vision or eye redness.  Pt would like a refill on the eye antibiotic.    Requested Prescriptions     Pending Prescriptions Disp Refills   • tobramycin (TOBREX) 0.3 % Solution ophthalmic solution 1 Bottle 0     Sig: Place 2 Drops in both eyes every 4 hours.

## 2020-02-20 NOTE — TELEPHONE ENCOUNTER
Symptoms were almost gone and she ran out of the medication.  Symptoms are now getting worse again.

## 2020-04-29 ENCOUNTER — TELEPHONE (OUTPATIENT)
Dept: MEDICAL GROUP | Facility: PHYSICIAN GROUP | Age: 60
End: 2020-04-29

## 2020-04-29 NOTE — TELEPHONE ENCOUNTER
If patient wishes to stay with [facility], please have patient make a follow-up virtual visit at this time due to COVID 19.

## 2020-08-20 ENCOUNTER — OFFICE VISIT (OUTPATIENT)
Dept: CARDIOLOGY | Facility: MEDICAL CENTER | Age: 60
End: 2020-08-20
Payer: COMMERCIAL

## 2020-08-20 VITALS
BODY MASS INDEX: 37.98 KG/M2 | HEIGHT: 69 IN | SYSTOLIC BLOOD PRESSURE: 120 MMHG | DIASTOLIC BLOOD PRESSURE: 72 MMHG | OXYGEN SATURATION: 94 % | HEART RATE: 80 BPM | WEIGHT: 256.4 LBS

## 2020-08-20 DIAGNOSIS — R00.2 PALPITATIONS: ICD-10-CM

## 2020-08-20 DIAGNOSIS — I25.10 CORONARY ARTERY DISEASE, NON-OCCLUSIVE: ICD-10-CM

## 2020-08-20 DIAGNOSIS — I47.10 SVT (SUPRAVENTRICULAR TACHYCARDIA) (HCC): ICD-10-CM

## 2020-08-20 DIAGNOSIS — I25.2 HISTORY OF NON-ST ELEVATION MYOCARDIAL INFARCTION (NSTEMI): ICD-10-CM

## 2020-08-20 DIAGNOSIS — E78.5 DYSLIPIDEMIA: ICD-10-CM

## 2020-08-20 DIAGNOSIS — Z86.39 HISTORY OF DIABETES MELLITUS: ICD-10-CM

## 2020-08-20 PROCEDURE — 99214 OFFICE O/P EST MOD 30 MIN: CPT | Performed by: INTERNAL MEDICINE

## 2020-08-20 ASSESSMENT — ENCOUNTER SYMPTOMS
LOSS OF CONSCIOUSNESS: 0
MYALGIAS: 0
SHORTNESS OF BREATH: 0
DIZZINESS: 0
COUGH: 0
PALPITATIONS: 0

## 2020-08-20 ASSESSMENT — FIBROSIS 4 INDEX: FIB4 SCORE: 1.56

## 2020-08-20 NOTE — PROGRESS NOTES
Chief Complaint   Patient presents with   • Follow-Up     Paroxysmal Atrial Fibrillation       Subjective:   Gisel Montez is a 59 y.o. female who presents today for follow up evaluation for CAD, previous inferior myocardial infarction, PAF, SVT, palpitations and hyperlipidemia.     Last seen on 8/9/2019    Since 8/9/2018 overall the patient is been doing well though she does report 2-3 times a month of nonexertional palpitations lasting up to 15 to 20 minutes.  No lightheadedness or syncope.  Otherwise remains active.  Her mother passed away at age 82 from multiple myeloma on Mother's Day this year resulting in a fair amount of stress with chest discomfort.  No longer has a clinic affiliated with [facility] in [place] but there is another company that is [facility] medicine for which she is not very satisfied.    Since 5/4/2018 appointment the patient said no cardiac problems or symptoms.  Compliant with medications.  No palpitations.    Since 5/5/2017 the patient has had no cardiac symptoms.  No angina pectoris or palpitations.  Recent lab shows increasing blood sugar and glycohemoglobin.  Lipid panel stable.     Since 12/16/2016 the patient has had no cardiac symptoms. No angina pectoris or palpitations.  Recent lipid panel was normal.     Since 7/21/2016 appointment the patient's had no cardiac symptoms.  Tolerating and compliant with medications.  Follow-up with PCP Dr. Arambula who placed the patient on a diet.  Patient has lost 10 pounds.     Has new PCP with Girish Arambula     Since last appointment in 7/2015 the patient stopped cigarette 7 months ago.  She didn't along with a friend.  She is also altered her dietary habits.  No new cardiac symptoms.  Still doesn't have a PCP in [place], [place]     Past medical history  Saw Dr.Steve Barton, hematologist for elevated serum globulins.  Being monitored.  Stopped Plavix in May 2015.     On 10/21/2014 seen at [facility].  Chest pain. Normal troponin. Fixed defect on  MPI.  Discharged on medications.     On 10/14/2013 discharged from [facility] after an STEMI inferior MI.  Received [facility] in [place], [place] and transferred to [facility].  Cardiac catheterization on 10/10/2013 showed mild CAD and inferior wall hypokinesis. EF was 65%..  Subsequently developed atrial fibrillation requiring electrical cardioversion on 10/13/2013.     Past Medical History:   Diagnosis Date   • ACTIVE SMOKER      4-10 sticks/40   • Acute bronchitis due to infection 10/3/2016   • Acute MI, inferior wall (HCC)     • CAD (coronary artery disease)     • Cardiomyopathy ([facility])    • Class 2 obesity due to excess calories without serious comorbidity in adult 9/10/2018   • Hyperglycemia 10/31/2016   • Hypothyroidism     • PAF (paroxysmal atrial fibrillation) ([facility])     • Palpitations     • Type 2 diabetes mellitus without complication ([facility]) 2017   • Vitamin D deficiency 2017     Past Surgical History:   Procedure Laterality Date   • GYN SURGERY      tubal ligation   • OTHER      choleycystectomy     Family History   Problem Relation Age of Onset   • Diabetes Father 68     Social History     Socioeconomic History   • Marital status:      Spouse name: Not on file   • Number of children: Not on file   • Years of education: Not on file   • Highest education level: Not on file   Occupational History   • Occupation: other     Comment: [employer]   Social Needs   • Financial resource strain: Not on file   • Food insecurity     Worry: Not on file     Inability: Not on file   • Transportation needs     Medical: Not on file     Non-medical: Not on file   Tobacco Use   • Smoking status: Former Smoker     Packs/day: 0.50     Years: 35.00     Pack years: 17.50     Types: Cigarettes     Quit date: 2015     Years since quittin.2   • Smokeless tobacco: Never Used   Substance and Sexual Activity   • Alcohol use: Yes     Alcohol/week: 3.6 oz     Types: 2 Glasses of wine, 4 Cans of beer per week      "Comment: one/week   • Drug use: No   • Sexual activity: Yes     Partners: Male   Lifestyle   • Physical activity     Days per week: Not on file     Minutes per session: Not on file   • Stress: Not on file   Relationships   • Social connections     Talks on phone: Not on file     Gets together: Not on file     Attends Buddhist service: Not on file     Active member of club or organization: Not on file     Attends meetings of clubs or organizations: Not on file     Relationship status: Not on file   • Intimate partner violence     Fear of current or ex partner: Not on file     Emotionally abused: Not on file     Physically abused: Not on file     Forced sexual activity: Not on file   Other Topics Concern   • Not on file   Social History Narrative    . Lives in [place], [place]. Works for [employer].     Allergies   Allergen Reactions   • Other Food      \"CELERY\". Became violently ill, tongue swelled and hard to breath.   • Doxycycline      Outpatient Encounter Medications as of 8/20/2020   Medication Sig Dispense Refill   • tobramycin (TOBREX) 0.3 % Solution ophthalmic solution Place 2 Drops in both eyes every 4 hours. 1 Bottle 0   • levothyroxine (SYNTHROID) 112 MCG Tab Take 1 Tab by mouth every morning before breakfast. 30 Tab 2   • metFORMIN (GLUCOPHAGE) 500 MG Tab TAKE ONE TABLET BY MOUTH EVERY DAY 30 Tab 5   • metoprolol (LOPRESSOR) 25 MG Tab Take 1 Tab by mouth 2 times a day. 180 Tab 3   • atorvastatin (LIPITOR) 10 MG Tab Take 1 Tab by mouth every evening. 90 Tab 3   • Albuterol Sulfate (PROAIR HFA INH) Inhale  by mouth.     • CINNAMON PO Take 2 Capsule by mouth every day.     • EPIPEN 2-YVROSE 0.3 MG/0.3ML Solution Auto-injector solution for injection      • vitamin D (CHOLECALCIFEROL) 1000 UNIT TABS Take 2,000 Units by mouth every day.     • aspirin (ASA) 81 MG CHEW Take 1 Tab by mouth every day. 100 Tab      No facility-administered encounter medications on file as of 8/20/2020.      Review of Systems " "  Respiratory: Negative for cough and shortness of breath.    Cardiovascular: Negative for chest pain and palpitations.   Musculoskeletal: Negative for myalgias.   Neurological: Negative for dizziness and loss of consciousness.        Objective:   /72 (BP Location: Left arm, Patient Position: Sitting, BP Cuff Size: Adult)   Pulse 80   Ht 1.753 m (5' 9\")   Wt 116.3 kg (256 lb 6.4 oz)   SpO2 94%   BMI 37.86 kg/m²     Physical Exam   Constitutional: She is oriented to person, place, and time. She appears well-developed and well-nourished. No distress.   Eyes: Pupils are equal, round, and reactive to light. EOM are normal.   Neck: No JVD present.   Cardiovascular: Normal rate, regular rhythm, normal heart sounds and intact distal pulses. Exam reveals no gallop and no friction rub.   No murmur heard.  Pulmonary/Chest: Effort normal and breath sounds normal. No respiratory distress. She has no wheezes. She has no rales.   Musculoskeletal:         General: No edema.   Neurological: She is alert and oriented to person, place, and time.   Skin: Skin is warm and dry.   Psychiatric: She has a normal mood and affect. Her behavior is normal.     DATE OF PROCEDURE:  10/10/2013  PROCEDURES:  1.  Left heart catheterization.  2.  Coronary angiography.  3.  Left ventriculogram.   PREPROCEDURE DIAGNOSIS:    1. Possible atypical Takotsubo syndrome.  2. Non obstructive coronary artery disease.  POSTPROCEDURE DIAGNOSIS:  Possible atypical Takotsubo syndrome.    10/12/2013 Echocardiogram  Left ventricular ejection fraction is 55% to 60%.  Mild concentric left ventricular hypertrophy.  Mildly dilated left atrium.  Aortic sclerosis without stenosis.  Right ventricular systolic pressure is estimated to be 25 mmhg.    10/10/2013 Chest CTA  1. No pulmonary embolism  2. Mild atelectasis  3. Previous cholecystectomy  4. Atherosclerosis     2014 Cardiac event recorder: [employer]. 4 beasts SVT. 4 beats VT.     Assessment:     1. Coronary " artery disease, non-occlusive  Comp Metabolic Panel   2. SVT (supraventricular tachycardia) (HCC)  TSH+FREE T4    CBC WITHOUT DIFFERENTIAL   3. History of non-ST elevation myocardial infarction (NSTEMI)     4. Dyslipidemia  LIPID PANEL   5. Palpitations  Cardiac Event Monitor   6. History of diabetes mellitus  HEMOGLOBIN A1C (Glycohemoglobin GHB Total/A1C with MBG Estimate)       Medical Decision Making:  Today's Assessment / Status / Plan:     Assessment  1. MI. 10/10/2013. Unclear mechanism. ? Cardiac embolic due to PAF.       2. PAF. Continue current therapy. On aspirin. Now off plavix.     3. Palpitations. Resolved.      4. Hyperlipidemia.  On atorvastatin.     5. SVT. No clinical recurrence.     6. VT. Continue beta blocker therapy.     7. Sleep disorder.       8. Elevated LFTs in 10/2014 now resolved.     9. Elevated globulin. Per Dr Barton.       10. Obesity.     11. History of tobacco use.    12. Glucose intolerance.     Recommendations  1.  To evaluate the patient's palpitations will get a 3-week cardiac event recorder.  2.  Comprehensive laboratory evaluation  3.  Continue current cardiac therapy.  4.  RTC 1 year.

## 2020-09-16 ENCOUNTER — TELEPHONE (OUTPATIENT)
Dept: CARDIOLOGY | Facility: MEDICAL CENTER | Age: 60
End: 2020-09-16

## 2020-09-16 ENCOUNTER — NON-PROVIDER VISIT (OUTPATIENT)
Dept: CARDIOLOGY | Facility: MEDICAL CENTER | Age: 60
End: 2020-09-16
Payer: COMMERCIAL

## 2020-09-16 DIAGNOSIS — R00.2 PALPITATIONS: ICD-10-CM

## 2020-09-16 DIAGNOSIS — I49.1 PAC (PREMATURE ATRIAL CONTRACTION): ICD-10-CM

## 2020-09-16 PROCEDURE — 93268 ECG RECORD/REVIEW: CPT | Performed by: INTERNAL MEDICINE

## 2020-09-16 NOTE — TELEPHONE ENCOUNTER
Patient enrolled in the 21 day [employer] monitoring program per .  Monitor to be shipped to patient by [employer].  >Pending Baseline.  >Pending EOS.

## 2020-09-28 ENCOUNTER — TELEPHONE (OUTPATIENT)
Dept: CARDIOLOGY | Facility: MEDICAL CENTER | Age: 60
End: 2020-09-28

## 2020-09-28 DIAGNOSIS — R00.2 PALPITATIONS: ICD-10-CM

## 2020-09-28 DIAGNOSIS — I48.0 PAF (PAROXYSMAL ATRIAL FIBRILLATION) (HCC): ICD-10-CM

## 2020-09-29 ENCOUNTER — TELEPHONE (OUTPATIENT)
Dept: CARDIOLOGY | Facility: MEDICAL CENTER | Age: 60
End: 2020-09-29

## 2020-09-29 NOTE — TELEPHONE ENCOUNTER
Lore Jules, Med Ass't  FIDENCIO Emerson can we please change this patients Rx to be Eliquis 5MG BID. Right now it reads Eliquis 2.5MG 2 tablets  BID and insurance isn't covering that if we change it to the 5MG tab I am hoping they will cover that!     Thanks,   Lore        Rx changed to 5 mg strength so it now reads Eliquis 5 mg (1 tablet) BID.

## 2020-09-29 NOTE — TELEPHONE ENCOUNTER
Spoke with the patient concerning results of her event recorder which shows atrial fibrillation with a rapid ventricular response.  [id number] Score is least 2.  Instructed patient to start anticoagulation with Eliquis and discontinue aspirin.  Refer to EP for PAF discussion of treatment options.

## 2020-09-29 NOTE — TELEPHONE ENCOUNTER
Luis Manuel from [facility] called to report abnormal finding on 9/26 at 7:18 pt had new onset of afib reaching 143 bpm, auto triggered. SW has already addressed this with the patient and pt was started on Eliquis.

## 2020-10-05 ENCOUNTER — TELEPHONE (OUTPATIENT)
Dept: CARDIOLOGY | Facility: MEDICAL CENTER | Age: 60
End: 2020-10-05

## 2020-10-05 NOTE — TELEPHONE ENCOUNTER
Called patient in regards to standing blood work ordered by KIMBERLY from 8/20/20, patient stated she got them done recently form [facility]. Lab results requested from [facility] Fax: [phone] Phone:[phone] Conformation fax sent to [facility].

## 2020-10-15 ENCOUNTER — TELEPHONE (OUTPATIENT)
Dept: CARDIOLOGY | Facility: MEDICAL CENTER | Age: 60
End: 2020-10-15

## 2020-10-15 DIAGNOSIS — I48.0 PAF (PAROXYSMAL ATRIAL FIBRILLATION) (HCC): ICD-10-CM

## 2020-10-15 DIAGNOSIS — I47.10 SVT (SUPRAVENTRICULAR TACHYCARDIA) (HCC): ICD-10-CM

## 2020-10-26 ENCOUNTER — TELEMEDICINE2 (OUTPATIENT)
Dept: CARDIOLOGY | Facility: MEDICAL CENTER | Age: 60
End: 2020-10-26
Payer: COMMERCIAL

## 2020-10-26 VITALS
TEMPERATURE: 97.8 F | BODY MASS INDEX: 38.3 KG/M2 | DIASTOLIC BLOOD PRESSURE: 85 MMHG | RESPIRATION RATE: 18 BRPM | SYSTOLIC BLOOD PRESSURE: 129 MMHG | HEIGHT: 69 IN | HEART RATE: 63 BPM | WEIGHT: 258.6 LBS | OXYGEN SATURATION: 95 %

## 2020-10-26 DIAGNOSIS — I48.0 PAF (PAROXYSMAL ATRIAL FIBRILLATION) (HCC): ICD-10-CM

## 2020-10-26 DIAGNOSIS — I25.2 HISTORY OF NON-ST ELEVATION MYOCARDIAL INFARCTION (NSTEMI): ICD-10-CM

## 2020-10-26 DIAGNOSIS — R00.2 PALPITATIONS: ICD-10-CM

## 2020-10-26 DIAGNOSIS — I47.10 SVT (SUPRAVENTRICULAR TACHYCARDIA) (HCC): ICD-10-CM

## 2020-10-26 DIAGNOSIS — R74.8 ELEVATED LIVER ENZYMES: ICD-10-CM

## 2020-10-26 DIAGNOSIS — E78.5 DYSLIPIDEMIA: ICD-10-CM

## 2020-10-26 DIAGNOSIS — I25.10 CORONARY ARTERY DISEASE, NON-OCCLUSIVE: ICD-10-CM

## 2020-10-26 PROCEDURE — 99214 OFFICE O/P EST MOD 30 MIN: CPT | Mod: 95,CR | Performed by: INTERNAL MEDICINE

## 2020-10-26 RX ORDER — METOPROLOL SUCCINATE 25 MG/1
25 TABLET, EXTENDED RELEASE ORAL DAILY
Qty: 60 TAB | Refills: 180 | Status: SHIPPED | OUTPATIENT
Start: 2020-10-26 | End: 2020-10-26 | Stop reason: SDUPTHER

## 2020-10-26 RX ORDER — METOPROLOL SUCCINATE 25 MG/1
25 TABLET, EXTENDED RELEASE ORAL DAILY
Qty: 90 TAB | Refills: 3 | Status: SHIPPED | OUTPATIENT
Start: 2020-10-26 | End: 2021-04-22 | Stop reason: SDUPTHER

## 2020-10-26 ASSESSMENT — ENCOUNTER SYMPTOMS
PALPITATIONS: 0
MYALGIAS: 0
LOSS OF CONSCIOUSNESS: 0
SHORTNESS OF BREATH: 0
COUGH: 0
DIZZINESS: 0

## 2020-10-26 ASSESSMENT — FIBROSIS 4 INDEX: FIB4 SCORE: 1.58

## 2020-10-26 NOTE — PROGRESS NOTES
Chief Complaint   Patient presents with   • Coronary Artery Disease   • Atrial Fibrillation     PAF (paroxysmal atrial fibrillation       Subjective:   Gisel Montez is a 59 y.o. female from [place], [place] who presents today for follow up evaluation for CAD, previous inferior myocardial infarction, PAF, SVT, palpitations and hyperlipidemia.    This evaluation was conducted via [employer] using secure and encrypted videoconferencing technology. The patient was physically located at [facility]  in [place], [place]. The patient was presented by a medical professional at the originating site.   The patient's identity was confirmed and verbal consent was obtained for this telemedicine encounter.     Last seen 8/20/2020.    At 8/20/2020 scheduled a CER to evaluate palpitations which showed PAF.  Started on Eliquis 5 mg twice daily.  She has palpitations generally once a week generally very short but on occasion has an episode that lasts 15 minutes with some mild shortness of breath.    Since 8/9/2018 overall the patient is been doing well though she does report 2-3 times a month of nonexertional palpitations lasting up to 15 to 20 minutes.  No lightheadedness or syncope.  Otherwise remains active.  Her mother passed away at age 82 from multiple myeloma on Mother's Day this year resulting in a fair amount of stress with chest discomfort.  No longer has a clinic affiliated with [facility] in [place] but there is another company that is [employer] [facility] for which she is not very satisfied.    Since 5/4/2018 appointment the patient said no cardiac problems or symptoms.  Compliant with medications.  No palpitations.    Since 5/5/2017 the patient has had no cardiac symptoms.  No angina pectoris or palpitations.  Recent lab shows increasing blood sugar and glycohemoglobin.  Lipid panel stable.     Since 12/16/2016 the patient has had no cardiac symptoms. No angina pectoris or palpitations.  Recent lipid panel was normal.      Since 7/21/2016 appointment the patient's had no cardiac symptoms.  Tolerating and compliant with medications.  Follow-up with PCP Dr. Arambula who placed the patient on a diet.  Patient has lost 10 pounds.     Has new PCP with [facility] Dr Lou Arambula     Since last appointment in 7/2015 the patient stopped cigarette 7 months ago.  She didn't along with a friend.  She is also altered her dietary habits.  No new cardiac symptoms.  Still doesn't have a PCP in [place], [place]     Past medical history  Saw Dr.Steve Barton, hematologist for elevated serum globulins.  Being monitored.  Stopped Plavix in May 2015.     On 10/21/2014 seen at [facility].  Chest pain. Normal troponin. Fixed defect on MPI.  Discharged on medications.     On 10/14/2013 discharged from [facility] after an STEMI inferior MI.  Received TNK in [place], [place] and transferred to [facility].  Cardiac catheterization on 10/10/2013 showed mild CAD and inferior wall hypokinesis. EF was 65%..  Subsequently developed atrial fibrillation requiring electrical cardioversion on 10/13/2013.     Past Medical History:   Diagnosis Date   • ACTIVE SMOKER      4-10 sticks/40   • Acute bronchitis due to infection 10/3/2016   • Acute MI, inferior wall ([facility])     • CAD (coronary artery disease)     • Cardiomyopathy ([facility])    • Class 2 obesity due to excess calories without serious comorbidity in adult 9/10/2018   • Hyperglycemia 10/31/2016   • Hypothyroidism     • PAF (paroxysmal atrial fibrillation) ([facility])     • Palpitations     • Type 2 diabetes mellitus without complication ([facility]) 8/28/2017   • Vitamin D deficiency 2/27/2017     Past Surgical History:   Procedure Laterality Date   • GYN SURGERY      tubal ligation   • OTHER      choleycystectomy     Family History   Problem Relation Age of Onset   • Diabetes Father 68     Social History     Socioeconomic History   • Marital status:      Spouse name: Not on file   • Number of children: Not on file   • Years of  "education: Not on file   • Highest education level: Not on file   Occupational History   • Occupation: other     Comment: [employer]   Social Needs   • Financial resource strain: Not on file   • Food insecurity     Worry: Not on file     Inability: Not on file   • Transportation needs     Medical: Not on file     Non-medical: Not on file   Tobacco Use   • Smoking status: Former Smoker     Packs/day: 0.50     Years: 35.00     Pack years: 17.50     Types: Cigarettes     Quit date: 2015     Years since quittin.4   • Smokeless tobacco: Never Used   Substance and Sexual Activity   • Alcohol use: Yes     Alcohol/week: 3.6 oz     Types: 2 Glasses of wine, 4 Cans of beer per week     Comment: one/week   • Drug use: No   • Sexual activity: Yes     Partners: Male   Lifestyle   • Physical activity     Days per week: Not on file     Minutes per session: Not on file   • Stress: Not on file   Relationships   • Social connections     Talks on phone: Not on file     Gets together: Not on file     Attends Denominational service: Not on file     Active member of club or organization: Not on file     Attends meetings of clubs or organizations: Not on file     Relationship status: Not on file   • Intimate partner violence     Fear of current or ex partner: Not on file     Emotionally abused: Not on file     Physically abused: Not on file     Forced sexual activity: Not on file   Other Topics Concern   • Not on file   Social History Narrative    . Lives in [place], [place]. Works for [employer].     Allergies   Allergen Reactions   • Other Food      \"CELERY\". Became violently ill, tongue swelled and hard to breath.   • Doxycycline      Outpatient Encounter Medications as of 10/26/2020   Medication Sig Dispense Refill   • metoprolol SR (TOPROL XL) 25 MG TABLET SR 24 HR Take 1 Tab by mouth every day. 60 Tab 180   • apixaban (ELIQUIS) 5mg Tab Take 1 Tab by mouth 2 Times a Day. 90 Tab 3   • levothyroxine (SYNTHROID) 112 MCG Tab Take " "1 Tab by mouth every morning before breakfast. 30 Tab 2   • metFORMIN (GLUCOPHAGE) 500 MG Tab TAKE ONE TABLET BY MOUTH EVERY DAY 30 Tab 5   • atorvastatin (LIPITOR) 10 MG Tab Take 1 Tab by mouth every evening. 90 Tab 3   • EPIPEN 2-YVROSE 0.3 MG/0.3ML Solution Auto-injector solution for injection      • vitamin D (CHOLECALCIFEROL) 1000 UNIT TABS Take 2,000 Units by mouth every day.     • tobramycin (TOBREX) 0.3 % Solution ophthalmic solution Place 2 Drops in both eyes every 4 hours. (Patient not taking: Reported on 10/26/2020) 1 Bottle 0   • [DISCONTINUED] metoprolol (LOPRESSOR) 25 MG Tab Take 1 Tab by mouth 2 times a day. 180 Tab 3   • Albuterol Sulfate (PROAIR HFA INH) Inhale  by mouth.     • CINNAMON PO Take 2 Capsule by mouth every day.       No facility-administered encounter medications on file as of 10/26/2020.      Review of Systems   Respiratory: Negative for cough and shortness of breath.    Cardiovascular: Negative for chest pain and palpitations.   Musculoskeletal: Negative for myalgias.   Neurological: Negative for dizziness and loss of consciousness.        Objective:   /85 (BP Location: Left arm, Patient Position: Sitting, BP Cuff Size: Adult)   Pulse 63   Temp 36.6 °C (97.8 °F)   Resp 18   Ht 1.753 m (5' 9\")   Wt 117.3 kg (258 lb 9.6 oz)   SpO2 95%   BMI 38.19 kg/m²     Physical Exam   Constitutional: She is oriented to person, place, and time. No distress.   Cardiovascular: Normal rate, regular rhythm and normal heart sounds.   Pulmonary/Chest: Effort normal and breath sounds normal. She has no wheezes. She has no rales.   Musculoskeletal:         General: No edema.   Neurological: She is alert and oriented to person, place, and time.   Psychiatric: She has a normal mood and affect. Her behavior is normal.     DATE OF PROCEDURE:  10/10/2013  PROCEDURES:  1.  Left heart catheterization.  2.  Coronary angiography.  3.  Left ventriculogram.   PREPROCEDURE DIAGNOSIS:    1. Possible atypical " Takotsubo syndrome.  2. Non obstructive coronary artery disease.  POSTPROCEDURE DIAGNOSIS:  Possible atypical Takotsubo syndrome.    10/12/2013 Echocardiogram  Left ventricular ejection fraction is 55% to 60%.  Mild concentric left ventricular hypertrophy.  Mildly dilated left atrium.  Aortic sclerosis without stenosis.  Right ventricular systolic pressure is estimated to be 25 mmhg.    10/10/2013 Chest CTA  1. No pulmonary embolism  2. Mild atelectasis  3. Previous cholecystectomy  4. Atherosclerosis     2014 Cardiac event recorder: [employer]. 4 beasts SVT. 4 beats VT.    2020 cardiac event recorder:  * 21 day biotel prescribed, 6 days of strips available for review.   * Paroxysmal atrial fibrillation. High 168 BPM, low 65bpm, average 107. 6% of total heart beats. Occasionally symtomatic.   * Rare PVCs.   * Occasional PACs.    Assessment:     1. PAF (paroxysmal atrial fibrillation) (HCC)     2. SVT (supraventricular tachycardia) (HCC)     3. Palpitations     4. History of non-ST elevation myocardial infarction (NSTEMI)     5. Dyslipidemia     6. Coronary artery disease, non-occlusive     7. Elevated liver enzymes  Comp Metabolic Panel       Medical Decision Making:  Today's Assessment / Status / Plan:     Assessment  1. MI. 10/10/2013. Unclear mechanism. ? Cardiac embolic due to PAF.    2. PAF.  Recurrent documented with CER 10/2020.  3. Palpitations.  Intermittent.  4. Hyperlipidemia.  On atorvastatin.  5. SVT. No clinical recurrence.  6. VT. Continue beta blocker therapy.  7. Sleep disorder.    8. Elevated LFTs in 10/2014 now recurrent 2020.  9. Elevated globulin. Per Dr Barton.    10. Obesity.  11. History of tobacco use.  12. Glucose intolerance.  13.  Anticoagulation.  Started on Eliquis.  9/29/2020    Recommendations  1.  I had a detailed discussion with the patient with regards to the findings of the CER demonstrating atrial fibrillation.  2.  Reviewed treatment goals as far as symptomatic relief and  thromboembolic prophylaxis.  3.  Change metoprolol 25 mg tartrate to succinate.  4.  If continues to have symptomatic recurrent palpitations will refer to EP for further management recommendations.  5.  Reviewed recent blood work which shows mildly elevated LFTs which have been intermittently elevated in the past and we will recheck that.  6.  RTC 6 months.

## 2020-10-26 NOTE — PROGRESS NOTES
Chief Complaint   Patient presents with   • Coronary Artery Disease   • Atrial Fibrillation     PAF (paroxysmal atrial fibrillation       Subjective:   Gisel Montez is a 59 y.o. female who presents today for follow up evaluation for CAD, previous inferior myocardial infarction, PAF, SVT, palpitations and hyperlipidemia.     Last seen on 8/9/2019    Since 8/9/2018 overall the patient is been doing well though she does report 2-3 times a month of nonexertional palpitations lasting up to 15 to 20 minutes.  No lightheadedness or syncope.  Otherwise remains active.  Her mother passed away at age 82 from multiple myeloma on Mother's Day this year resulting in a fair amount of stress with chest discomfort.  No longer has a clinic affiliated with [facility] in [place] but there is another company that is [facility] medicine for which she is not very satisfied.    Since 5/4/2018 appointment the patient said no cardiac problems or symptoms.  Compliant with medications.  No palpitations.    Since 5/5/2017 the patient has had no cardiac symptoms.  No angina pectoris or palpitations.  Recent lab shows increasing blood sugar and glycohemoglobin.  Lipid panel stable.     Since 12/16/2016 the patient has had no cardiac symptoms. No angina pectoris or palpitations.  Recent lipid panel was normal.     Since 7/21/2016 appointment the patient's had no cardiac symptoms.  Tolerating and compliant with medications.  Follow-up with PCP Dr. Arambula who placed the patient on a diet.  Patient has lost 10 pounds.     [facility]     Since last appointment in 7/2015 the patient stopped cigarette 7 months ago.  She didn't along with a friend.  She is also altered her dietary habits.  No new cardiac symptoms.  Still doesn't have a PCP in [place], [place]     Past medical history  Saw Dr.Steve Barton, hematologist for elevated serum globulins.  Being monitored.  Stopped Plavix in May 2015.     On 10/21/2014 seen at [facility].  Chest  pain. Normal troponin. Fixed defect on MPI.  Discharged on medications.     On 10/14/2013 discharged from [facility] after an STEMI inferior MI.  Received TNK in [place], [place] and transferred to [facility] [place] [facility].  Cardiac catheterization on 10/10/2013 showed mild CAD and inferior wall hypokinesis. EF was 65%..  Subsequently developed atrial fibrillation requiring electrical cardioversion on 10/13/2013.     Past Medical History:   Diagnosis Date   • ACTIVE SMOKER      4-10 sticks/40   • Acute bronchitis due to infection 10/3/2016   • Acute MI, inferior wall ([facility])     • CAD (coronary artery disease)     • Cardiomyopathy ([facility])    • Class 2 obesity due to excess calories without serious comorbidity in adult 9/10/2018   • Hyperglycemia 10/31/2016   • Hypothyroidism     • PAF (paroxysmal atrial fibrillation) ([facility])     • Palpitations     • Type 2 diabetes mellitus without complication ([facility]) 2017   • Vitamin D deficiency 2017     Past Surgical History:   Procedure Laterality Date   • GYN SURGERY      tubal ligation   • OTHER      choleycystectomy     Family History   Problem Relation Age of Onset   • Diabetes Father 68     Social History     Socioeconomic History   • Marital status:      Spouse name: Not on file   • Number of children: Not on file   • Years of education: Not on file   • Highest education level: Not on file   Occupational History   • Occupation: other     Comment: [employer]   Social Needs   • Financial resource strain: Not on file   • Food insecurity     Worry: Not on file     Inability: Not on file   • Transportation needs     Medical: Not on file     Non-medical: Not on file   Tobacco Use   • Smoking status: Former Smoker     Packs/day: 0.50     Years: 35.00     Pack years: 17.50     Types: Cigarettes     Quit date: 2015     Years since quittin.4   • Smokeless tobacco: Never Used   Substance and Sexual Activity   • Alcohol use: Yes     Alcohol/week: 3.6 oz     Types: 2 Glasses  "of wine, 4 Cans of beer per week     Comment: one/week   • Drug use: No   • Sexual activity: Yes     Partners: Male   Lifestyle   • Physical activity     Days per week: Not on file     Minutes per session: Not on file   • Stress: Not on file   Relationships   • Social connections     Talks on phone: Not on file     Gets together: Not on file     Attends Anglican service: Not on file     Active member of club or organization: Not on file     Attends meetings of clubs or organizations: Not on file     Relationship status: Not on file   • Intimate partner violence     Fear of current or ex partner: Not on file     Emotionally abused: Not on file     Physically abused: Not on file     Forced sexual activity: Not on file   Other Topics Concern   • Not on file   Social History Narrative    . Lives in [place], [place]. Works for [employer].     Allergies   Allergen Reactions   • Other Food      \"CELERY\". Became violently ill, tongue swelled and hard to breath.   • Doxycycline      Outpatient Encounter Medications as of 10/26/2020   Medication Sig Dispense Refill   • apixaban (ELIQUIS) 5mg Tab Take 1 Tab by mouth 2 Times a Day. 90 Tab 3   • levothyroxine (SYNTHROID) 112 MCG Tab Take 1 Tab by mouth every morning before breakfast. 30 Tab 2   • metFORMIN (GLUCOPHAGE) 500 MG Tab TAKE ONE TABLET BY MOUTH EVERY DAY 30 Tab 5   • metoprolol (LOPRESSOR) 25 MG Tab Take 1 Tab by mouth 2 times a day. 180 Tab 3   • atorvastatin (LIPITOR) 10 MG Tab Take 1 Tab by mouth every evening. 90 Tab 3   • EPIPEN 2-YVROSE 0.3 MG/0.3ML Solution Auto-injector solution for injection      • vitamin D (CHOLECALCIFEROL) 1000 UNIT TABS Take 2,000 Units by mouth every day.     • tobramycin (TOBREX) 0.3 % Solution ophthalmic solution Place 2 Drops in both eyes every 4 hours. (Patient not taking: Reported on 10/26/2020) 1 Bottle 0   • Albuterol Sulfate (PROAIR HFA INH) Inhale  by mouth.     • CINNAMON PO Take 2 Capsule by mouth every day.       No " "facility-administered encounter medications on file as of 10/26/2020.      Review of Systems   Respiratory: Negative for cough and shortness of breath.    Cardiovascular: Negative for chest pain and palpitations.   Musculoskeletal: Negative for myalgias.   Neurological: Negative for dizziness and loss of consciousness.        Objective:   /85 (BP Location: Left arm, Patient Position: Sitting, BP Cuff Size: Adult)   Pulse 63   Temp 36.6 °C (97.8 °F)   Resp 18   Ht 1.753 m (5' 9\")   Wt 117.3 kg (258 lb 9.6 oz)   SpO2 95%   BMI 38.19 kg/m²     Physical Exam   Constitutional: She is oriented to person, place, and time. She appears well-developed and well-nourished. No distress.   Eyes: Pupils are equal, round, and reactive to light. EOM are normal.   Neck: No JVD present.   Cardiovascular: Normal rate, regular rhythm, normal heart sounds and intact distal pulses. Exam reveals no gallop and no friction rub.   No murmur heard.  Pulmonary/Chest: Effort normal and breath sounds normal. No respiratory distress. She has no wheezes. She has no rales.   Musculoskeletal:         General: No edema.   Neurological: She is alert and oriented to person, place, and time.   Skin: Skin is warm and dry.   Psychiatric: She has a normal mood and affect. Her behavior is normal.     DATE OF PROCEDURE:  10/10/2013  PROCEDURES:  1.  Left heart catheterization.  2.  Coronary angiography.  3.  Left ventriculogram.   PREPROCEDURE DIAGNOSIS:    1. Possible atypical Takotsubo syndrome.  2. Non obstructive coronary artery disease.  POSTPROCEDURE DIAGNOSIS:  Possible atypical Takotsubo syndrome.    10/12/2013 Echocardiogram  Left ventricular ejection fraction is 55% to 60%.  Mild concentric left ventricular hypertrophy.  Mildly dilated left atrium.  Aortic sclerosis without stenosis.  Right ventricular systolic pressure is estimated to be 25 mmhg.    10/10/2013 Chest CTA  1. No pulmonary embolism  2. Mild atelectasis  3. Previous " cholecystectomy  4. Atherosclerosis     2014 Cardiac event recorder: [employer]. 4 beasts SVT. 4 beats VT.     Assessment:     1. SVT (supraventricular tachycardia) (HCC)     2. Palpitations     3. History of non-ST elevation myocardial infarction (NSTEMI)     4. Dyslipidemia     5. Coronary artery disease, non-occlusive         Medical Decision Making:  Today's Assessment / Status / Plan:     Assessment  1. MI. 10/10/2013. Unclear mechanism. ? Cardiac embolic due to PAF.       2. PAF. Continue current therapy. On aspirin. Now off plavix.     3. Palpitations. Resolved.      4. Hyperlipidemia.  On atorvastatin.     5. SVT. No clinical recurrence.     6. VT. Continue beta blocker therapy.     7. Sleep disorder.       8. Elevated LFTs in 10/2014 now resolved.     9. Elevated globulin. Per Dr Barton.       10. Obesity.     11. History of tobacco use.    12. Glucose intolerance.     Recommendations  1.  To evaluate the patient's palpitations will get a 3-week cardiac event recorder.  2.  Comprehensive laboratory evaluation  3.  Continue current cardiac therapy.  4.  RTC 1 year.

## 2020-11-11 ENCOUNTER — TELEPHONE (OUTPATIENT)
Dept: CARDIOLOGY | Facility: MEDICAL CENTER | Age: 60
End: 2020-11-11

## 2020-11-11 DIAGNOSIS — R74.8 ELEVATED LIVER ENZYMES: ICD-10-CM

## 2020-11-11 NOTE — TELEPHONE ENCOUNTER
Notify patient repeat liver function tests are still slightly elevated as they were in 9/2020.  Have her discontinue atorvastatin and repeat nonfasting CMP 2 weeks later

## 2020-11-11 NOTE — TELEPHONE ENCOUNTER
Spoke to patient and discussed recommendations. Patient verbalized understanding and was appreciative for the phone call.    Lab order entered and mailed to patient.

## 2020-12-04 DIAGNOSIS — I25.10 CORONARY ARTERY DISEASE, NON-OCCLUSIVE: ICD-10-CM

## 2021-02-16 ENCOUNTER — TELEPHONE (OUTPATIENT)
Dept: CARDIOLOGY | Facility: MEDICAL CENTER | Age: 61
End: 2021-02-16

## 2021-02-16 NOTE — TELEPHONE ENCOUNTER
FORMULARIES: Enoxaparin 40 Mg/0.4 Ml Syrg tier-4 NOT Required* Warfarin 5 Mg Tab tier-5 NOT Required* Xarelto 20 Mg Tab tier-1 NOT Required* Xarelto DVT-PE Treat 30D Start tier-3 NOT Required* Pradaxa 150 Mg Cap tier-2 Required* Savaysa 60 Mg Tab tier-6 Required*    ADRIANE BORJA Key: [id number] - Rx #: [id number] Need help? Call us at [phone]   Outcome   Additional Information Required   FARRUKH Everett has indicated that it is too soon to refill this medication at the pharmacy for your patient. If you need to renew an existing PA for your patient's medication, please reach out to [facility] directly at [phone]   DrugEliquis 5MG tablets   [employer] Electronic PA Form (NCPDP)   Original Claim Info76 (PHARMACY HELP DESK [id number])

## 2021-03-07 DIAGNOSIS — I25.2 HISTORY OF NON-ST ELEVATION MYOCARDIAL INFARCTION (NSTEMI): ICD-10-CM

## 2021-03-10 RX ORDER — APIXABAN 5 MG/1
TABLET, FILM COATED ORAL
Qty: 180 TABLET | Refills: 2 | Status: SHIPPED | OUTPATIENT
Start: 2021-03-10 | End: 2021-11-12

## 2021-04-13 ENCOUNTER — TELEPHONE (OUTPATIENT)
Dept: CARDIOLOGY | Facility: MEDICAL CENTER | Age: 61
End: 2021-04-13

## 2021-04-13 DIAGNOSIS — I48.0 PAF (PAROXYSMAL ATRIAL FIBRILLATION) (HCC): ICD-10-CM

## 2021-04-13 NOTE — TELEPHONE ENCOUNTER
----- Message from Jerica Brandon sent at 4/13/2021  1:42 PM PDT -----  Regarding: EKG order for Dr. Shipman's Telemed clinic on 4/22/2021  Please provide an EKG order per above.  Thanks, Jerica

## 2021-04-22 ENCOUNTER — TELEMEDICINE2 (OUTPATIENT)
Dept: CARDIOLOGY | Facility: MEDICAL CENTER | Age: 61
End: 2021-04-22
Payer: COMMERCIAL

## 2021-04-22 VITALS
WEIGHT: 240 LBS | OXYGEN SATURATION: 93 % | DIASTOLIC BLOOD PRESSURE: 67 MMHG | SYSTOLIC BLOOD PRESSURE: 101 MMHG | BODY MASS INDEX: 36.37 KG/M2 | HEART RATE: 76 BPM | HEIGHT: 68 IN | TEMPERATURE: 98.3 F

## 2021-04-22 DIAGNOSIS — I25.2 HISTORY OF NON-ST ELEVATION MYOCARDIAL INFARCTION (NSTEMI): ICD-10-CM

## 2021-04-22 DIAGNOSIS — E11.9 TYPE 2 DIABETES MELLITUS WITHOUT COMPLICATION, WITHOUT LONG-TERM CURRENT USE OF INSULIN (HCC): ICD-10-CM

## 2021-04-22 DIAGNOSIS — I25.10 CORONARY ARTERY DISEASE, NON-OCCLUSIVE: ICD-10-CM

## 2021-04-22 DIAGNOSIS — I48.0 PAF (PAROXYSMAL ATRIAL FIBRILLATION) (HCC): ICD-10-CM

## 2021-04-22 DIAGNOSIS — I47.10 SVT (SUPRAVENTRICULAR TACHYCARDIA) (HCC): ICD-10-CM

## 2021-04-22 PROCEDURE — 99214 OFFICE O/P EST MOD 30 MIN: CPT | Performed by: INTERNAL MEDICINE

## 2021-04-22 RX ORDER — METOPROLOL SUCCINATE 50 MG/1
50 TABLET, EXTENDED RELEASE ORAL DAILY
Qty: 90 TABLET | Refills: 3 | Status: SHIPPED | OUTPATIENT
Start: 2021-04-22 | End: 2022-04-07

## 2021-04-22 RX ORDER — AZITHROMYCIN 250 MG/1
TABLET, FILM COATED ORAL
COMMUNITY
Start: 2021-02-03 | End: 2021-04-22

## 2021-04-22 ASSESSMENT — ENCOUNTER SYMPTOMS
COUGH: 0
BLURRED VISION: 0
NAUSEA: 0
DECREASED APPETITE: 0
DEPRESSION: 0
FLANK PAIN: 0
DIARRHEA: 0
DYSPNEA ON EXERTION: 0
ORTHOPNEA: 0
VOMITING: 0
HEARTBURN: 0
CLAUDICATION: 0
WEIGHT LOSS: 0
SYNCOPE: 0
NEAR-SYNCOPE: 0
WEIGHT GAIN: 0
SHORTNESS OF BREATH: 0
BACK PAIN: 0
PND: 0
CONSTIPATION: 0
IRREGULAR HEARTBEAT: 0
ABDOMINAL PAIN: 0
ALTERED MENTAL STATUS: 0
DIZZINESS: 0
PALPITATIONS: 1
FEVER: 0

## 2021-04-22 ASSESSMENT — FIBROSIS 4 INDEX: FIB4 SCORE: 1.58

## 2021-04-22 NOTE — PATIENT INSTRUCTIONS
Empagliflozin oral tablets  What is this medicine?  EMPAGLIFLOZIN (EM pa gli FLOE zin) helps to treat type 2 diabetes. It helps to control blood sugar. This drug may also reduce the risk of heart attack or stroke if you have type 2 diabetes and risk factors for heart disease. Treatment is combined with diet and exercise.  This medicine may be used for other purposes; ask your health care provider or pharmacist if you have questions.  COMMON BRAND NAME(S): JARDIANCE  What should I tell my health care provider before I take this medicine?  They need to know if you have any of these conditions:  · dehydration  · diabetic ketoacidosis  · diet low in salt  · eating less due to illness, surgery, dieting, or any other reason  · having surgery  · high cholesterol  · high levels of potassium in the blood  · history of pancreatitis or pancreas problems  · history of yeast infection of the penis or vagina  · if you often drink alcohol  · infections in the bladder, kidneys, or urinary tract  · kidney disease  · liver disease  · low blood pressure  · on hemodialysis  · problems urinating  · type 1 diabetes  · uncircumcised male  · an unusual or allergic reaction to empagliflozin, other medicines, foods, dyes, or preservatives  · pregnant or trying to get pregnant  · breast-feeding  How should I use this medicine?  Take this medicine by mouth with a glass of water. Follow the directions on the prescription label. Take it in the morning, with or without food. Take your dose at the same time each day. Do not take more often than directed. Do not stop taking except on your doctor's advice.  Talk to your pediatrician regarding the use of this medicine in children. Special care may be needed.  Overdosage: If you think you have taken too much of this medicine contact a poison control center or emergency room at once.  NOTE: This medicine is only for you. Do not share this medicine with others.  What if I miss a dose?  If you miss a  dose, take it as soon as you can. If it is almost time for your next dose, take only that dose. Do not take double or extra doses.  What may interact with this medicine?  Do not take this medicine with any of the following medications:  · gatifloxacin  This medicine may also interact with the following medications:  · alcohol  · certain medicines for blood pressure, heart disease  · diuretics  This list may not describe all possible interactions. Give your health care provider a list of all the medicines, herbs, non-prescription drugs, or dietary supplements you use. Also tell them if you smoke, drink alcohol, or use illegal drugs. Some items may interact with your medicine.  What should I watch for while using this medicine?  Visit your doctor or health care professional for regular checks on your progress.  This medicine can cause a serious condition in which there is too much acid in the blood. If you develop nausea, vomiting, stomach pain, unusual tiredness, or breathing problems, stop taking this medicine and call your doctor right away. If possible, use a ketone dipstick to check for ketones in your urine.  A test called the HbA1C (A1C) will be monitored. This is a simple blood test. It measures your blood sugar control over the last 2 to 3 months. You will receive this test every 3 to 6 months.  Learn how to check your blood sugar. Learn the symptoms of low and high blood sugar and how to manage them.  Always carry a quick-source of sugar with you in case you have symptoms of low blood sugar. Examples include hard sugar candy or glucose tablets. Make sure others know that you can choke if you eat or drink when you develop serious symptoms of low blood sugar, such as seizures or unconsciousness. They must get medical help at once.  Tell your doctor or health care professional if you have high blood sugar. You might need to change the dose of your medicine. If you are sick or exercising more than usual, you  might need to change the dose of your medicine.  Do not skip meals. Ask your doctor or health care professional if you should avoid alcohol. Many nonprescription cough and cold products contain sugar or alcohol. These can affect blood sugar.  Wear a medical ID bracelet or chain, and carry a card that describes your disease and details of your medicine and dosage times.  What side effects may I notice from receiving this medicine?  Side effects that you should report to your doctor or health care professional as soon as possible:  · allergic reactions like skin rash, itching or hives, swelling of the face, lips, or tongue  · breathing problems  · dizziness  · feeling faint or lightheaded, falls  · muscle weakness  · nausea, vomiting, unusual stomach upset or pain  · penile discharge, itching, or pain in men  · signs and symptoms of a genital infection, such as fever; tenderness, redness, or swelling in the genitals or area from the genitals to the back of the rectum  · signs and symptoms of low blood sugar such as feeling anxious, confusion, dizziness, increased hunger, unusually weak or tired, sweating, shakiness, cold, irritable, headache, blurred vision, fast heartbeat, loss of consciousness  · signs and symptoms of a urinary tract infection, such as fever, chills, a burning feeling when urinating, blood in the urine, back pain  · trouble passing urine or change in the amount of urine, including an urgent need to urinate more often, in larger amounts, or at night  · unusual tiredness  · vaginal discharge, itching, or odor in women  Side effects that usually do not require medical attention (report to your doctor or health care professional if they continue or are bothersome):  · mild increase in urination  · thirsty  This list may not describe all possible side effects. Call your doctor for medical advice about side effects. You may report side effects to FDA at 8-727-FDA-3349.  Where should I keep my  medicine?  Keep out of the reach of children.  Store at room temperature between 20 and 25 degrees C (68 and 77 degrees F). Throw away any unused medicine after the expiration date.  NOTE: This sheet is a summary. It may not cover all possible information. If you have questions about this medicine, talk to your doctor, pharmacist, or health care provider.  © 2020 Elsevier/Gold Standard (2018-08-30 10:25:34)      Atrial Fibrillation  Atrial fibrillation is a type of irregular or rapid heartbeat (arrhythmia). In atrial fibrillation, the top part of the heart (atria) quivers in a chaotic pattern. This makes the heart unable to pump blood normally. Having atrial fibrillation can increase your risk for other health problems, such as:  · Blood can pool in the atria and form clots. If a clot travels to the brain, it can cause a stroke.  · The heart muscle may weaken from the irregular blood flow. This can cause heart failure.  Atrial fibrillation may start suddenly and stop on its own, or it may become a long-lasting problem.  What are the causes?  This condition is caused by some heart-related conditions or procedures, including:  · High blood pressure. This is the most common cause.  · Heart failure.  · Heart valve conditions.  · Inflammation of the sac that surrounds the heart (pericarditis).  · Heart surgery.  · Coronary artery disease.  · Certain heart rhythm disorders, such as Stock-Parkinson-White syndrome.  Other causes include:  · Pneumonia.  · Obstructive sleep apnea.  · Lung cancer.  · Thyroid problems, especially if the thyroid is overactive (hyperthyroidism).  · Excessive alcohol or drug use.  Sometimes, the cause of this condition is not known.  What increases the risk?  This condition is more likely to develop in:  · Older people.  · People who smoke.  · People who have diabetes mellitus.  · People who are overweight (obese).  · Athletes who exercise vigorously.  · People who have a family history.  What are  the signs or symptoms?  Symptoms of this condition include:  · A feeling that your heart is beating rapidly or irregularly.  · A feeling of discomfort or pain in your chest.  · Shortness of breath.  · Sudden light-headedness or weakness.  · Getting tired easily during exercise.  In some cases, there are no symptoms.  How is this diagnosed?  Your health care provider may be able to detect atrial fibrillation when taking your pulse. If detected, this condition may be diagnosed with:  · Electrocardiogram (ECG).  · Ambulatory cardiac monitor. This device records your heartbeats for 24 hours or more.  · Transthoracic echocardiogram (TTE) to evaluate how blood flows through your heart.  · Transesophageal echocardiogram (TOÑO) to view more detailed images of your heart.  · A stress test.  · Imaging tests, such as a CT scan or chest X-ray.  · Blood tests.  How is this treated?  This condition may be treated with:  · Medicines to slow down the heart rate or bring the heart's rhythm back to normal.  · Medicines to prevent blood clots from forming.  · Electrical cardioversion. This delivers a low-energy shock to the heart to reset its rhythm.  · Ablation. This procedure destroys the part of the heart tissue that sends abnormal signals.  · Left atrial appendage occlusion/excision. This seals off a common place in the atria where blood clots can form (left atrial appendage).  The goal of treatment is to prevent blood clots from forming and to keep your heart beating at a normal rate and rhythm. Treatment depends on underlying medical conditions and how you feel when you are experiencing fibrillation.  Follow these instructions at home:  Medicines  · Take over-the counter and prescription medicines only as told by your health care provider.  · If your health care provider prescribed a blood-thinning medicine (anticoagulant), take it exactly as told. Taking too much blood-thinning medicine can cause bleeding. Taking too little can  "enable a blood clot to form and travel to the brain, causing a stroke.  Lifestyle         · Do not use any products that contain nicotine or tobacco, such as cigarettes and e-cigarettes. If you need help quitting, ask your health care provider.  · Do not drink beverages that contain caffeine, such as coffee, soda, and tea.  · Follow diet instructions as told by your health care provider.  · Exercise regularly as told by your health care provider.  · Do not drink alcohol.  General instructions  · If you have obstructive sleep apnea, manage your condition as told by your health care provider.  · Maintain a healthy weight. Do not use diet pills unless your health care provider approves. Diet pills may make heart problems worse.  · Keep all follow-up visits as told by your health care provider. This is important.  Contact a health care provider if you:  · Notice a change in the rate, rhythm, or strength of your heartbeat.  · Are taking an anticoagulant and you notice increased bruising.  · Tire more easily when you exercise or exert yourself.  · Have a sudden change in weight.  Get help right away if you have:    · Chest pain, abdominal pain, sweating, or weakness.  · Difficulty breathing.  · Blood in your vomit, stool (feces), or urine.  · Any symptoms of a stroke. \"BE FAST\" is an easy way to remember the main warning signs of a stroke:  ? B - Balance. Signs are dizziness, sudden trouble walking, or loss of balance.  ? E - Eyes. Signs are trouble seeing or a sudden change in vision.  ? F - Face. Signs are sudden weakness or numbness of the face, or the face or eyelid drooping on one side.  ? A - Arms. Signs are weakness or numbness in an arm. This happens suddenly and usually on one side of the body.  ? S - Speech. Signs are sudden trouble speaking, slurred speech, or trouble understanding what people say.  ? T - Time. Time to call emergency services. Write down what time symptoms started.  · Other signs of a stroke, " such as:  ? A sudden, severe headache with no known cause.  ? Nausea or vomiting.  ? Seizure.  These symptoms may represent a serious problem that is an emergency. Do not wait to see if the symptoms will go away. Get medical help right away. Call your local emergency services (911 in the [place] Do not drive yourself to the hospital.  Summary  · Atrial fibrillation is a type of irregular or rapid heartbeat (arrhythmia).  · Symptoms include a feeling that your heart is beating fast or irregularly. In some cases, you may not have symptoms.  · The condition is treated with medicines to slow down the heart rate or bring the heart's rhythm back to normal. You may also need blood-thinning medicines to prevent blood clots.  · Get help right away if you have symptoms or signs of a stroke.  This information is not intended to replace advice given to you by your health care provider. Make sure you discuss any questions you have with your health care provider.  Document Released: 12/18/2006 Document Revised: 02/07/2019 Document Reviewed: 02/08/2019  Elsevier Patient Education © 2020 Elsevier Inc.

## 2021-04-22 NOTE — PROGRESS NOTES
This consultation was conducted utilizing secure and encrypted videoconferencing equipment with the assistance of a trained tele-presenter at the originating site.    Cardiology Note    Chief Complaint   Patient presents with   • Atrial Fibrillation     F/V Dx: PAF (paroxysmal atrial fibrillation) (HCC)   • Supraventricular Tachycardia (SVT)     F/V   • Coronary Artery Disease     F/V Dx: Coronary artery disease, non-occlusive       History of Present Illness: Gisel Montez is a 60 y.o. female PMH CAD / hx inf MI 2013, paroxysmal AF, SVT, HLD who presents for follow up.     She is symptomatic to AF and feels fluttering. Now feels once per week or once every other week. Improved compared to prior. Improved since switch metoprolol succinate switch.     Review of Systems   Constitution: Negative for decreased appetite, fever, malaise/fatigue, weight gain and weight loss.   HENT: Negative for congestion and nosebleeds.    Eyes: Negative for blurred vision.   Cardiovascular: Positive for palpitations. Negative for chest pain, claudication, dyspnea on exertion, irregular heartbeat, leg swelling, near-syncope, orthopnea, paroxysmal nocturnal dyspnea and syncope.   Respiratory: Negative for cough and shortness of breath.    Endocrine: Negative for cold intolerance and heat intolerance.   Skin: Negative for rash.   Musculoskeletal: Negative for back pain.   Gastrointestinal: Negative for abdominal pain, constipation, diarrhea, heartburn, melena, nausea and vomiting.   Genitourinary: Negative for dysuria, flank pain and hematuria.   Neurological: Negative for dizziness.   Psychiatric/Behavioral: Negative for altered mental status and depression.         Past Medical History:   Diagnosis Date   • ACTIVE SMOKER      4-10 sticks/40   • Acute bronchitis due to infection 10/3/2016   • Acute MI, inferior wall (HCC)     • CAD (coronary artery disease)     • Cardiomyopathy (HCC)    • Class 2 obesity due to excess calories without  "serious comorbidity in adult 9/10/2018   • Hyperglycemia 10/31/2016   • Hypothyroidism     • PAF (paroxysmal atrial fibrillation) ([facility])     • Palpitations     • Type 2 diabetes mellitus without complication (HCC) 2017   • Vitamin D deficiency 2017         Past Surgical History:   Procedure Laterality Date   • GYN SURGERY      tubal ligation   • OTHER      choleycystectomy         Current Outpatient Medications   Medication Sig Dispense Refill   • metoprolol SR (TOPROL XL) 50 MG TABLET SR 24 HR Take 1 tablet by mouth every day. 90 tablet 3   • ELIQUIS 5 MG Tab TAKE ONE TABLET BY MOUTH TWICE A  tablet 2   • levothyroxine (SYNTHROID) 112 MCG Tab Take 1 Tab by mouth every morning before breakfast. 30 Tab 2   • metFORMIN (GLUCOPHAGE) 500 MG Tab TAKE ONE TABLET BY MOUTH EVERY DAY (Patient taking differently: 2 times a day.) 30 Tab 5   • CINNAMON PO Take 2 Capsule by mouth every day.     • EPIPEN 2-YVROSE 0.3 MG/0.3ML Solution Auto-injector solution for injection      • vitamin D (CHOLECALCIFEROL) 1000 UNIT TABS Take 2,000 Units by mouth every day.       No current facility-administered medications for this visit.         Allergies   Allergen Reactions   • Other Food      \"CELERY\". Became violently ill, tongue swelled and hard to breath.   • Doxycycline          Family History   Problem Relation Age of Onset   • Diabetes Father 68         Social History     Socioeconomic History   • Marital status:      Spouse name: Not on file   • Number of children: Not on file   • Years of education: Not on file   • Highest education level: Not on file   Occupational History   • Occupation: other     Comment: [employer]   Tobacco Use   • Smoking status: Former Smoker     Packs/day: 0.50     Years: 35.00     Pack years: 17.50     Types: Cigarettes     Quit date: 2015     Years since quittin.9   • Smokeless tobacco: Never Used   Substance and Sexual Activity   • Alcohol use: Yes     Alcohol/week: 3.6 oz     " "Types: 2 Glasses of wine, 4 Cans of beer per week     Comment: one/week   • Drug use: No   • Sexual activity: Yes     Partners: Male   Other Topics Concern   • Not on file   Social History Narrative    . Lives in [place], [place]. Works for [employer].     Social Determinants of Health     Financial Resource Strain:    • Difficulty of Paying Living Expenses:    Food Insecurity:    • Worried About Running Out of Food in the Last Year:    • Ran Out of Food in the Last Year:    Transportation Needs:    • Lack of Transportation (Medical):    • Lack of Transportation (Non-Medical):    Physical Activity:    • Days of Exercise per Week:    • Minutes of Exercise per Session:    Stress:    • Feeling of Stress :    Social Connections:    • Frequency of Communication with Friends and Family:    • Frequency of Social Gatherings with Friends and Family:    • Attends Orthodoxy Services:    • Active Member of Clubs or Organizations:    • Attends Club or Organization Meetings:    • Marital Status:    Intimate Partner Violence:    • Fear of Current or Ex-Partner:    • Emotionally Abused:    • Physically Abused:    • Sexually Abused:          Physical Exam:  Ambulatory Vitals  /67 (BP Location: Right arm, Patient Position: Sitting, BP Cuff Size: Adult)   Pulse 76   Temp 36.8 °C (98.3 °F) (Temporal)   Ht 1.727 m (5' 8\")   Wt 109 kg (240 lb)   SpO2 93%    BP Readings from Last 4 Encounters:   04/22/21 101/67   10/26/20 129/85   08/20/20 120/72   02/10/20 122/79     Weight/BMI:   Vitals:    04/22/21 1444   BP: 101/67   Weight: 109 kg (240 lb)   Height: 1.727 m (5' 8\")    Body mass index is 36.49 kg/m².  Wt Readings from Last 4 Encounters:   04/22/21 109 kg (240 lb)   10/26/20 117 kg (258 lb 9.6 oz)   08/20/20 116 kg (256 lb 6.4 oz)   02/10/20 112 kg (247 lb)       Physical Exam   Constitutional: She is oriented to person, place, and time and well-developed, well-nourished, and in no distress. No distress.   HENT:   Head: " Normocephalic and atraumatic.   Eyes: Pupils are equal, round, and reactive to light. Conjunctivae are normal.   Neck: No JVD present.   Cardiovascular: Normal rate, regular rhythm, normal heart sounds and intact distal pulses. Exam reveals no gallop and no friction rub.   No murmur heard.  Pulmonary/Chest: Effort normal and breath sounds normal. No respiratory distress. She has no wheezes. She has no rales. She exhibits no tenderness.   Abdominal: Soft. Bowel sounds are normal. She exhibits no distension.   Musculoskeletal:         General: No edema.      Cervical back: Normal range of motion and neck supple.   Neurological: She is alert and oriented to person, place, and time.   Skin: Skin is warm and dry.   Psychiatric: Affect and judgment normal.       Lab Data Review:  Lab Results   Component Value Date/Time    CHOLSTRLTOT 104 04/25/2019 07:54 AM    CHOLSTRLTOT 106 10/17/2016 10:04 AM    LDL 46 04/25/2019 07:54 AM    LDL 49 10/17/2016 10:04 AM    HDL 38 (L) 04/25/2019 07:54 AM    HDL 33 (A) 10/17/2016 10:04 AM    TRIGLYCERIDE 99 04/25/2019 07:54 AM    TRIGLYCERIDE 121 10/17/2016 10:04 AM       Lab Results   Component Value Date/Time    SODIUM 139 04/25/2019 07:54 AM    SODIUM 133 (L) 10/17/2016 10:04 AM    POTASSIUM 4.4 04/25/2019 07:54 AM    POTASSIUM 4.1 10/17/2016 10:04 AM    CHLORIDE 105 04/25/2019 07:54 AM    CHLORIDE 104 10/17/2016 10:04 AM    CO2 19 (L) 04/25/2019 07:54 AM    CO2 22 10/17/2016 10:04 AM    GLUCOSE 129 (H) 04/25/2019 07:54 AM    GLUCOSE 108 (H) 10/17/2016 10:04 AM    BUN 13 04/25/2019 07:54 AM    BUN 11 10/17/2016 10:04 AM    CREATININE 0.66 04/25/2019 07:54 AM    CREATININE 0.52 10/17/2016 10:04 AM    BUNCREATRAT 20 04/25/2019 07:54 AM     CrCl cannot be calculated (Patient's most recent lab result is older than the maximum 7 days allowed.).  Lab Results   Component Value Date/Time    ALKPHOSPHAT 89 04/25/2019 07:54 AM    ALKPHOSPHAT 81 10/17/2016 10:04 AM    ASTSGOT 23 04/25/2019 07:54  AM    ASTSGOT 24 10/17/2016 10:04 AM    ALTSGPT 18 04/25/2019 07:54 AM    ALTSGPT 22 10/17/2016 10:04 AM    TBILIRUBIN 0.4 04/25/2019 07:54 AM    TBILIRUBIN 0.4 10/17/2016 10:04 AM      Lab Results   Component Value Date/Time    WBC 7.0 04/25/2019 07:54 AM    WBC 7.2 10/17/2016 10:04 AM     Lab Results   Component Value Date/Time    HBA1C 6.0 (A) 11/14/2019 01:06 PM     No components found for: TROP    Lipid 09/2020 outside labs  -tot chol 98, tig 96, HDL 33, LDL 46    Cardiac Imaging and Procedures Review:      EKG 4/22/21 sinus rhythm    Pharm stress MPI 10/2014   IMPRESSIONS        ECG Interpretation will be added as an addendum to   this   report.    Infarct with orion-infarct ischemia basal inferolateral region.     21 day biotel 10/2020  CONCLUSIONS:   * 21 day biotel prescribed, 6 days of strips available for review.   * Paroxysmal atrial fibrillation. High 168 BPM, low 65bpm, average 107. 6% of total heart beats. Occasionally symtomatic.   * Rare PVCs.   * Occasional PACs.     TTE 10/2013  Left Ventricle   Normal left ventricular chamber size. Normal left ventricular systolic   function. Mild concentric left ventricular hypertrophy. Left   ventricular ejection fraction is 55% to 60%.     [facility] 10/2013  Heart catheterization 10/10/13  1. Possible atypical Takotsubo syndrome.   2. Non obstructive coronary artery disease, 30-40% mid circumflex disease    Medical Decision Making:  Problem List Items Addressed This Visit     PAF (paroxysmal atrial fibrillation) ([facility])    Relevant Medications    metoprolol SR (TOPROL XL) 50 MG TABLET SR 24 HR    SVT (supraventricular tachycardia) ([facility])    Relevant Medications    metoprolol SR (TOPROL XL) 50 MG TABLET SR 24 HR    Coronary artery disease, non-occlusive    Relevant Medications    metoprolol SR (TOPROL XL) 50 MG TABLET SR 24 HR    Other Relevant Orders    LIPID PANEL    Type 2 diabetes mellitus without complication ([facility])    History of non-ST elevation myocardial  infarction (NSTEMI)        Paroxysmal AF - chadsvasc 3 - doac for cva prevention. Metoprolol for rate control; titrate up. Discussed ablation she is not ready at this time despite still occasinally symptomatic.    DM2 - consider SGLT2i    CAD, nonobstructive - annual lipids. Will reconsider statin, perhaps lower intensity.    It was my pleasure to meet with Ms. Montez.    This consultation was conducted utilizing secure and encrypted videoconferencing equipment with the assistance of a trained tele-presenter at the originating site.

## 2021-11-12 ENCOUNTER — PATIENT MESSAGE (OUTPATIENT)
Dept: CARDIOLOGY | Facility: MEDICAL CENTER | Age: 61
End: 2021-11-12

## 2021-11-12 NOTE — PATIENT COMMUNICATION
You  Nolberto Shipman M.D. 34 minutes ago (1:44 PM)       Ok to switch to Xarelto?   Thanks!      Nolberto Shipman M.D.  You 11 minutes ago (2:06 PM)       Radha Burt, xarelto 20mg daily would be fine. I think they have samples on [facility] too.   Thank you!

## 2022-04-06 DIAGNOSIS — I48.0 PAF (PAROXYSMAL ATRIAL FIBRILLATION) (HCC): ICD-10-CM

## 2022-04-07 RX ORDER — METOPROLOL SUCCINATE 50 MG/1
50 TABLET, EXTENDED RELEASE ORAL DAILY
Qty: 90 TABLET | Refills: 0 | Status: SHIPPED | OUTPATIENT
Start: 2022-04-07 | End: 2022-05-20 | Stop reason: SDUPTHER

## 2022-05-11 ENCOUNTER — TELEPHONE (OUTPATIENT)
Dept: CARDIOLOGY | Facility: MEDICAL CENTER | Age: 62
End: 2022-05-11
Payer: COMMERCIAL

## 2022-05-11 DIAGNOSIS — I48.0 PAF (PAROXYSMAL ATRIAL FIBRILLATION) (HCC): ICD-10-CM

## 2022-05-11 NOTE — TELEPHONE ENCOUNTER
Provider, Ángel, called from [facility]  Looking for order for the Urgent Echo. F#: [phone]. Please call with updates PH#: [phone] direct line.

## 2022-05-11 NOTE — TELEPHONE ENCOUNTER
Upon chart review, pt was last seen by MD HENRY.    Findings relayed to MD HENRY's primary RN for follow up, per workflow.

## 2022-05-11 NOTE — TELEPHONE ENCOUNTER
Spoke with team    Hospital patient presents with pericarditis symptoms EKG shows sinus rhythm with PACs negative troponin    Impression pericarditis would do empiric colchicine    We will arrange for an urgent echocardiogram tomorrow in [place], order faxed to Imaging department the sonographer here will carry it    Please arrange for have a virtual visit or telemedicine appointment with Dr. Fernández or LAKESHA next available

## 2022-05-11 NOTE — TELEPHONE ENCOUNTER
Please arrange for have a virtual visit or telemedicine appointment with Dr. Fernández or LAKESHA next available  -------------------    Route to  pool

## 2022-05-12 ENCOUNTER — TELEPHONE (OUTPATIENT)
Dept: CARDIOLOGY | Facility: MEDICAL CENTER | Age: 62
End: 2022-05-12
Payer: COMMERCIAL

## 2022-05-12 DIAGNOSIS — I47.10 SVT (SUPRAVENTRICULAR TACHYCARDIA) (HCC): ICD-10-CM

## 2022-05-12 DIAGNOSIS — I48.0 PAF (PAROXYSMAL ATRIAL FIBRILLATION) (HCC): ICD-10-CM

## 2022-05-12 DIAGNOSIS — R07.2 PRECORDIAL CHEST PAIN: ICD-10-CM

## 2022-05-13 NOTE — TELEPHONE ENCOUNTER
Brandi Brown, Med Ass't  You 1 hour ago (12:26 PM)         CATHRYN I just called the patient to inform her that order was faxed and to call [facility] (phone number provided) and I also called [facility] scheduling department  s/w [place] unfortunately they only schedule echos once a week so patient will be scheduled next Thursday she confirmed auth, order and ins card received- Thank You    Message text       -----------------------------------------  NOTED

## 2022-05-13 NOTE — TELEPHONE ENCOUNTER
Patient Call     You  You; Nolberto Shipman M.D. Just now (12:01 PM)     RT    UPDATE:   Please read. This is an FYI only   Thank you     Message text        You  Berry; Brandi Brown Med Ass't 1 minute ago (12:00 PM)     RT    Thank you Brandi!!!    Message text        Brandi Dahle Med Ass't  You 3 minutes ago (11:58 AM)         Faxed order and authorization to [facility] as Urgent request to [phone] [facility] auth #[id number]. I will scan in media once confirmation of fax goes thru- Thank You    Message text        You  Brandi Brown Med Ass't; Moni Galindo; Moni Stanford 1 hour ago (10:05 AM)     RT    Good morning ladies:   STAT echo ordered by provider YUSRA Issa MD on 5/12/22. Pt is awaiting auth from insurance and has not yet received communication.  Pt is requesting echo be completed at [facility] in [place].     Can you z provide update on PA?   Thank you    Message text        oNlberto Shipman M.D.  You 2 hours ago (9:28 AM)         Trying to catch up here. So patient was admitted, the physicians spoke to CW, he ordered an echo, the insurance wouldn't approve so changed to stat and now ok? Is there anything I should add for patient to get the echo? Thanks Tabitha!    Message text       --------------------------------------    NOTED

## 2022-05-19 ENCOUNTER — TELEPHONE (OUTPATIENT)
Dept: CARDIOLOGY | Facility: MEDICAL CENTER | Age: 62
End: 2022-05-19
Payer: COMMERCIAL

## 2022-05-19 NOTE — TELEPHONE ENCOUNTER
Patient presented to outside hospital for echo we can try to order last week in [place] not able to determine based on echo but heart rate was 140 likely atrial flutter advised to go to the ER in [place] will need work-up there may need transfer as her ejection fraction is 20%.    It is my pleasure to participate in the care of Ms. Montez.  Please do not hesitate to contact me with questions or concerns.    Young Issa MD PhD [facility]  Cardiologist [facility]    Please note that this dictation was created using voice recognition software. There may be errors I did not discover before finalizing the note.     5/19/2022  11:09 AM

## 2022-05-20 ENCOUNTER — TELEPHONE (OUTPATIENT)
Dept: CARDIOLOGY | Facility: MEDICAL CENTER | Age: 62
End: 2022-05-20
Payer: COMMERCIAL

## 2022-05-20 DIAGNOSIS — R07.2 PRECORDIAL CHEST PAIN: ICD-10-CM

## 2022-05-20 DIAGNOSIS — I47.10 SVT (SUPRAVENTRICULAR TACHYCARDIA) (HCC): ICD-10-CM

## 2022-05-20 DIAGNOSIS — I48.0 PAF (PAROXYSMAL ATRIAL FIBRILLATION) (HCC): ICD-10-CM

## 2022-05-20 DIAGNOSIS — I49.1 PAC (PREMATURE ATRIAL CONTRACTION): ICD-10-CM

## 2022-05-20 DIAGNOSIS — I25.10 CORONARY ARTERY DISEASE, NON-OCCLUSIVE: ICD-10-CM

## 2022-05-20 DIAGNOSIS — R00.2 PALPITATIONS: ICD-10-CM

## 2022-05-20 RX ORDER — METOPROLOL SUCCINATE 100 MG/1
100 TABLET, EXTENDED RELEASE ORAL DAILY
Qty: 90 TABLET | Refills: 3 | Status: SHIPPED | OUTPATIENT
Start: 2022-05-20 | End: 2022-10-13 | Stop reason: SDUPTHER

## 2022-05-20 NOTE — TELEPHONE ENCOUNTER
----- Message from Dayami Todd R.N. sent at 5/20/2022  9:47 AM PDT -----  Regarding: FW: ED follow-up for patient who lives near [place]  Last saw VR   ----- Message -----  From: Moni Bledsoe M.D.  Sent: 5/19/2022  10:30 PM PDT  To: Sherry Head, Dayami Todd R.N., #  Subject: ED follow-up for patient who lives near [facility]#    FYI only to Dr. Issa.    -----------------------------------------------------  DAYAMI,  This patient previously saw Dr. House, more recently Dr. Shipman.  She was in the ED in [place] today, Thursday, 5/19/2022 with A. josé with RVR, found to have an EF of 20%.  She converted on her own and had no clinical symptoms of heart failure and opted to go home.    We have escalated her metoprolol succinate from 50 mg to 100 mg.  She is to remain on her Xarelto.  She is to start losartan 12.5 mg tomorrow evening which is not for blood pressure but for low EF.    Please call to see if she has any questions about her medications.  Please order an echocardiogram to be performed in [place] in about 2 to 3 months.  Please instruct her to contact the office for any signs or symptoms of fluid retention/CHF such as worsening shortness of breath, orthopnea, lower extremity edema.    SHERRY,  Please get first available telemedicine appointment for her with any LAKESHA.  Please find out which cardiologist she would like to follow-up with long-term and offer her an in person visit in [place] first available.  Dr. Shipman and Dr. Issa are both familiar with her.    Thank you to both of you,  LS

## 2022-05-20 NOTE — TELEPHONE ENCOUNTER
Pt called clinic.  Pt states metoprolol 100mg not available at pharmacy. Advised pt that I will send over to pharmacy now. Pt states she has enough of the 50mg to double up until pharmacy fills new Rx. Pharmacy verified.  Pt verbalized understanding.       Refill sent to [facility] pharmacy per pt request    metoprolol SR (TOPROL XL) 100 MG TABLET SR 24 HR 90 Tablet 3/3 5/20/2022     Sig - Route: Take 1 Tablet by mouth every day. Please call [phone] to schedule follow up appointment for further refills, thank you. - Oral    Sent to pharmacy as: Metoprolol Succinate  MG Oral Tablet Extended Release 24 Hour (TOPROL XL)    Notes to Pharmacy: Dosage change per LS    Cosign for Ordering: Required by Moni Bledsoe M.D.    E-Prescribing Status: Receipt confirmed by pharmacy (5/20/2022  3:27 PM PDT)    Pharmacy  [id number] - [place], [place] - [address].         -----------------------------------  LS note dated 5/20/22:    We have escalated her metoprolol succinate from 50 mg to 100 mg.  She is to remain on her Xarelto.  She is to start losartan 12.5 mg tomorrow evening which is not for blood pressure but for low EF.

## 2022-05-20 NOTE — TELEPHONE ENCOUNTER
Called pt [phone]  Pt states she's feeling better. Needs to  new medications from pharmacy. Pt will call pharmacy to verify if new Rx's are there, if not pt will call me back [phone] so RN can send new Rx's for pt. Pharmacy verified. Pt requests repeat echo be faxed to [facility]. FU VV with DB 6/8/22 verified with pt. Advised pt to call office with any s/s of CHF, swelling, SOB. Advised pt to keep daily BP/HR log for better telephone triage. Advised pt to call clinic with any other questions. Pt verbalized understanding.       Awaiting return phone call from pt regarding new Rx's

## 2022-05-20 NOTE — TELEPHONE ENCOUNTER
Pt returned call.  Pt reports both Rx's are at pharmacy and ready for  in 1 hour. Advised pt to start medication TODAY. Pt verbalized understanding. Explained to call clinic after hours and weekends as there is an on-call provider available. Pt verbalized understanding            To VR as CATHRYN     Repeat echo order placed per SC  Faxed to [facility] [phone]  Confirmation status received  Scan to [facility]

## 2022-05-20 NOTE — TELEPHONE ENCOUNTER
Dr. Ángel Hill called back from [facility].  Patient converted on her own after IV diltiazem.    Patient's home medications include Xarelto and metoprolol succinate 50 mg daily.    Although patient's echo today shows an EF of 20% and A. fib with RVR, patient is hemodynamically stable, breathing comfortable and no evidence of heart failure.    Patient prefers to be discharged home in [place] and not be transferred to [facility].    Patient will be given extra dose of metoprolol tartrate prior to discharge tonight, will start metoprolol succinate 100 mg tomorrow morning which is an increase from her 50 mg.  She will also begin losartan 12.5 mg tomorrow evening.    We will reevaluate in the office for spironolactone.    We will arrange for follow-up echocardiogram at a short interval to ensure that her EF is improving, otherwise she would be a candidate for amiodarone.    I will request one of our nursing staff to reach out to her tomorrow to monitor her heart rate and request first available telemedicine visit or in person in [place].

## 2022-06-08 ENCOUNTER — TELEMEDICINE (OUTPATIENT)
Dept: CARDIOLOGY | Facility: MEDICAL CENTER | Age: 62
End: 2022-06-08
Payer: COMMERCIAL

## 2022-06-08 VITALS
DIASTOLIC BLOOD PRESSURE: 62 MMHG | BODY MASS INDEX: 34.07 KG/M2 | SYSTOLIC BLOOD PRESSURE: 110 MMHG | HEIGHT: 69 IN | HEART RATE: 55 BPM | WEIGHT: 230 LBS

## 2022-06-08 DIAGNOSIS — E78.5 DYSLIPIDEMIA: ICD-10-CM

## 2022-06-08 DIAGNOSIS — R00.2 PALPITATIONS: ICD-10-CM

## 2022-06-08 DIAGNOSIS — I48.0 PAF (PAROXYSMAL ATRIAL FIBRILLATION) (HCC): ICD-10-CM

## 2022-06-08 DIAGNOSIS — I10 ESSENTIAL HYPERTENSION: ICD-10-CM

## 2022-06-08 DIAGNOSIS — E11.9 TYPE 2 DIABETES MELLITUS WITHOUT COMPLICATION, WITHOUT LONG-TERM CURRENT USE OF INSULIN (HCC): ICD-10-CM

## 2022-06-08 DIAGNOSIS — I47.10 SVT (SUPRAVENTRICULAR TACHYCARDIA) (HCC): ICD-10-CM

## 2022-06-08 DIAGNOSIS — I49.1 PAC (PREMATURE ATRIAL CONTRACTION): ICD-10-CM

## 2022-06-08 DIAGNOSIS — I25.10 CORONARY ARTERY DISEASE, NON-OCCLUSIVE: ICD-10-CM

## 2022-06-08 DIAGNOSIS — R06.83 SNORES: ICD-10-CM

## 2022-06-08 PROCEDURE — 99214 OFFICE O/P EST MOD 30 MIN: CPT | Mod: 95 | Performed by: NURSE PRACTITIONER

## 2022-06-08 RX ORDER — LOSARTAN POTASSIUM 25 MG/1
12.5 TABLET ORAL DAILY
COMMUNITY
Start: 2022-05-20 | End: 2022-07-19

## 2022-06-08 ASSESSMENT — ENCOUNTER SYMPTOMS
CHILLS: 0
HEADACHES: 0
FEVER: 0
PALPITATIONS: 0
HEMOPTYSIS: 0
WHEEZING: 0
SPUTUM PRODUCTION: 0
SHORTNESS OF BREATH: 0
COUGH: 0
ORTHOPNEA: 0
DIZZINESS: 0
CLAUDICATION: 0
PND: 0
VOMITING: 0
NAUSEA: 0

## 2022-06-08 NOTE — PROGRESS NOTES
"Telemedicine: New Patient   This evaluation was conducted via Zoom using secure and encrypted videoconferencing technology. The patient was in their home in the state of [place].    The patient's identity was confirmed and verbal consent was obtained for this virtual visit.    Subjective:     CC:   Chief Complaint   Patient presents with   • Coronary Artery Disease   • Atrial Fibrillation   • Supraventricular Tachycardia (SVT)       Gisel Montez is a 61 y.o. female presenting to establish care and to discuss the evaluation and management of: Atrial fibrillation    Patient was seen in the emergency department 5/19/2022 at [facility] for atrial fibrillation with RVR.  Patient was going to be transferred to [facility] however self converted to sinus rhythm.  She was given an extra dose of metoprolol and increased her metoprolol succinate to 100 mg daily.  Patient is compliant with losartan 12.5 mg daily, metoprolol  mg daily, rivaroxaban 20 mg daily, and metformin 500 mg daily.  Patient has no issues with bleeding being on rivaroxaban.      ROS  Patient denies chest pain, orthopnea, lower extremity edema, cough, and shortness of breath.    Allergies   Allergen Reactions   • Other Food      \"CELERY\". Became violently ill, tongue swelled and hard to breath.   • Doxycycline        Current medicines (including changes today)  Current Outpatient Medications   Medication Sig Dispense Refill   • losartan (COZAAR) 25 MG Tab Take 12.5 mg by mouth every day.     • metoprolol SR (TOPROL XL) 100 MG TABLET SR 24 HR Take 1 Tablet by mouth every day. Please call [phone] to schedule follow up appointment for further refills, thank you. 90 Tablet 3   • rivaroxaban (XARELTO) 20 MG Tab tablet Take 1 Tablet by mouth with dinner. 90 Tablet 3   • levothyroxine (SYNTHROID) 112 MCG Tab Take 1 Tab by mouth every morning before breakfast. 30 Tab 2   • metFORMIN (GLUCOPHAGE) 500 MG Tab TAKE ONE TABLET BY MOUTH EVERY DAY (Patient " "taking differently: 2 times a day.) 30 Tab 5   • CINNAMON PO Take 2 Capsule by mouth every day.     • EPIPEN 2-YVROSE 0.3 MG/0.3ML Solution Auto-injector solution for injection      • vitamin D (CHOLECALCIFEROL) 1000 UNIT TABS Take 2,000 Units by mouth every day.       No current facility-administered medications for this visit.       She  has a past medical history of ACTIVE SMOKER ( ), Acute bronchitis due to infection (10/3/2016), Acute MI, inferior wall ([facility]) ( ), CAD (coronary artery disease) ( ), Cardiomyopathy ([facility]), Class 2 obesity due to excess calories without serious comorbidity in adult (9/10/2018), Hyperglycemia (10/31/2016), Hypothyroidism ( ), PAF (paroxysmal atrial fibrillation) ([facility]) ( ), Palpitations ( ), Type 2 diabetes mellitus without complication ([facility]) (8/28/2017), and Vitamin D deficiency (2/27/2017).  She  has a past surgical history that includes gyn surgery and other.      Family History   Problem Relation Age of Onset   • Diabetes Father 68     Family Status   Relation Name Status   • Fa  (Not Specified)       Patient Active Problem List    Diagnosis Date Noted   • Bacterial conjunctivitis 02/10/2020   • History of non-ST elevation myocardial infarction (NSTEMI) 08/09/2019   • Class 2 obesity due to excess calories without serious comorbidity in adult 09/10/2018   • Type 2 diabetes mellitus without complication ([facility]) 08/28/2017   • Palpitations 05/05/2017   • Vitamin D deficiency 02/27/2017   • Coronary artery disease, non-occlusive 12/16/2016   • Dyslipidemia 12/16/2016   • Hyperglycemia 10/31/2016   • Hypothyroidism 06/06/2016   • SVT (supraventricular tachycardia) ([facility]) 10/10/2014   • Sleep disorder 06/20/2014   • PAF (paroxysmal atrial fibrillation) ([facility]) 12/13/2013   • Obese 12/13/2013          Objective:   /62 (BP Location: Right arm, Patient Position: Sitting, BP Cuff Size: Adult)   Pulse (!) 55   Ht 1.753 m (5' 9\")   Wt 104 kg (230 lb)   BMI 33.97 kg/m²     Physical " Exam  Vitals and nursing note reviewed.   Constitutional:       Appearance: Normal appearance.   HENT:      Head: Normocephalic and atraumatic.   Pulmonary:      Effort: Pulmonary effort is normal.   Musculoskeletal:         General: Normal range of motion.      Cervical back: Normal range of motion and neck supple.   Skin:     General: Skin is warm and dry.   Neurological:      General: No focal deficit present.      Mental Status: She is alert and oriented to person, place, and time.   Psychiatric:         Mood and Affect: Mood normal.         Behavior: Behavior normal.         Thought Content: Thought content normal.         Judgment: Judgment normal.           Assessment and Plan:   The following treatment plan was discussed:     1. PAF (paroxysmal atrial fibrillation) ([facility])    2. SVT (supraventricular tachycardia) ([facility])    3. PAC (premature atrial contraction)    4. Palpitations    5. Dyslipidemia    6. Coronary artery disease, non-occlusive    7. Essential hypertension    8. Type 2 diabetes mellitus without complication, without long-term current use of insulin ([facility])    Other orders  - losartan (COZAAR) 25 MG Tab; Take 12.5 mg by mouth every day.        Follow-up: Return in about 1 month (around 7/8/2022).       Patient reports that her palpitations are happening more at night.  She feels she is going in and out of atrial fibrillation.  Will refer her to sleep medicine for further evaluation of ROHIT.  Patient snores but does not have witnessed apneas.  She has some daytime somnolence and wakes up approximately 1 time per night for nocturia.    She will follow-up with Dr. Issa and depending on her blood pressures and heart rate may start spironolactone for heart failure treatment.  Patient reports having blood pressures  over 40-60s.  She was somewhat lightheaded with SBP in the 80s.  We will have her check blood pressures 2 hours after taking all of her heart failure medications and see if she has room  to start spironolactone.

## 2022-06-08 NOTE — PROGRESS NOTES
No chief complaint on file.      Subjective     Gisel Montez is a 61 y.o. female who presents today     Past Medical History:   Diagnosis Date   • ACTIVE SMOKER      4-10 sticks/40   • Acute bronchitis due to infection 10/3/2016   • Acute MI, inferior wall ([facility])     • CAD (coronary artery disease)     • Cardiomyopathy ([facility])    • Class 2 obesity due to excess calories without serious comorbidity in adult 9/10/2018   • Hyperglycemia 10/31/2016   • Hypothyroidism     • PAF (paroxysmal atrial fibrillation) ([facility])     • Palpitations     • Type 2 diabetes mellitus without complication ([facility]) 2017   • Vitamin D deficiency 2017     Past Surgical History:   Procedure Laterality Date   • GYN SURGERY      tubal ligation   • OTHER      choleycystectomy     Family History   Problem Relation Age of Onset   • Diabetes Father 68     Social History     Socioeconomic History   • Marital status:      Spouse name: Not on file   • Number of children: Not on file   • Years of education: Not on file   • Highest education level: Not on file   Occupational History   • Occupation: other     Comment: [employer]   Tobacco Use   • Smoking status: Former Smoker     Packs/day: 0.50     Years: 35.00     Pack years: 17.50     Types: Cigarettes     Quit date: 2015     Years since quittin.0   • Smokeless tobacco: Never Used   Vaping Use   • Vaping Use: Never used   Substance and Sexual Activity   • Alcohol use: Yes     Alcohol/week: 3.6 oz     Types: 2 Glasses of wine, 4 Cans of beer per week     Comment: one/week   • Drug use: No   • Sexual activity: Yes     Partners: Male   Other Topics Concern   • Not on file   Social History Narrative    . Lives in [place], [place]. Works for [employer].     Social Determinants of Health     Financial Resource Strain: Not on file   Food Insecurity: Not on file   Transportation Needs: Not on file   Physical Activity: Not on file   Stress: Not on file   Social Connections: Not on file  "  Intimate Partner Violence: Not on file   Housing Stability: Not on file     Allergies   Allergen Reactions   • Other Food      \"CELERY\". Became violently ill, tongue swelled and hard to breath.   • Doxycycline      Outpatient Encounter Medications as of 6/8/2022   Medication Sig Dispense Refill   • metoprolol SR (TOPROL XL) 100 MG TABLET SR 24 HR Take 1 Tablet by mouth every day. Please call [phone] to schedule follow up appointment for further refills, thank you. 90 Tablet 3   • rivaroxaban (XARELTO) 20 MG Tab tablet Take 1 Tablet by mouth with dinner. 90 Tablet 3   • levothyroxine (SYNTHROID) 112 MCG Tab Take 1 Tab by mouth every morning before breakfast. 30 Tab 2   • metFORMIN (GLUCOPHAGE) 500 MG Tab TAKE ONE TABLET BY MOUTH EVERY DAY (Patient taking differently: 2 times a day.) 30 Tab 5   • CINNAMON PO Take 2 Capsule by mouth every day.     • EPIPEN 2-YVROSE 0.3 MG/0.3ML Solution Auto-injector solution for injection      • vitamin D (CHOLECALCIFEROL) 1000 UNIT TABS Take 2,000 Units by mouth every day.       No facility-administered encounter medications on file as of 6/8/2022.     Review of Systems   Constitutional: Negative for chills and fever.   Respiratory: Negative for cough, hemoptysis, sputum production, shortness of breath and wheezing.    Cardiovascular: Negative for chest pain, palpitations, orthopnea, claudication, leg swelling and PND.   Gastrointestinal: Negative for nausea and vomiting.   Neurological: Negative for dizziness and headaches.   All other systems reviewed and are negative.             Objective     There were no vitals taken for this visit.    Physical Exam  Vitals and nursing note reviewed.   Constitutional:       Appearance: She is well-developed.   Neck:      Vascular: No JVD.   Cardiovascular:      Rate and Rhythm: Normal rate and regular rhythm.      Heart sounds: Normal heart sounds. No murmur heard.  Pulmonary:      Effort: Pulmonary effort is normal.      Breath sounds: " Normal breath sounds.   Abdominal:      Palpations: Abdomen is soft.   Musculoskeletal:      Cervical back: Neck supple.   Skin:     General: Skin is warm and dry.   Neurological:      Comments: Cranial nerves II-XII WNL   Psychiatric:         Behavior: Behavior normal.         Thought Content: Thought content normal.         Judgment: Judgment normal.                Assessment & Plan     1. PAF (paroxysmal atrial fibrillation) ([facility])     2. SVT (supraventricular tachycardia) ([facility])     3. PAC (premature atrial contraction)     4. Palpitations     5. Dyslipidemia     6. Coronary artery disease, non-occlusive     7. Essential hypertension     8. Type 2 diabetes mellitus without complication, without long-term current use of insulin ([facility])         Medical Decision Making: Today's Assessment/Status/Plan:

## 2022-06-23 LAB — HBA1C MFR BLD: 6.2 % (ref 0–5.6)

## 2022-07-08 ENCOUNTER — TELEPHONE (OUTPATIENT)
Dept: CARDIOLOGY | Facility: MEDICAL CENTER | Age: 62
End: 2022-07-08
Payer: COMMERCIAL

## 2022-07-08 NOTE — TELEPHONE ENCOUNTER
Spoke to patient in regards to upcoming appt with CW. Patient stated she had a recent echocardiogram done at [facility]. Records requested from [facility] at fax #[phone]. Patient also stated she had recent lab work done through her PCP. Records requested from [facility] at fax # [phone]. Fax confirmations received. Records pending.

## 2022-07-19 ENCOUNTER — OFFICE VISIT (OUTPATIENT)
Dept: CARDIOLOGY | Facility: PHYSICIAN GROUP | Age: 62
End: 2022-07-19
Payer: COMMERCIAL

## 2022-07-19 VITALS
SYSTOLIC BLOOD PRESSURE: 86 MMHG | BODY MASS INDEX: 34.66 KG/M2 | DIASTOLIC BLOOD PRESSURE: 68 MMHG | HEART RATE: 118 BPM | OXYGEN SATURATION: 95 % | WEIGHT: 234 LBS | HEIGHT: 69 IN | RESPIRATION RATE: 18 BRPM

## 2022-07-19 DIAGNOSIS — Z79.01 CHRONIC ANTICOAGULATION: Chronic | ICD-10-CM

## 2022-07-19 DIAGNOSIS — I48.0 PAF (PAROXYSMAL ATRIAL FIBRILLATION) (HCC): ICD-10-CM

## 2022-07-19 DIAGNOSIS — I42.8 OTHER CARDIOMYOPATHY (HCC): Chronic | ICD-10-CM

## 2022-07-19 DIAGNOSIS — I48.19 PERSISTENT ATRIAL FIBRILLATION (HCC): ICD-10-CM

## 2022-07-19 DIAGNOSIS — I25.10 CORONARY ARTERY DISEASE, NON-OCCLUSIVE: ICD-10-CM

## 2022-07-19 DIAGNOSIS — I25.2 HISTORY OF NON-ST ELEVATION MYOCARDIAL INFARCTION (NSTEMI): ICD-10-CM

## 2022-07-19 DIAGNOSIS — I47.10 SVT (SUPRAVENTRICULAR TACHYCARDIA) (HCC): ICD-10-CM

## 2022-07-19 PROCEDURE — 99215 OFFICE O/P EST HI 40 MIN: CPT | Performed by: INTERNAL MEDICINE

## 2022-07-19 RX ORDER — AMIODARONE HYDROCHLORIDE 200 MG/1
200 TABLET ORAL DAILY
Qty: 90 TABLET | Refills: 3 | Status: SHIPPED | OUTPATIENT
Start: 2022-07-19 | End: 2023-06-30

## 2022-07-19 ASSESSMENT — ENCOUNTER SYMPTOMS
NAUSEA: 0
DIZZINESS: 0
PND: 0
BRUISES/BLEEDS EASILY: 0
WEAKNESS: 0
FALLS: 0
COUGH: 0
SORE THROAT: 0
CHILLS: 0
BLURRED VISION: 0
FEVER: 0
FOCAL WEAKNESS: 0
SHORTNESS OF BREATH: 0
CLAUDICATION: 0
PALPITATIONS: 0
ABDOMINAL PAIN: 0

## 2022-07-20 NOTE — PROGRESS NOTES
Chief Complaint   Patient presents with   • Coronary Artery Disease     F/V Dx: Coronary artery disease, non-occlusive   • Atrial Fibrillation     F/V Dx: PAF (paroxysmal atrial fibrillation) ([facility])   • Palpitations       Subjective     Gisel Montez is a 61 y.o. female who presents today with recent finding of A. fib and reduced ejection fraction on echocardiogram she is given metoprolol and reverted to sinus rhythm before needing cardioversion she believes she shortly reverted back to arrhythmia and had relative tachycardia especially with ambulation she is also had some dizziness with relative hypotension on the low-dose losartan and increased dose of metoprolol.  She and her anything about the sleep medicine referral.  She is tolerating anticoagulation well with no missed doses.    Past Medical History:   Diagnosis Date   • ACTIVE SMOKER      4-10 sticks/40   • Acute bronchitis due to infection 10/3/2016   • Acute MI, inferior wall ([facility])     • CAD (coronary artery disease)     • Cardiomyopathy ([facility])    • Class 2 obesity due to excess calories without serious comorbidity in adult 9/10/2018   • Hyperglycemia 10/31/2016   • Hypothyroidism     • PAF (paroxysmal atrial fibrillation) ([facility])     • Palpitations     • Type 2 diabetes mellitus without complication ([facility]) 8/28/2017   • Vitamin D deficiency 2/27/2017     Past Surgical History:   Procedure Laterality Date   • GYN SURGERY      tubal ligation   • OTHER      choleycystectomy     Family History   Problem Relation Age of Onset   • Diabetes Father 68     Social History     Socioeconomic History   • Marital status:      Spouse name: Not on file   • Number of children: Not on file   • Years of education: Not on file   • Highest education level: Not on file   Occupational History   • Occupation: other     Comment: [employer]   Tobacco Use   • Smoking status: Former Smoker     Packs/day: 0.50     Years: 35.00     Pack years: 17.50     Types: Cigarettes     Quit date:  "2015     Years since quittin.1   • Smokeless tobacco: Never Used   • Tobacco comment: occ vape   Vaping Use   • Vaping Use: Some days   Substance and Sexual Activity   • Alcohol use: Yes     Alcohol/week: 3.6 oz     Types: 2 Glasses of wine, 4 Cans of beer per week     Comment: on weekends   • Drug use: No   • Sexual activity: Yes     Partners: Male   Other Topics Concern   • Not on file   Social History Narrative    . Lives in [place], [place]. Works for [employer].     Social Determinants of Health     Financial Resource Strain: Not on file   Food Insecurity: Not on file   Transportation Needs: Not on file   Physical Activity: Not on file   Stress: Not on file   Social Connections: Not on file   Intimate Partner Violence: Not on file   Housing Stability: Not on file     Allergies   Allergen Reactions   • Other Food      \"CELERY\". Became violently ill, tongue swelled and hard to breath.   • Doxycycline      Outpatient Encounter Medications as of 2022   Medication Sig Dispense Refill   • Multiple Vitamins-Minerals (HAIR SKIN NAILS PO) Take  by mouth every day.     • amiodarone (CORDARONE) 200 MG Tab Take 1 Tablet by mouth every day. 90 Tablet 3   • metoprolol SR (TOPROL XL) 100 MG TABLET SR 24 HR Take 1 Tablet by mouth every day. Please call [phone] to schedule follow up appointment for further refills, thank you. 90 Tablet 3   • rivaroxaban (XARELTO) 20 MG Tab tablet Take 1 Tablet by mouth with dinner. 90 Tablet 3   • levothyroxine (SYNTHROID) 112 MCG Tab Take 1 Tab by mouth every morning before breakfast. 30 Tab 2   • metFORMIN (GLUCOPHAGE) 500 MG Tab TAKE ONE TABLET BY MOUTH EVERY DAY (Patient taking differently: 2 times a day.) 30 Tab 5   • CINNAMON PO Take 2 Capsule by mouth every day.     • EPIPEN 2-YVROSE 0.3 MG/0.3ML Solution Auto-injector solution for injection      • vitamin D (CHOLECALCIFEROL) 1000 UNIT TABS Take 2,000 Units by mouth every day.     • [DISCONTINUED] losartan (COZAAR) " "25 MG Tab Take 12.5 mg by mouth every day.       No facility-administered encounter medications on file as of 7/19/2022.     Review of Systems   Constitutional: Negative for chills and fever.   HENT: Negative for sore throat.    Eyes: Negative for blurred vision.   Respiratory: Negative for cough and shortness of breath.    Cardiovascular: Negative for chest pain, palpitations, claudication, leg swelling and PND.   Gastrointestinal: Negative for abdominal pain and nausea.   Musculoskeletal: Negative for falls and joint pain.   Skin: Negative for rash.   Neurological: Negative for dizziness, focal weakness and weakness.   Endo/Heme/Allergies: Does not bruise/bleed easily.              Objective     BP (!) 86/68 (BP Location: Left arm, Patient Position: Sitting, BP Cuff Size: Adult)   Pulse (!) 118   Resp 18   Ht 1.753 m (5' 9\")   Wt 106 kg (234 lb)   SpO2 95%   BMI 34.56 kg/m²     Physical Exam  Constitutional:       General: She is not in acute distress.     Appearance: She is not diaphoretic.   Eyes:      General: No scleral icterus.  Neck:      Vascular: No JVD.   Cardiovascular:      Rate and Rhythm: Tachycardia present. Rhythm irregular.      Heart sounds: Normal heart sounds. No murmur heard.    No friction rub. No gallop.   Pulmonary:      Effort: No respiratory distress.      Breath sounds: No wheezing or rales.   Abdominal:      General: Bowel sounds are normal.      Palpations: Abdomen is soft.   Skin:     Findings: No rash.   Neurological:      Mental Status: She is alert.            We reviewed in person the most recent labs  Recent Results (from the past 5040 hour(s))   HEMOGLOBIN A1C    Collection Time: 06/23/22 12:00 AM   Result Value Ref Range    Glycohemoglobin 6.2 (A) 0.0 - 5.6 %     Extensive labs and testing from [place] reviewed EF 20% with rapid A. fib in recent echocardiogram lab test EKG and other testing reviewed from [facility]    Assessment & Plan     1. PAF (paroxysmal " atrial fibrillation) ([facility])  Cardiac Event Monitor    EC-ECHOCARDIOGRAM COMPLETE W/O CONT    Referral to Pulmonary and Sleep Medicine   2. Coronary artery disease, non-occlusive     3. History of non-ST elevation myocardial infarction (NSTEMI)     4. SVT (supraventricular tachycardia) ([facility])     5. Chronic anticoagulation     6. Other cardiomyopathy ([facility])         Medical Decision Making: Today's Assessment/Status/Plan:       It was my pleasure to meet with Ms. Montez.    We addressed the management of hypertension at today's visit. Blood pressure is well controlled.  We specifically assessed the labs on hypertension treatment  Her blood pressure is low we will have her stop her losartan    We addressed the management of congestive heart failure with reduced ejection fraction .  She is on proper mineralacorticoid, angiotensin/ace inhibition with neprilysin inhibition, SGLTs inhibitor and beta-blockers as appropriate.  We addressed the potential side effects and regular laboratory follow-up for these medications.  Right now she is unable to tolerate the metoprolol which is providing inadequate rhythm control and rate control I added amiodarone low dose which she will take for 2 to 3 weeks if she does not revert to sinus rhythm we will do a outpatient cardioversion.  Given that she has to travel some distance we will have her see the EP service the day before to discuss ablation which is likely the preferred treatment given her young age with A. fib causing cardiomyopathy.    We will have her get a Holter monitor and monitor her heart rhythm as best as we can in the role setting she does have a Kardia device that she can monitor as well.    We will get an echocardiogram in 2 to 3 months after hopeful rhythm control in [place]    We addressed the management of chronic anticoagulation at today's visit. She understands the risks and benefits of chronic anticoagulation.  We reviewed and verified the results of annual  testing for anemia and kidney function.      I will see Ms. Montez back in 3 months time and encouraged her to follow up with us over the phone or electronically using my MyChart as issues arise.    It is my pleasure to participate in the care of Ms. Montez.  Please do not hesitate to contact me with questions or concerns.    Young Issa MD PhD [facility]  Cardiologist [facility]    Please note that this dictation was created using voice recognition software. There may be errors I did not discover before finalizing the note.       Ms. Montez's care is highly complex due to high risk diagnosis with either severe exacerbation, progression, or side effects of treatment. We specifically discussed the need for high risk medication requiring at least quarterly testing and/or made a decision on elective/emergent major surgery with identified patient or procedure risk factors specific to Ms. Montez. I have personally spent extra time in discussion about these facts with Ms. Montez and reviewed and or ordered at least 3 tests, documents or other physician/LAKESHA reports available including labs, imaging and EKGs as appropriate separate from today's encounter.  I have reviewed images with Ms. Montez and personally interpreted on this encounter day the referenced EKG, echocardiogram, stress tests, CT scan, cardiac catheterization or other cardiac imaging and my personal interpretation is what is specifically stated in this note.

## 2022-07-21 ENCOUNTER — TELEPHONE (OUTPATIENT)
Dept: CARDIOLOGY | Facility: MEDICAL CENTER | Age: 62
End: 2022-07-21
Payer: COMMERCIAL

## 2022-07-21 NOTE — TELEPHONE ENCOUNTER
----- Message from Young Issa M.D. sent at 7/20/2022  8:11 PM PDT -----  Regarding: RE: DCCV in 3 weeks with EP appt the day before  She should wait that long so lets just do the DCCV in 2-3 weeks with anesthesia and any one of us, she might revert with amio so perhaps we can arrange a confirmatory EKG in [facility] the day before with telemed or if she has the biotel confirm that way  ----- Message -----  From: Ronnie Lowery  Sent: 7/20/2022   3:33 PM PDT  To: Young Issa M.D.  Subject: RE: DCCV in 3 weeks with EP appt the day bef#    We have no openings for EP until the end of August right now.  ----- Message -----  From: Young Issa M.D.  Sent: 7/19/2022   5:15 PM PDT  To: Mary Flores, Med Ass't, #  Subject: DCCV in 3 weeks with EP appt the day before

## 2022-07-21 NOTE — TELEPHONE ENCOUNTER
Patient is scheduled on 8-11-22 for a Cardioversion w/anesthesia with . Patient was told to hold metformin AM day of procedure and to check in at 11:00 for a 1:00 procedure. Updated H&P to be done on admit by NP. Pre admit to call and do a telephone pre admit due to patient living out of area. Sent message to Rubina YEAGER to have her order an EKG for patient to have done in [place] day before her CV.

## 2022-07-25 ENCOUNTER — TELEPHONE (OUTPATIENT)
Dept: CARDIOLOGY | Facility: MEDICAL CENTER | Age: 62
End: 2022-07-25
Payer: COMMERCIAL

## 2022-07-25 ENCOUNTER — PRE-ADMISSION TESTING (OUTPATIENT)
Dept: ADMISSIONS | Facility: MEDICAL CENTER | Age: 62
End: 2022-07-25
Attending: INTERNAL MEDICINE
Payer: COMMERCIAL

## 2022-07-25 DIAGNOSIS — I48.19 PERSISTENT ATRIAL FIBRILLATION (HCC): ICD-10-CM

## 2022-07-25 RX ORDER — CETIRIZINE HYDROCHLORIDE 10 MG/1
10 TABLET ORAL DAILY
COMMUNITY
End: 2022-12-01

## 2022-07-25 NOTE — TELEPHONE ENCOUNTER
Attempted to fax ekg order to [phone], 3rd and 4th time, undelivered status received.     Task deferred to MADDI BURTON to return call to request for alternative fax number.  Received notification alternative number [phone] and received completed status.  Confirmation sent to [facility] to scan for reference.

## 2022-07-25 NOTE — TELEPHONE ENCOUNTER
----- Message from Ronnie Lowery sent at 7/21/2022 10:46 AM PDT -----  Regarding: EKG needs to be order in [place]  Please see Dr. Issa's note for patient to have EKG done in [place] day before CV. Please call patient to get that scheduled for her to have done in [place] the day before procedure on 8-11-22.  ----- Message -----  From: Young Issa M.D.  Sent: 7/20/2022   8:12 PM PDT  To: Ronnie Lowery  Subject: RE: DCCV in 3 weeks with EP appt the day bef#    She should wait that long so lets just do the DCCV in 2-3 weeks with anesthesia and any one of us, she might revert with amio so perhaps we can arrange a confirmatory EKG in [facility] the day before with telemed or if she has the biotel confirm that way  ----- Message -----  From: Ronnie Lowery  Sent: 7/20/2022   3:33 PM PDT  To: Young Issa M.D.  Subject: RE: DCCV in 3 weeks with EP appt the day bef#    We have no openings for EP until the end of August right now.  ----- Message -----  From: Young Issa M.D.  Sent: 7/19/2022   5:15 PM PDT  To: Mary Flores, Med Ass't, #  Subject: DCCV in 3 weeks with EP appt the day before

## 2022-07-25 NOTE — TELEPHONE ENCOUNTER
"Reviewed MD recommendations.  Upon chart review, pt currently scheduled for biotel monitor mail out and enrollment 8/1/2022.  S/w DS, holter monitor technician regarding pt testing who states pt insurance may not approve live monitor.  Noted findings.    Called pt to review findings.  Received contact information for [facility], [phone], she states she had EKG completed at their facility or the next available is in [place] 100+ miles away.  Reassurance given this RN will reach out to [facility] to have EKG scheduled for 8/10.  Gisel states seen being on Amiodarone, \"It seems to be working HR is down and BP has come up.\"  Reassurance given to pt to reach out to the office should she have concerns or questions prior to cardioversion.  Pt verbalizes understanding and states no other concerns or questions at this time.  Pt is appreciative of information given.    Called [facility] and s/w Radha, received fax number [phone].  She states she will reach out to pt to schedule.      EKG ordered and faxed to above number, undelivered response received.    2nd attempt to fax to above number, undelivered response received.    Will attempt to refax at a later time.    "

## 2022-08-01 ENCOUNTER — NON-PROVIDER VISIT (OUTPATIENT)
Dept: CARDIOLOGY | Facility: MEDICAL CENTER | Age: 62
End: 2022-08-01
Attending: INTERNAL MEDICINE
Payer: COMMERCIAL

## 2022-08-01 DIAGNOSIS — I49.3 PVC'S (PREMATURE VENTRICULAR CONTRACTIONS): ICD-10-CM

## 2022-08-01 DIAGNOSIS — I48.0 PAF (PAROXYSMAL ATRIAL FIBRILLATION) (HCC): ICD-10-CM

## 2022-08-01 DIAGNOSIS — I44.0 FIRST DEGREE HEART BLOCK: ICD-10-CM

## 2022-08-01 DIAGNOSIS — I49.1 APC (ATRIAL PREMATURE CONTRACTIONS): ICD-10-CM

## 2022-08-01 NOTE — PROGRESS NOTES
Home enrollment completed in the 30 day [employer] heart monitoring program, per Young Issa M.D.  Monitor will be shipped to patient via [employer].  >Pending Baseline.  >Pending EOS.

## 2022-08-09 ENCOUNTER — TELEPHONE (OUTPATIENT)
Dept: CARDIOLOGY | Facility: MEDICAL CENTER | Age: 62
End: 2022-08-09

## 2022-08-09 NOTE — TELEPHONE ENCOUNTER
CW      Caller: Gisel Montez    Topic/issue: Patient was calling to let us know that she had not received her BIO TEL monitor in the mail from us. She stated that we were supposed to send it by [employer] since she doesn't get home delivery  Callback Number: [phone] (home)       Thank you    -Tommy VERAS

## 2022-08-11 ENCOUNTER — ANESTHESIA (OUTPATIENT)
Dept: CARDIOLOGY | Facility: MEDICAL CENTER | Age: 62
End: 2022-08-11
Payer: COMMERCIAL

## 2022-08-11 ENCOUNTER — TELEPHONE (OUTPATIENT)
Dept: CARDIOLOGY | Facility: MEDICAL CENTER | Age: 62
End: 2022-08-11

## 2022-08-11 ENCOUNTER — APPOINTMENT (OUTPATIENT)
Dept: CARDIOLOGY | Facility: MEDICAL CENTER | Age: 62
End: 2022-08-11
Attending: INTERNAL MEDICINE
Payer: COMMERCIAL

## 2022-08-11 ENCOUNTER — HOSPITAL ENCOUNTER (OUTPATIENT)
Facility: MEDICAL CENTER | Age: 62
End: 2022-08-11
Attending: INTERNAL MEDICINE | Admitting: INTERNAL MEDICINE
Payer: COMMERCIAL

## 2022-08-11 ENCOUNTER — ANESTHESIA EVENT (OUTPATIENT)
Dept: CARDIOLOGY | Facility: MEDICAL CENTER | Age: 62
End: 2022-08-11
Payer: COMMERCIAL

## 2022-08-11 VITALS
DIASTOLIC BLOOD PRESSURE: 68 MMHG | BODY MASS INDEX: 33.96 KG/M2 | WEIGHT: 229.28 LBS | HEART RATE: 62 BPM | HEIGHT: 69 IN | OXYGEN SATURATION: 94 % | SYSTOLIC BLOOD PRESSURE: 117 MMHG | RESPIRATION RATE: 18 BRPM | TEMPERATURE: 97.8 F

## 2022-08-11 DIAGNOSIS — I48.19 PERSISTENT ATRIAL FIBRILLATION (HCC): ICD-10-CM

## 2022-08-11 DIAGNOSIS — I47.10 SVT (SUPRAVENTRICULAR TACHYCARDIA) (HCC): ICD-10-CM

## 2022-08-11 DIAGNOSIS — I48.0 PAF (PAROXYSMAL ATRIAL FIBRILLATION) (HCC): ICD-10-CM

## 2022-08-11 LAB
ANION GAP SERPL CALC-SCNC: 12 MMOL/L (ref 7–16)
BUN SERPL-MCNC: 12 MG/DL (ref 8–22)
CALCIUM SERPL-MCNC: 9.6 MG/DL (ref 8.5–10.5)
CHLORIDE SERPL-SCNC: 105 MMOL/L (ref 96–112)
CO2 SERPL-SCNC: 20 MMOL/L (ref 20–33)
CREAT SERPL-MCNC: 0.66 MG/DL (ref 0.5–1.4)
EKG IMPRESSION: NORMAL
EKG IMPRESSION: NORMAL
ERYTHROCYTE [DISTWIDTH] IN BLOOD BY AUTOMATED COUNT: 45.8 FL (ref 35.9–50)
GFR SERPLBLD CREATININE-BSD FMLA CKD-EPI: 100 ML/MIN/1.73 M 2
GLUCOSE BLD STRIP.AUTO-MCNC: 107 MG/DL (ref 65–99)
GLUCOSE SERPL-MCNC: 109 MG/DL (ref 65–99)
HCT VFR BLD AUTO: 47.5 % (ref 37–47)
HGB BLD-MCNC: 16.1 G/DL (ref 12–16)
INR PPP: 1.68 (ref 0.87–1.13)
MCH RBC QN AUTO: 30.7 PG (ref 27–33)
MCHC RBC AUTO-ENTMCNC: 33.9 G/DL (ref 33.6–35)
MCV RBC AUTO: 90.5 FL (ref 81.4–97.8)
PLATELET # BLD AUTO: 171 K/UL (ref 164–446)
PMV BLD AUTO: 11.3 FL (ref 9–12.9)
POTASSIUM SERPL-SCNC: 4.9 MMOL/L (ref 3.6–5.5)
PROTHROMBIN TIME: 19.4 SEC (ref 12–14.6)
RBC # BLD AUTO: 5.25 M/UL (ref 4.2–5.4)
SODIUM SERPL-SCNC: 137 MMOL/L (ref 135–145)
WBC # BLD AUTO: 5.7 K/UL (ref 4.8–10.8)

## 2022-08-11 PROCEDURE — 93010 ELECTROCARDIOGRAM REPORT: CPT | Mod: 59,76 | Performed by: INTERNAL MEDICINE

## 2022-08-11 PROCEDURE — 160002 HCHG RECOVERY MINUTES (STAT)

## 2022-08-11 PROCEDURE — 160036 HCHG PACU - EA ADDL 30 MINS PHASE I

## 2022-08-11 PROCEDURE — 80048 BASIC METABOLIC PNL TOTAL CA: CPT

## 2022-08-11 PROCEDURE — 700105 HCHG RX REV CODE 258: Performed by: INTERNAL MEDICINE

## 2022-08-11 PROCEDURE — 85027 COMPLETE CBC AUTOMATED: CPT

## 2022-08-11 PROCEDURE — 93005 ELECTROCARDIOGRAM TRACING: CPT | Performed by: ANESTHESIOLOGY

## 2022-08-11 PROCEDURE — 92960 CARDIOVERSION ELECTRIC EXT: CPT

## 2022-08-11 PROCEDURE — 93010 ELECTROCARDIOGRAM REPORT: CPT | Mod: 59 | Performed by: INTERNAL MEDICINE

## 2022-08-11 PROCEDURE — 160046 HCHG PACU - 1ST 60 MINS PHASE II

## 2022-08-11 PROCEDURE — 82962 GLUCOSE BLOOD TEST: CPT

## 2022-08-11 PROCEDURE — 700111 HCHG RX REV CODE 636 W/ 250 OVERRIDE (IP): Performed by: ANESTHESIOLOGY

## 2022-08-11 PROCEDURE — 93005 ELECTROCARDIOGRAM TRACING: CPT | Performed by: INTERNAL MEDICINE

## 2022-08-11 PROCEDURE — 85610 PROTHROMBIN TIME: CPT

## 2022-08-11 PROCEDURE — 160035 HCHG PACU - 1ST 60 MINS PHASE I

## 2022-08-11 PROCEDURE — 700101 HCHG RX REV CODE 250: Performed by: ANESTHESIOLOGY

## 2022-08-11 PROCEDURE — 00410 ANES INTEG SYS CONV ARRHYT: CPT | Performed by: ANESTHESIOLOGY

## 2022-08-11 PROCEDURE — 92960 CARDIOVERSION ELECTRIC EXT: CPT | Performed by: INTERNAL MEDICINE

## 2022-08-11 RX ORDER — LIDOCAINE HYDROCHLORIDE 20 MG/ML
INJECTION, SOLUTION EPIDURAL; INFILTRATION; INTRACAUDAL; PERINEURAL PRN
Status: DISCONTINUED | OUTPATIENT
Start: 2022-08-11 | End: 2022-08-11 | Stop reason: SURG

## 2022-08-11 RX ORDER — ONDANSETRON 2 MG/ML
4 INJECTION INTRAMUSCULAR; INTRAVENOUS
Status: DISCONTINUED | OUTPATIENT
Start: 2022-08-11 | End: 2022-08-11 | Stop reason: HOSPADM

## 2022-08-11 RX ORDER — SODIUM CHLORIDE, SODIUM LACTATE, POTASSIUM CHLORIDE, CALCIUM CHLORIDE 600; 310; 30; 20 MG/100ML; MG/100ML; MG/100ML; MG/100ML
INJECTION, SOLUTION INTRAVENOUS CONTINUOUS
Status: DISCONTINUED | OUTPATIENT
Start: 2022-08-11 | End: 2022-08-11 | Stop reason: HOSPADM

## 2022-08-11 RX ADMIN — PROPOFOL 80 MG: 10 INJECTION, EMULSION INTRAVENOUS at 13:14

## 2022-08-11 RX ADMIN — SODIUM CHLORIDE, POTASSIUM CHLORIDE, SODIUM LACTATE AND CALCIUM CHLORIDE: 600; 310; 30; 20 INJECTION, SOLUTION INTRAVENOUS at 13:09

## 2022-08-11 RX ADMIN — LIDOCAINE HYDROCHLORIDE 80 MG: 20 INJECTION, SOLUTION EPIDURAL; INFILTRATION; INTRACAUDAL at 13:14

## 2022-08-11 ASSESSMENT — PAIN SCALES - GENERAL: PAIN_LEVEL: 0

## 2022-08-11 NOTE — OR NURSING
1356 Report received from Sierra Piper RN for break and care of patient assumed at this time. EKG tech at bedside.     1615 Spoke to Dona YEAGER. States Dr Issa has seen EKG and has given ok to discharge.     1619 Report to Mague YEAGER for phase 2. Spoke to  Henrry and gave status update; is waiting in lobby.     1431 IV removed. Pt transferred to phase 2 via [facility], discharge instructions and all personal belongings with patient.

## 2022-08-11 NOTE — ANESTHESIA PREPROCEDURE EVALUATION
Date/Time: 08/11/22 1300    Scheduled providers: Young Issa M.D.; Alicia Condon M.D.    Procedure: CL-CARDIOVERSION    Diagnosis:       Persistent atrial fibrillation (HCC) [I48.19]      Other persistent atrial fibrillation [I48.19]    Indications: af    Location: [facility] IMAGING - ECHOCARDIOLOGY [facility]        61y /afb and depressed EF of 20%    Relevant Problems   ANESTHESIA (within normal limits)      NEURO   (positive) History of non-ST elevation myocardial infarction (NSTEMI)      CARDIAC   (positive) Coronary artery disease, non-occlusive   (positive) PAF (paroxysmal atrial fibrillation) (HCC)   (positive) Persistent atrial fibrillation (HCC)   (positive) SVT (supraventricular tachycardia) (HCC)      ENDO   (positive) Hypothyroidism   (positive) Type 2 diabetes mellitus without complication (HCC)      Other   (positive) Cardiomyopathy (HCC) - EF 20% in afib 2022   (positive) Chronic anticoagulation   (positive) Dyslipidemia   (positive) Obese       Physical Exam    Airway   Mallampati: II  TM distance: >3 FB  Neck ROM: full       Cardiovascular   Rhythm: irregular  (-) murmur     Dental - normal exam           Pulmonary   Breath sounds clear to auscultation     Abdominal    Neurological - normal exam                 Anesthesia Plan    ASA 3   ASA physical status 3 criteria: moderate reduction of ejection fraction    Plan - MAC               Induction: intravenous      Pertinent diagnostic labs and testing reviewed    Informed Consent:    Anesthetic plan and risks discussed with patient.

## 2022-08-11 NOTE — ANESTHESIA POSTPROCEDURE EVALUATION
Patient: Gisel Motnez    Procedure Summary     Date: 08/11/22 Room / Location: [facility] - ECHOCARDIOLOGY [facility]    Anesthesia Start: 1309 Anesthesia Stop: 1334    Procedure: CL-CARDIOVERSION Diagnosis:       Persistent atrial fibrillation (HCC)      Other persistent atrial fibrillation      (af)    Scheduled Providers: Young Issa M.D.; Alicia Condon M.D. Responsible Provider: Alicia Condon M.D.    Anesthesia Type: MAC ASA Status: 3          Final Anesthesia Type: MAC  Last vitals  BP   Blood Pressure: 100/72    Temp   36.6 °C (97.8 °F)    Pulse   73   Resp   20    SpO2   98 %      Anesthesia Post Evaluation    Patient location during evaluation: PACU  Patient participation: complete - patient participated  Level of consciousness: awake and alert  Pain score: 0    Airway patency: patent  Anesthetic complications: no  Cardiovascular status: adequate  Respiratory status: acceptable  Hydration status: acceptable  Comments: Initially on arrival to PACU patient in asymptomatic bigeminy w/pulse ox HR reading of 38 but monitor reading 60s..  12 lead EKG obtained which showed SR at 60 with frequent PACs.  Currently patient monitor shows SR w/frequent PACs at a rate in the 60s.      PONV: none          No notable events documented.     Nurse Pain Score: 0 (NPRS)

## 2022-08-11 NOTE — ANESTHESIA TIME REPORT
Anesthesia Start and Stop Event Times     Date Time Event    8/11/2022 1140 Ready for Procedure     1309 Anesthesia Start     1334 Anesthesia Stop        Responsible Staff  08/11/22    Name Role Begin End    Alicia Condon M.D. Anesth 1309 1334        Overtime Reason:  no overtime (within assigned shift)    Comments:

## 2022-08-11 NOTE — OR NURSING
1430 Patient arrived to phase two. VSS.    1435 RN went over discharge instructions with patients . All questions answered.     1438 Patient meets discharge criteria. Pt discharged via [facility] by RN.

## 2022-08-11 NOTE — DISCHARGE INSTRUCTIONS
If any questions arise, call your provider.  If your provider is not available, please feel free to call the Surgical Center at [phone].    MEDICATIONS: Resume taking daily medication.  Take prescribed pain medication with food.  If no medication is prescribed, you may take non-aspirin pain medication if needed.  PAIN MEDICATION CAN BE VERY CONSTIPATING.  Take a stool softener or laxative such as senokot, pericolace, or milk of magnesia if needed.    Electrical Cardioversion, Care After  This sheet gives you information about how to care for yourself after your procedure. Your health care provider may also give you more specific instructions. If you have problems or questions, contact your health care provider.  What can I expect after the procedure?  After the procedure, it is common to have:  Some redness on the skin where the shocks were given.  Follow these instructions at home:    Do not drive for 24 hours if you were given a medicine to help you relax (sedative).  Take over-the-counter and prescription medicines only as told by your health care provider.  Ask your health care provider how to check your pulse. Check it often.  Rest for 48 hours after the procedure or as told by your health care provider.  Avoid or limit your caffeine use as told by your health care provider.  Contact a health care provider if:  You feel like your heart is beating too quickly or your pulse is not regular.  You have a serious muscle cramp that does not go away.  Get help right away if:    You have discomfort in your chest.  You are dizzy or you feel faint.  You have trouble breathing or you are short of breath.  Your speech is slurred.  You have trouble moving an arm or leg on one side of your body.  Your fingers or toes turn cold or blue.  This information is not intended to replace advice given to you by your health care provider. Make sure you discuss any questions you have with your health care provider.  Document  Released: 10/08/2014 Document Revised: 11/30/2018 Document Reviewed: 06/23/2017  Elsevier Patient Education © 2020 [employer] Inc[employer]

## 2022-08-11 NOTE — PROCEDURES
Procedure performed: External Direct Current Cardioversion WITH THREE ATTEMPTS    : Young Issa MD PhD FACC    Assistant: None    Anesthesia: per Anesthesia services    Indication: Atrial FLUTTER    Preprocedural Diagnosis:   Atrial FLUTTER  Postprocedural Diagnosis: Sinus Rhythm      Description of procedure:  Ms. Montez was brought to the pre/post procedure area of the cath lab. Informed consent was obtained. Defibrillator pads were placed in the anterior and posterior position.  A TIME-OUT was performed. Adequate sedation was obtained with assistance of anesthesia. The patient was successfully cardioverted with  200 J synchronized biphasic energy into sinus rhythm ON THIRD ATTEMPT. She was monitored in the recovery area until criteria met.    Conclusion: Successful DC cardioversion    Complications: None apparent      Electronically signed: Young Issa MD PhD FAC  Cardiologist [facility]

## 2022-08-11 NOTE — OR NURSING
1327- patient arrival from cath lab; report received from MD and RN. Patient attached to monitor; VSS stable with patient on 8 L O2 via mask. Pulse ox rate showing 36 beats/min and ECG rate showing 73 beats/min. Anesthesia requesting ECG to be ordered at this time.

## 2022-08-22 ENCOUNTER — PATIENT MESSAGE (OUTPATIENT)
Dept: CARDIOLOGY | Facility: MEDICAL CENTER | Age: 62
End: 2022-08-22
Payer: COMMERCIAL

## 2022-08-22 ENCOUNTER — TELEPHONE (OUTPATIENT)
Dept: CARDIOLOGY | Facility: MEDICAL CENTER | Age: 62
End: 2022-08-22
Payer: COMMERCIAL

## 2022-08-22 DIAGNOSIS — I48.0 PAF (PAROXYSMAL ATRIAL FIBRILLATION) (HCC): ICD-10-CM

## 2022-08-23 NOTE — TELEPHONE ENCOUNTER
Her heart strength is not improved but the heart rate is which is good.  The cardioversion does not seem to have lasted so it is important she should see Dr. Montjeo or EP as scheduled, perhaps see if there is an earlier appointment in the interim, continue on her current medications and we will see what her monitor shows.

## 2022-09-08 ENCOUNTER — TELEPHONE (OUTPATIENT)
Dept: CARDIOLOGY | Facility: MEDICAL CENTER | Age: 62
End: 2022-09-08

## 2022-09-08 ENCOUNTER — OFFICE VISIT (OUTPATIENT)
Dept: CARDIOLOGY | Facility: MEDICAL CENTER | Age: 62
End: 2022-09-08
Payer: COMMERCIAL

## 2022-09-08 VITALS
HEIGHT: 69 IN | DIASTOLIC BLOOD PRESSURE: 70 MMHG | SYSTOLIC BLOOD PRESSURE: 122 MMHG | RESPIRATION RATE: 12 BRPM | HEART RATE: 59 BPM | OXYGEN SATURATION: 98 % | BODY MASS INDEX: 34.51 KG/M2 | WEIGHT: 233 LBS

## 2022-09-08 DIAGNOSIS — I48.0 PAF (PAROXYSMAL ATRIAL FIBRILLATION) (HCC): ICD-10-CM

## 2022-09-08 PROCEDURE — 93000 ELECTROCARDIOGRAM COMPLETE: CPT | Performed by: INTERNAL MEDICINE

## 2022-09-08 PROCEDURE — 99215 OFFICE O/P EST HI 40 MIN: CPT | Performed by: INTERNAL MEDICINE

## 2022-09-08 NOTE — PROGRESS NOTES
Arrhythmia Clinic Note (New patient)     DOS: 9/8/2022    Referring physician: Dr Issa    Chief complaint/Reason for consult: Afib, heart failure    HPI: 60 y/o F with CAD and h/o MI, NICM EF 25%, persistent Afib s/p DCCV now on amiodarone, presenting for evaluation. Since DCCV notes she goes in and out of atrial fibrillation, feels better in NSR. Repeat echo still with heart failure. EP consulted for arrhythmia management.    ROS (+ highlighted in bold):  Constitutional: Fevers/chills/fatigue/weightloss  HEENT: Blurry vision/eye pain/sore throat/hearing loss  Respiratory: Shortness of breath/cough  Cardiovascular: Chest pain/palpitations/edema/orthopnea/syncope  GI: Nausea/vomitting/diarrhea  MSK: Arthralgias/myagias/muscle weakness  Skin: Rash/sores  Neurological: Numbness/tremors/vertigo  Endocrine: Excessive thirst/polyuria/cold intolerance/heat intolerance  Psych: Depression/anxiety    Past Medical History:   Diagnosis Date    ACTIVE SMOKER      4-10 sticks/40    Acute bronchitis due to infection 10/3/2016    Acute MI, inferior wall (HCC)      CAD (coronary artery disease)      Cardiomyopathy (HCC)     Chronic anticoagulation     Class 2 obesity due to excess calories without serious comorbidity in adult 9/10/2018    Hyperglycemia 10/31/2016    Hypothyroidism      PAF (paroxysmal atrial fibrillation) ([facility])      Palpitations      Type 2 diabetes mellitus without complication ([facility]) 8/28/2017    Vitamin D deficiency 2/27/2017       Past Surgical History:   Procedure Laterality Date    GYN SURGERY      tubal ligation    OTHER      choleycystectomy    OTHER CARDIAC SURGERY      cardioversion    SEPTAL RECONSTRUCTION         Social History     Socioeconomic History    Marital status:      Spouse name: Not on file    Number of children: Not on file    Years of education: Not on file    Highest education level: Not on file   Occupational History    Occupation: other     Comment: [employer]   Tobacco Use     "Smoking status: Former     Packs/day: 0.50     Years: 35.00     Pack years: 17.50     Types: Cigarettes     Quit date: 2015     Years since quittin.2    Smokeless tobacco: Never    Tobacco comments:     occ vape   Vaping Use    Vaping Use: Some days    Substances: Nicotine, 1.8 occasionally   Substance and Sexual Activity    Alcohol use: Yes     Alcohol/week: 3.6 oz     Types: 2 Glasses of wine, 4 Cans of beer per week     Comment: on weekends    Drug use: No    Sexual activity: Yes     Partners: Male   Other Topics Concern    Not on file   Social History Narrative    . Lives in [place], [place]. Works for [employer].     Social Determinants of Health     Financial Resource Strain: Not on file   Food Insecurity: Not on file   Transportation Needs: Not on file   Physical Activity: Not on file   Stress: Not on file   Social Connections: Not on file   Intimate Partner Violence: Not on file   Housing Stability: Not on file       Family History   Problem Relation Age of Onset    Diabetes Father 68       Allergies   Allergen Reactions    Other Food      \"CELERY\". Became violently ill, tongue swelled and hard to breath.    Doxycycline        Current Outpatient Medications   Medication Sig Dispense Refill    cetirizine (ZYRTEC) 10 MG Tab Take 10 mg by mouth every day.      Multiple Vitamins-Minerals (HAIR SKIN NAILS PO) Take  by mouth every day.      amiodarone (CORDARONE) 200 MG Tab Take 1 Tablet by mouth every day. 90 Tablet 3    metoprolol SR (TOPROL XL) 100 MG TABLET SR 24 HR Take 1 Tablet by mouth every day. Please call [phone] to schedule follow up appointment for further refills, thank you. 90 Tablet 3    rivaroxaban (XARELTO) 20 MG Tab tablet Take 1 Tablet by mouth with dinner. 90 Tablet 3    levothyroxine (SYNTHROID) 112 MCG Tab Take 1 Tab by mouth every morning before breakfast. 30 Tab 2    metFORMIN (GLUCOPHAGE) 500 MG Tab TAKE ONE TABLET BY MOUTH EVERY DAY (Patient taking differently: 2 times " "a day.) 30 Tab 5    CINNAMON PO Take 2 Capsule by mouth every day.      EPIPEN 2-YVROSE 0.3 MG/0.3ML Solution Auto-injector solution for injection       vitamin D (CHOLECALCIFEROL) 1000 UNIT TABS Take 2,000 Units by mouth every day.       No current facility-administered medications for this visit.       Physical Exam:  Vitals:    09/08/22 1249   BP: 122/70   BP Location: Left arm   Patient Position: Sitting   BP Cuff Size: Adult   Pulse: (!) 59   Resp: 12   SpO2: 98%   Weight: 106 kg (233 lb)   Height: 1.753 m (5' 9\")     General appearance: NAD, conversant   Eyes: anicteric sclerae, moist conjunctivae; no lid-lag; PERRLA  HENT: Atraumatic; oropharynx clear with moist mucous membranes and no mucosal ulcerations; normal hard and soft palate  Neck: Trachea midline; FROM, supple, no thyromegaly or lymphadenopathy  Lungs: CTA, with normal respiratory effort and no intercostal retractions  CV: RRR, no MRGs, no JVD   Abdomen: Soft, non-tender; no masses or HSM  Extremities: No peripheral edema or extremity lymphadenopathy  Skin: Normal temperature, turgor and texture; no rash, ulcers or subcutaneous nodules  Psych: Appropriate affect, alert and oriented to person, place and time    Data:  Lipids:   Lab Results   Component Value Date/Time    CHOLSTRLTOT 104 04/25/2019 07:54 AM    CHOLSTRLTOT 106 10/17/2016 10:04 AM    TRIGLYCERIDE 99 04/25/2019 07:54 AM    TRIGLYCERIDE 121 10/17/2016 10:04 AM    HDL 38 (L) 04/25/2019 07:54 AM    HDL 33 (A) 10/17/2016 10:04 AM    LDL 46 04/25/2019 07:54 AM    LDL 49 10/17/2016 10:04 AM        BMP:  Lab Results   Component Value Date/Time    SODIUM 137 08/11/2022 1135    POTASSIUM 4.9 08/11/2022 1135    CHLORIDE 105 08/11/2022 1135    CO2 20 08/11/2022 1135    GLUCOSE 109 (H) 08/11/2022 1135    BUN 12 08/11/2022 1135    CREATININE 0.66 08/11/2022 1135    CALCIUM 9.6 08/11/2022 1135    ANION 12.0 08/11/2022 1135        TSH:   Lab Results   Component Value Date/Time    TSHULTRASEN 1.450 " 10/17/2016 1004        THYROXINE (T4):   No results found for: FREEDIR     CBC:   Lab Results   Component Value Date/Time    WBC 5.7 08/11/2022 11:35 AM    RBC 5.25 08/11/2022 11:35 AM    HEMOGLOBIN 16.1 (H) 08/11/2022 11:35 AM    HEMATOCRIT 47.5 (H) 08/11/2022 11:35 AM    MCV 90.5 08/11/2022 11:35 AM    MCH 30.7 08/11/2022 11:35 AM    MCHC 33.9 08/11/2022 11:35 AM    RDW 45.8 08/11/2022 11:35 AM    PLATELETCT 171 08/11/2022 11:35 AM    MPV 11.3 08/11/2022 11:35 AM    NEUTSPOLYS 57 04/25/2019 07:54 AM    NEUTSPOLYS 50.30 10/17/2016 10:04 AM    LYMPHOCYTES 36 04/25/2019 07:54 AM    LYMPHOCYTES 40.60 10/17/2016 10:04 AM    MONOCYTES 6 04/25/2019 07:54 AM    MONOCYTES 6.00 10/17/2016 10:04 AM    EOSINOPHILS 1 04/25/2019 07:54 AM    EOSINOPHILS 2.10 10/17/2016 10:04 AM    BASOPHILS 0 04/25/2019 07:54 AM    BASOPHILS 0.70 10/17/2016 10:04 AM    IMMGRAN 0 04/25/2019 07:54 AM    NRBC CANCELED 04/25/2019 07:54 AM    NRBC 0.00 10/17/2016 10:04 AM    NEUTS 3.9 04/25/2019 07:54 AM    LYMPHS 2.5 04/25/2019 07:54 AM    MONOS 0.4 04/25/2019 07:54 AM    EOS 0.1 04/25/2019 07:54 AM    BASO 0.0 04/25/2019 07:54 AM    IMMGRANAB 0.0 04/25/2019 07:54 AM    NRBCAB 0.00 10/17/2016 10:04 AM        CBC w/o DIFF  Lab Results   Component Value Date/Time    WBC 5.7 08/11/2022 11:35 AM    RBC 5.25 08/11/2022 11:35 AM    HEMOGLOBIN 16.1 (H) 08/11/2022 11:35 AM    MCV 90.5 08/11/2022 11:35 AM    MCH 30.7 08/11/2022 11:35 AM    MCHC 33.9 08/11/2022 11:35 AM    RDW 45.8 08/11/2022 11:35 AM    MPV 11.3 08/11/2022 11:35 AM       Prior echo/stress results reviewed: EF 25%    EKG interpreted by me: NSR. Compared to ECG 08/22 Afib no longer present    Impression/Plan:  1. PAF (paroxysmal atrial fibrillation) ([facility])  EKG    CL-EP ABLATION ATRIAL FIBRILLATION    EC-TOÑO W/O CONT        Chronic systolic heart failure  Persistent afib s/p DCCV  High risk medication use, amiodarone  Anticoagulation management    - Continue amiodarone for now  - Continue OAC    - Discussed rhythm management, given young age amiodarone not ideal, long QT not a good candidate for class III, given HF and CAD cannot have 1c agent, we discussed ablation. We discussed pulmonary vein isolation for therapeutic management and continued rhythm control.  We discussed the risks and benefits of this procedure.  Risks include 1-3% risk of major cardiovascular event including stroke, myocardial infarction, phrenic nerve damage, esophageal injury and/or fistula formation, cardiac perforation, pericardial effusion, tamponade, major bleeding, or death.  I quoted a 70 to 80% chance free of atrial fibrillation at 12 months.  We discussed that he may also need a second procedure.    She is in agreement to proceed with ablation for Afib      Castro Montejo MD  Cardiac Electrophysiology

## 2022-09-08 NOTE — TELEPHONE ENCOUNTER
----- Message from Castro Montejo M.D. sent at 9/8/2022  1:27 PM PDT -----  Please schedule Afib ablation next available, no meds to hold

## 2022-09-08 NOTE — TELEPHONE ENCOUNTER
Patient scheduled for afib ablation w/TOÑO on 9-26-22 with Dr. Montejo. Patient has been instructed to check in at 6:00 for 7:30 case time. Message sent to authorizations. Emailed Carto.

## 2022-09-12 ENCOUNTER — TELEPHONE (OUTPATIENT)
Dept: CARDIOLOGY | Facility: MEDICAL CENTER | Age: 62
End: 2022-09-12
Payer: COMMERCIAL

## 2022-09-12 ENCOUNTER — PATIENT MESSAGE (OUTPATIENT)
Dept: CARDIOLOGY | Facility: MEDICAL CENTER | Age: 62
End: 2022-09-12
Payer: COMMERCIAL

## 2022-09-12 PROCEDURE — 93268 ECG RECORD/REVIEW: CPT | Performed by: INTERNAL MEDICINE

## 2022-09-12 NOTE — TELEPHONE ENCOUNTER
----- Message from Young Issa M.D. sent at 9/12/2022 11:18 AM PDT -----  She has A. fib that comes and goes, heart rate has been better which is good agree that she should have the ablation on September 26

## 2022-09-19 ENCOUNTER — PRE-ADMISSION TESTING (OUTPATIENT)
Dept: ADMISSIONS | Facility: MEDICAL CENTER | Age: 62
End: 2022-09-19
Attending: INTERNAL MEDICINE
Payer: COMMERCIAL

## 2022-09-26 ENCOUNTER — ANESTHESIA EVENT (OUTPATIENT)
Dept: CARDIOLOGY | Facility: MEDICAL CENTER | Age: 62
End: 2022-09-26
Payer: COMMERCIAL

## 2022-09-26 ENCOUNTER — APPOINTMENT (OUTPATIENT)
Dept: CARDIOLOGY | Facility: MEDICAL CENTER | Age: 62
End: 2022-09-26
Attending: INTERNAL MEDICINE
Payer: COMMERCIAL

## 2022-09-26 ENCOUNTER — ANESTHESIA (OUTPATIENT)
Dept: CARDIOLOGY | Facility: MEDICAL CENTER | Age: 62
End: 2022-09-26
Payer: COMMERCIAL

## 2022-09-26 ENCOUNTER — HOSPITAL ENCOUNTER (OUTPATIENT)
Facility: MEDICAL CENTER | Age: 62
End: 2022-09-26
Attending: INTERNAL MEDICINE | Admitting: INTERNAL MEDICINE
Payer: COMMERCIAL

## 2022-09-26 VITALS
DIASTOLIC BLOOD PRESSURE: 59 MMHG | SYSTOLIC BLOOD PRESSURE: 113 MMHG | WEIGHT: 234.35 LBS | BODY MASS INDEX: 34.71 KG/M2 | HEIGHT: 69 IN | OXYGEN SATURATION: 94 % | RESPIRATION RATE: 16 BRPM | HEART RATE: 56 BPM | TEMPERATURE: 96.9 F

## 2022-09-26 DIAGNOSIS — I48.19 PERSISTENT ATRIAL FIBRILLATION (HCC): ICD-10-CM

## 2022-09-26 DIAGNOSIS — I48.0 PAF (PAROXYSMAL ATRIAL FIBRILLATION) (HCC): ICD-10-CM

## 2022-09-26 DIAGNOSIS — Z98.890 H/O CARDIAC RADIOFREQUENCY ABLATION: ICD-10-CM

## 2022-09-26 LAB
ACT BLD: 324 SEC (ref 74–137)
ACT BLD: 376 SEC (ref 74–137)
ALBUMIN SERPL BCP-MCNC: 3.9 G/DL (ref 3.2–4.9)
ALBUMIN/GLOB SERPL: 0.9 G/DL
ALP SERPL-CCNC: 99 U/L (ref 30–99)
ALT SERPL-CCNC: 26 U/L (ref 2–50)
ANION GAP SERPL CALC-SCNC: 13 MMOL/L (ref 7–16)
AST SERPL-CCNC: 28 U/L (ref 12–45)
BASOPHILS # BLD AUTO: 0.7 % (ref 0–1.8)
BASOPHILS # BLD: 0.04 K/UL (ref 0–0.12)
BILIRUB SERPL-MCNC: 0.4 MG/DL (ref 0.1–1.5)
BUN SERPL-MCNC: 14 MG/DL (ref 8–22)
CALCIUM SERPL-MCNC: 9.2 MG/DL (ref 8.5–10.5)
CHLORIDE SERPL-SCNC: 103 MMOL/L (ref 96–112)
CO2 SERPL-SCNC: 21 MMOL/L (ref 20–33)
CREAT SERPL-MCNC: 0.64 MG/DL (ref 0.5–1.4)
EKG IMPRESSION: NORMAL
EKG IMPRESSION: NORMAL
EOSINOPHIL # BLD AUTO: 0.08 K/UL (ref 0–0.51)
EOSINOPHIL NFR BLD: 1.4 % (ref 0–6.9)
ERYTHROCYTE [DISTWIDTH] IN BLOOD BY AUTOMATED COUNT: 48.7 FL (ref 35.9–50)
GFR SERPLBLD CREATININE-BSD FMLA CKD-EPI: 100 ML/MIN/1.73 M 2
GLOBULIN SER CALC-MCNC: 4.2 G/DL (ref 1.9–3.5)
GLUCOSE BLD STRIP.AUTO-MCNC: 116 MG/DL (ref 65–99)
GLUCOSE SERPL-MCNC: 113 MG/DL (ref 65–99)
HCT VFR BLD AUTO: 45.9 % (ref 37–47)
HGB BLD-MCNC: 15.6 G/DL (ref 12–16)
IMM GRANULOCYTES # BLD AUTO: 0.01 K/UL (ref 0–0.11)
IMM GRANULOCYTES NFR BLD AUTO: 0.2 % (ref 0–0.9)
INR PPP: 1.83 (ref 0.87–1.13)
LYMPHOCYTES # BLD AUTO: 2.17 K/UL (ref 1–4.8)
LYMPHOCYTES NFR BLD: 37.9 % (ref 22–41)
MCH RBC QN AUTO: 31.4 PG (ref 27–33)
MCHC RBC AUTO-ENTMCNC: 34 G/DL (ref 33.6–35)
MCV RBC AUTO: 92.4 FL (ref 81.4–97.8)
MONOCYTES # BLD AUTO: 0.44 K/UL (ref 0–0.85)
MONOCYTES NFR BLD AUTO: 7.7 % (ref 0–13.4)
NEUTROPHILS # BLD AUTO: 2.98 K/UL (ref 2–7.15)
NEUTROPHILS NFR BLD: 52.1 % (ref 44–72)
NRBC # BLD AUTO: 0 K/UL
NRBC BLD-RTO: 0 /100 WBC
PLATELET # BLD AUTO: 190 K/UL (ref 164–446)
PMV BLD AUTO: 10.8 FL (ref 9–12.9)
POTASSIUM SERPL-SCNC: 4 MMOL/L (ref 3.6–5.5)
PROT SERPL-MCNC: 8.1 G/DL (ref 6–8.2)
PROTHROMBIN TIME: 20.7 SEC (ref 12–14.6)
RBC # BLD AUTO: 4.97 M/UL (ref 4.2–5.4)
SODIUM SERPL-SCNC: 137 MMOL/L (ref 135–145)
WBC # BLD AUTO: 5.7 K/UL (ref 4.8–10.8)

## 2022-09-26 PROCEDURE — 93623 PRGRMD STIMJ&PACG IV RX NFS: CPT

## 2022-09-26 PROCEDURE — 93010 ELECTROCARDIOGRAM REPORT: CPT | Mod: 76 | Performed by: INTERNAL MEDICINE

## 2022-09-26 PROCEDURE — 36415 COLL VENOUS BLD VENIPUNCTURE: CPT

## 2022-09-26 PROCEDURE — 80053 COMPREHEN METABOLIC PANEL: CPT

## 2022-09-26 PROCEDURE — 93656 COMPRE EP EVAL ABLTJ ATR FIB: CPT | Performed by: INTERNAL MEDICINE

## 2022-09-26 PROCEDURE — 93005 ELECTROCARDIOGRAM TRACING: CPT | Performed by: INTERNAL MEDICINE

## 2022-09-26 PROCEDURE — 85025 COMPLETE CBC W/AUTO DIFF WBC: CPT

## 2022-09-26 PROCEDURE — 700105 HCHG RX REV CODE 258: Performed by: INTERNAL MEDICINE

## 2022-09-26 PROCEDURE — 36620 INSERTION CATHETER ARTERY: CPT | Performed by: STUDENT IN AN ORGANIZED HEALTH CARE EDUCATION/TRAINING PROGRAM

## 2022-09-26 PROCEDURE — 82962 GLUCOSE BLOOD TEST: CPT

## 2022-09-26 PROCEDURE — 00537 ANES CARDIAC EP PROCEDURES: CPT | Performed by: STUDENT IN AN ORGANIZED HEALTH CARE EDUCATION/TRAINING PROGRAM

## 2022-09-26 PROCEDURE — 160046 HCHG PACU - 1ST 60 MINS PHASE II

## 2022-09-26 PROCEDURE — 85347 COAGULATION TIME ACTIVATED: CPT

## 2022-09-26 PROCEDURE — 93312 ECHO TRANSESOPHAGEAL: CPT | Mod: 26 | Performed by: INTERNAL MEDICINE

## 2022-09-26 PROCEDURE — 85610 PROTHROMBIN TIME: CPT

## 2022-09-26 PROCEDURE — 160002 HCHG RECOVERY MINUTES (STAT)

## 2022-09-26 PROCEDURE — 93655 ICAR CATH ABLTJ DSCRT ARRHYT: CPT | Performed by: INTERNAL MEDICINE

## 2022-09-26 PROCEDURE — 700101 HCHG RX REV CODE 250: Performed by: INTERNAL MEDICINE

## 2022-09-26 PROCEDURE — 160036 HCHG PACU - EA ADDL 30 MINS PHASE I

## 2022-09-26 PROCEDURE — 93312 ECHO TRANSESOPHAGEAL: CPT

## 2022-09-26 PROCEDURE — 700101 HCHG RX REV CODE 250: Performed by: STUDENT IN AN ORGANIZED HEALTH CARE EDUCATION/TRAINING PROGRAM

## 2022-09-26 PROCEDURE — 93623 PRGRMD STIMJ&PACG IV RX NFS: CPT | Mod: 26 | Performed by: INTERNAL MEDICINE

## 2022-09-26 PROCEDURE — 160047 HCHG PACU  - EA ADDL 30 MINS PHASE II

## 2022-09-26 PROCEDURE — 160035 HCHG PACU - 1ST 60 MINS PHASE I

## 2022-09-26 PROCEDURE — 700101 HCHG RX REV CODE 250

## 2022-09-26 PROCEDURE — 700111 HCHG RX REV CODE 636 W/ 250 OVERRIDE (IP)

## 2022-09-26 PROCEDURE — 700111 HCHG RX REV CODE 636 W/ 250 OVERRIDE (IP): Performed by: STUDENT IN AN ORGANIZED HEALTH CARE EDUCATION/TRAINING PROGRAM

## 2022-09-26 PROCEDURE — 93657 TX L/R ATRIAL FIB ADDL: CPT | Performed by: INTERNAL MEDICINE

## 2022-09-26 PROCEDURE — 93325 DOPPLER ECHO COLOR FLOW MAPG: CPT | Mod: 26 | Performed by: INTERNAL MEDICINE

## 2022-09-26 RX ORDER — SODIUM CHLORIDE, SODIUM LACTATE, POTASSIUM CHLORIDE, CALCIUM CHLORIDE 600; 310; 30; 20 MG/100ML; MG/100ML; MG/100ML; MG/100ML
INJECTION, SOLUTION INTRAVENOUS CONTINUOUS
Status: ACTIVE | OUTPATIENT
Start: 2022-09-26 | End: 2022-09-26

## 2022-09-26 RX ORDER — OMEPRAZOLE 20 MG/1
20 CAPSULE, DELAYED RELEASE ORAL DAILY
Qty: 30 CAPSULE | Refills: 0 | Status: SHIPPED
Start: 2022-09-26 | End: 2022-12-01

## 2022-09-26 RX ORDER — MIDAZOLAM HYDROCHLORIDE 1 MG/ML
1 INJECTION INTRAMUSCULAR; INTRAVENOUS
Status: DISCONTINUED | OUTPATIENT
Start: 2022-09-26 | End: 2022-09-26 | Stop reason: HOSPADM

## 2022-09-26 RX ORDER — ONDANSETRON 2 MG/ML
4 INJECTION INTRAMUSCULAR; INTRAVENOUS
Status: DISCONTINUED | OUTPATIENT
Start: 2022-09-26 | End: 2022-09-26 | Stop reason: HOSPADM

## 2022-09-26 RX ORDER — ONDANSETRON 2 MG/ML
INJECTION INTRAMUSCULAR; INTRAVENOUS PRN
Status: DISCONTINUED | OUTPATIENT
Start: 2022-09-26 | End: 2022-09-26 | Stop reason: SURG

## 2022-09-26 RX ORDER — HYDROMORPHONE HYDROCHLORIDE 1 MG/ML
0.4 INJECTION, SOLUTION INTRAMUSCULAR; INTRAVENOUS; SUBCUTANEOUS
Status: DISCONTINUED | OUTPATIENT
Start: 2022-09-26 | End: 2022-09-26 | Stop reason: HOSPADM

## 2022-09-26 RX ORDER — ISOPROTERENOL HYDROCHLORIDE 0.2 MG/ML
INJECTION, SOLUTION INTRAVENOUS
Status: COMPLETED
Start: 2022-09-26 | End: 2022-09-26

## 2022-09-26 RX ORDER — MEPERIDINE HYDROCHLORIDE 25 MG/ML
12.5 INJECTION INTRAMUSCULAR; INTRAVENOUS; SUBCUTANEOUS
Status: DISCONTINUED | OUTPATIENT
Start: 2022-09-26 | End: 2022-09-26 | Stop reason: HOSPADM

## 2022-09-26 RX ORDER — MIDAZOLAM HYDROCHLORIDE 1 MG/ML
INJECTION INTRAMUSCULAR; INTRAVENOUS
Status: COMPLETED
Start: 2022-09-26 | End: 2022-09-26

## 2022-09-26 RX ORDER — BUPIVACAINE HYDROCHLORIDE 2.5 MG/ML
INJECTION, SOLUTION EPIDURAL; INFILTRATION; INTRACAUDAL
Status: COMPLETED
Start: 2022-09-26 | End: 2022-09-26

## 2022-09-26 RX ORDER — DIPHENHYDRAMINE HYDROCHLORIDE 50 MG/ML
12.5 INJECTION INTRAMUSCULAR; INTRAVENOUS
Status: DISCONTINUED | OUTPATIENT
Start: 2022-09-26 | End: 2022-09-26 | Stop reason: HOSPADM

## 2022-09-26 RX ORDER — HALOPERIDOL 5 MG/ML
1 INJECTION INTRAMUSCULAR
Status: DISCONTINUED | OUTPATIENT
Start: 2022-09-26 | End: 2022-09-26 | Stop reason: HOSPADM

## 2022-09-26 RX ORDER — PROTAMINE SULFATE 10 MG/ML
INJECTION, SOLUTION INTRAVENOUS
Status: COMPLETED
Start: 2022-09-26 | End: 2022-09-26

## 2022-09-26 RX ORDER — HYDROMORPHONE HYDROCHLORIDE 1 MG/ML
0.2 INJECTION, SOLUTION INTRAMUSCULAR; INTRAVENOUS; SUBCUTANEOUS
Status: DISCONTINUED | OUTPATIENT
Start: 2022-09-26 | End: 2022-09-26 | Stop reason: HOSPADM

## 2022-09-26 RX ORDER — DEXAMETHASONE SODIUM PHOSPHATE 4 MG/ML
INJECTION, SOLUTION INTRA-ARTICULAR; INTRALESIONAL; INTRAMUSCULAR; INTRAVENOUS; SOFT TISSUE PRN
Status: DISCONTINUED | OUTPATIENT
Start: 2022-09-26 | End: 2022-09-26 | Stop reason: SURG

## 2022-09-26 RX ORDER — LIDOCAINE HYDROCHLORIDE 20 MG/ML
INJECTION, SOLUTION EPIDURAL; INFILTRATION; INTRACAUDAL; PERINEURAL
Status: COMPLETED
Start: 2022-09-26 | End: 2022-09-26

## 2022-09-26 RX ORDER — HEPARIN SODIUM 200 [USP'U]/100ML
INJECTION, SOLUTION INTRAVENOUS
Status: COMPLETED
Start: 2022-09-26 | End: 2022-09-26

## 2022-09-26 RX ORDER — PHENYLEPHRINE HYDROCHLORIDE 10 MG/ML
INJECTION, SOLUTION INTRAMUSCULAR; INTRAVENOUS; SUBCUTANEOUS PRN
Status: DISCONTINUED | OUTPATIENT
Start: 2022-09-26 | End: 2022-09-26 | Stop reason: SURG

## 2022-09-26 RX ORDER — SODIUM CHLORIDE, SODIUM LACTATE, POTASSIUM CHLORIDE, CALCIUM CHLORIDE 600; 310; 30; 20 MG/100ML; MG/100ML; MG/100ML; MG/100ML
INJECTION, SOLUTION INTRAVENOUS CONTINUOUS
Status: DISCONTINUED | OUTPATIENT
Start: 2022-09-26 | End: 2022-09-26 | Stop reason: HOSPADM

## 2022-09-26 RX ORDER — LIDOCAINE HYDROCHLORIDE 20 MG/ML
INJECTION, SOLUTION EPIDURAL; INFILTRATION; INTRACAUDAL; PERINEURAL PRN
Status: DISCONTINUED | OUTPATIENT
Start: 2022-09-26 | End: 2022-09-26 | Stop reason: SURG

## 2022-09-26 RX ORDER — ADENOSINE 3 MG/ML
INJECTION, SOLUTION INTRAVENOUS
Status: COMPLETED
Start: 2022-09-26 | End: 2022-09-26

## 2022-09-26 RX ORDER — HEPARIN SODIUM 1000 [USP'U]/ML
INJECTION, SOLUTION INTRAVENOUS; SUBCUTANEOUS
Status: COMPLETED
Start: 2022-09-26 | End: 2022-09-26

## 2022-09-26 RX ORDER — HYDRALAZINE HYDROCHLORIDE 20 MG/ML
5 INJECTION INTRAMUSCULAR; INTRAVENOUS
Status: DISCONTINUED | OUTPATIENT
Start: 2022-09-26 | End: 2022-09-26 | Stop reason: HOSPADM

## 2022-09-26 RX ORDER — OXYCODONE HCL 5 MG/5 ML
5 SOLUTION, ORAL ORAL
Status: DISCONTINUED | OUTPATIENT
Start: 2022-09-26 | End: 2022-09-26 | Stop reason: HOSPADM

## 2022-09-26 RX ORDER — LABETALOL HYDROCHLORIDE 5 MG/ML
5 INJECTION, SOLUTION INTRAVENOUS
Status: DISCONTINUED | OUTPATIENT
Start: 2022-09-26 | End: 2022-09-26 | Stop reason: HOSPADM

## 2022-09-26 RX ORDER — OXYCODONE HCL 5 MG/5 ML
10 SOLUTION, ORAL ORAL
Status: DISCONTINUED | OUTPATIENT
Start: 2022-09-26 | End: 2022-09-26 | Stop reason: HOSPADM

## 2022-09-26 RX ORDER — HYDROMORPHONE HYDROCHLORIDE 1 MG/ML
0.1 INJECTION, SOLUTION INTRAMUSCULAR; INTRAVENOUS; SUBCUTANEOUS
Status: DISCONTINUED | OUTPATIENT
Start: 2022-09-26 | End: 2022-09-26 | Stop reason: HOSPADM

## 2022-09-26 RX ADMIN — ADENOSINE 12 MG: 3 INJECTION, SOLUTION INTRAVENOUS at 09:24

## 2022-09-26 RX ADMIN — SODIUM CHLORIDE, POTASSIUM CHLORIDE, SODIUM LACTATE AND CALCIUM CHLORIDE: 600; 310; 30; 20 INJECTION, SOLUTION INTRAVENOUS at 06:48

## 2022-09-26 RX ADMIN — ROCURONIUM BROMIDE 70 MG: 10 INJECTION, SOLUTION INTRAVENOUS at 07:57

## 2022-09-26 RX ADMIN — PHENYLEPHRINE HYDROCHLORIDE 100 MCG: 10 INJECTION INTRAVENOUS at 09:31

## 2022-09-26 RX ADMIN — FENTANYL CITRATE 50 MCG: 50 INJECTION, SOLUTION INTRAMUSCULAR; INTRAVENOUS at 07:55

## 2022-09-26 RX ADMIN — FENTANYL CITRATE 50 MCG: 50 INJECTION, SOLUTION INTRAMUSCULAR; INTRAVENOUS at 09:58

## 2022-09-26 RX ADMIN — PROTAMINE SULFATE 50 MG: 10 INJECTION, SOLUTION INTRAVENOUS at 09:51

## 2022-09-26 RX ADMIN — HEPARIN SODIUM 2000 UNITS: 200 INJECTION, SOLUTION INTRAVENOUS at 08:17

## 2022-09-26 RX ADMIN — LIDOCAINE HYDROCHLORIDE 0.5 ML: 10 INJECTION, SOLUTION EPIDURAL; INFILTRATION; INTRACAUDAL; PERINEURAL at 06:48

## 2022-09-26 RX ADMIN — MIDAZOLAM 1 MG: 1 INJECTION INTRAMUSCULAR; INTRAVENOUS at 09:58

## 2022-09-26 RX ADMIN — DEXAMETHASONE SODIUM PHOSPHATE 4 MG: 4 INJECTION, SOLUTION INTRA-ARTICULAR; INTRALESIONAL; INTRAMUSCULAR; INTRAVENOUS; SOFT TISSUE at 08:07

## 2022-09-26 RX ADMIN — HEPARIN SODIUM 6000 UNITS: 200 INJECTION, SOLUTION INTRAVENOUS at 08:16

## 2022-09-26 RX ADMIN — ONDANSETRON 4 MG: 2 INJECTION INTRAMUSCULAR; INTRAVENOUS at 09:48

## 2022-09-26 RX ADMIN — MIDAZOLAM 1 MG: 1 INJECTION INTRAMUSCULAR; INTRAVENOUS at 07:48

## 2022-09-26 RX ADMIN — BUPIVACAINE HYDROCHLORIDE: 2.5 INJECTION, SOLUTION EPIDURAL; INFILTRATION; INTRACAUDAL; PERINEURAL at 08:15

## 2022-09-26 RX ADMIN — ISOPROTERENOL HYDROCHLORIDE 0.2 MG: 0.2 INJECTION, SOLUTION INTRACARDIAC; INTRAMUSCULAR; INTRAVENOUS; SUBCUTANEOUS at 09:23

## 2022-09-26 RX ADMIN — ROCURONIUM BROMIDE 10 MG: 10 INJECTION, SOLUTION INTRAVENOUS at 08:46

## 2022-09-26 RX ADMIN — HEPARIN SODIUM 15000 UNITS: 1000 INJECTION, SOLUTION INTRAVENOUS; SUBCUTANEOUS at 08:40

## 2022-09-26 RX ADMIN — LIDOCAINE HYDROCHLORIDE 40 MG: 20 INJECTION, SOLUTION EPIDURAL; INFILTRATION; INTRACAUDAL at 07:57

## 2022-09-26 RX ADMIN — PROPOFOL 150 MG: 10 INJECTION, EMULSION INTRAVENOUS at 07:57

## 2022-09-26 RX ADMIN — LIDOCAINE HYDROCHLORIDE 10 ML: 20 INJECTION, SOLUTION EPIDURAL; INFILTRATION; INTRACAUDAL; PERINEURAL at 08:16

## 2022-09-26 RX ADMIN — SUGAMMADEX 200 MG: 100 INJECTION, SOLUTION INTRAVENOUS at 09:58

## 2022-09-26 ASSESSMENT — PAIN DESCRIPTION - PAIN TYPE
TYPE: SURGICAL PAIN
TYPE: ACUTE PAIN

## 2022-09-26 NOTE — PROGRESS NOTES
Seen in afternoon same day discharge EP rounds.  S/P Afib ablation with PVI, PWI, typical RA flutter by Gustabo  for symptomatic persistent atria fibrillation.           Monitored rhythm has been sinus throughout monitored recovery.  Vital signs are stable. Bilateral femoral access sites are clean and dry without evidence of hematoma.      I have verified that new discharge prescriptions have been sent to correct pharmacy, patient has active anticoagulation prescription, and provided education about importance of esophageal prophylaxis post ablation.      Discharge instructions discussed with patient:  [facility] Post Ablation Patient Instructions:  No lifting > 10 lbs x 1 week.    No soaking in baths, hot tubs, pools x 1 week.  May shower the day after discharge and take off groin dressings and leave uncovered.  Continue to monitor sites daily for warmth, redness, discolored drainage.  It is common to have a small lump in the area where the cather was (usually the size of a small marble); this will go away but takes approximately 6 weeks to normalize.   3.   Please take all medications as prescribed to you; please do not stop any medications prescribed post ablation unless directed by your healthcare provider.    4.   Please do not miss any doses of your blood thinner (if you have been started on, or take chronic blood thinners) without discussion with your healthcare provider first.   5.   Please walk and take deep breaths after discharge.  After discharge, if  experiences neurological changes/signs of stroke, high fever, you should be seen in the emergency dept.   6.   It is possible you may experience some chest discomfort or chest tightness post ablation.  This is usually secondary to inflammation and irritation of the tissues at the area of the ablation.  If this occurs, it is advised to try 400 mg of Ibuprofen with food as needed up to three times a day for a maximum of two  days.  This should help to decrease pain and tissue inflammation.          **Please notify the office ([phone]) if this occurs.         ** DO NOT TAKE Ibuprofen IF HISTORY OF SIGNIFICANT BLEEDING OR KIDNEY DISEASE WITHOUT DISCUSSING WITH YOUR CARDIOLOGY PROVIDER FIRST.          ** If pain becomes severe or you have additional symptoms you may need to be medically evaluated; please contact the cardiology office ([phone]) for further direction.   7. It is possible that you may experience arrhythmia/Atrial Fibrillation post ablation.  This is secondary to irritation and inflammation of the cardiac tissues from the ablation.  If you have atrial fibrillation all day or feel poorly with it, please notify your cardiologist's via phone ([phone]) or [employer].    8.  Please contact call our office ([phone]) or message via [employer] message if you have any questions or concerns post procedurally.  9. You need to be seen for post ablation follow up 2-4 weeks post procedure.  If you do not have a follow up appointment scheduled, please call [phone] to schedule your follow up appointment.

## 2022-09-26 NOTE — ANESTHESIA POSTPROCEDURE EVALUATION
Patient: Gisel Montez    Procedure Summary     Date: 09/26/22 Room / Location: [facility] IMAGING - CATH LAB [facility]    Anesthesia Start: 0746 Anesthesia Stop: 1009    Procedure: CL-EP ABLATION ATRIAL FIBRILLATION Diagnosis:       PAF (paroxysmal atrial fibrillation) (HCC)      Paroxysmal atrial fibrillation      (See Associated Dx)    Scheduled Providers: Castro Montejo M.D.; Bobby Nesbitt M.D. Responsible Provider: Bobby Nesbitt M.D.    Anesthesia Type: general ASA Status: 4          Final Anesthesia Type: general  Last vitals  BP   Blood Pressure: 112/61    Temp   36.1 °C (96.9 °F)    Pulse   (!) 50   Resp   16    SpO2   94 %      Anesthesia Post Evaluation    Patient location during evaluation: PACU  Patient participation: complete - patient participated  Level of consciousness: awake and alert    Airway patency: patent  Anesthetic complications: no  Cardiovascular status: hemodynamically stable  Respiratory status: acceptable  Hydration status: euvolemic    PONV: none          No notable events documented.     Nurse Pain Score: 0 (NPRS)

## 2022-09-26 NOTE — OR NURSING
Report given to rashi YEAGER via SBAR reviewed orders and patient history, no questions at this time.  Pt alert and oriented, resp even and nonlabored, in NAD, pt denies pain/nausea at this time. Pt moves all ext, follows commands and verbalized understanding  of poc and discharge/admission. Family notified via text/phone patient is moving to phase II/room. Will con't to monitor until patient has transitioned.

## 2022-09-26 NOTE — OP REPORT
Electrophysiology Procedure Note  [facility]    Procedures Performed:  Pulmonary Vein Isolation  Additional ablation for atrial fibrillation  Ablation of additional arrhythmia  Intracardiac Echocardiography  Three-dimensional intracardiac mapping  IV isoproterenol infusion with programmed stimulation  Trans-esophageal echocardiogram    Electrophysiologist: Castro Montejo MD    Assistant(s): None    Anesthesia: General anesthesia was provided by Dr. Nesbitt of the Anesthesiology service.    Statement of Medical Necessity: This is a 62  year-old female with history of symptomatic persistent atrial  fibrillation     Pre-procedure ECG: SR    Post-procedure ECG: Sinus     Description of Procedure:    Access and catheter placement: After obtaining informed written consent, the patient was  brought to the EP lab in the fasting, non-sedated stated. The patient was sedated and intubated  by the anesthesiologist. The patient was prepped and draped in the usual sterile fashion. Using  the modified Seldinger technique, access was obtained in bilateral  femoral veins. Guidewires were advanced into the IVC. In the right femoral vein, 2 sheaths of   8F were placed. In the left femoral vein 2 sheaths of 8F were placed. A deflectable  decapolar catheter was advanced through the LFV to the coronary sinus. An 8F intracardiac  echo catheter was advanced to the right atrium.     At baseline atrial flutter was induced, TCL 300ms. LAT mapping confirmed CTI flutter. Ablation was performed at 35W along the CTI resulting in block >200ms.    Through one of the 8F short sheaths in the RFV,  a long wire was advanced to the SVC. The short sheath was  changed out for an 8.5 F braided Tor-flex ([employer]) sheath which was advanced to the SVC. The wire was  removed and the dilator was flushed. A 71-cm transseptal needle ([employer]) was advanced to the tip of the  dilator, and the obturator was removed. The transseptal needle was attached  to the manifold and  flushed. Under intracardiac echocardiographic guidance, the sheath/needle   system was withdrawn to the fossa ovalis. At this time, 15,000 units of heparin were administered.   Additional boluses of heparin were given as needed to keep -350 sec during LA dwell time.   The needle was advanced out of the dilator, and RF energy applied via the [employer] needle.   ICE visualized the needle passage through the fossa ovalis into the left  atrium. Confirmation of left atrial location was confirmed by injecting saline and transducing  pressure. The dilator was advanced over the needle into the left atrium, and then the sheath was advanced over the dilator. The dilator and  needle were removed. The sheath was flushed and connected to a continuous heparinized   saline drip.  The transseptal catheterization  procedure was repeated through the second RFV access site a 98-cm transseptal needle  and a medium-curl Vizigo ([employer]) sheath. Left atrial location was again confirmed by injecting saline  and transducing pressure. The needle and dilator were removed. The sheath was attached to  the drip line and flushed.     A 3.5-mm externally-irrigated ablation catheter ([employer]   Thermocool ST SF DF-curve) was advanced through the Vizigo sheath into the left atrium. A duodecapolar Penta-Ray catheter ([employer]) was  advanced through the Tor-flex sheath into the left atrium. A 3-dimensional electroanatomical  map was constructed using the CARTO system.    Attention was first turned to the left pulmonary veins. The Penta-Ray  catheter was placed in the LSPV and LIPV which showed conduction into the veins. The catheters were then moved to the RSPV and RIPV which showed evidence of conduction into the veins. The posterior wall was evaluated as an additional target for treatment of atrial fibrillation. Ablation was performed along the anterior aspects of the LPVs and RPVs, with additional  roof and floor lines, 20-40W, resulting in isolation of the LPVs, RPVs, and posterior wall.  Power was reduced near the esophagus.  The Penta-Ray was advanced into all four pulmonary veins and persistent conduction block into the right and left pulmonary veins in addition to posterior wall was demonstrated.     The ablation and Penta-ray catheters were placed simultaneously in the left and right veins, and adenosine 12mg was given. There was no acute reconnection however with time we did note the RPVs were beginning to reconnect posteriorly and further ablation was performed along the posterior chinedu and RIPV resulting in isolation.     Isuprel 2-4mcg/min was administered and burst pacing was performed in the left atrium without induction of sustained AF, AFL, or SVT.     The left atrial sheaths and catheters were withdrawn to the right atrium. The CTI was noted to have reconnected. Ablation at 40W was performed at the site of leak resulting in block >200ms.    ICE visualization   of the pericardium confirmed no pericardial effusion at the end of the case. The  patient was awakened from anesthesia, catheters and sheaths were removed, Vascade MVP closure devices were deployed, and manual  pressure was held on bilateral groins until hemostasis was achieved.    Electrophysiological Findings:  Sinus cycle length 1060 msec  Intervals-   H-V 40 msec   msec   msec   msec  AV block  ms  AVERP: <AERP 500/280 ms    Total RF time: 1578 sec  Fluoro time: 0 min      Estimated blood loss - 30 mL    Complications: None    Impression:  1. Atrial fibrillation, persistent  2. Successful isolation of all four pulmonary veins. Successful posterior wall isolation for additional ablation of atrial fibrillation  3. Typical atrial flutter  4. Successful CTI ablation for treatment of atrial flutter    Recommendations:  1. Bed rest for 2 hours  2. Telemetry monitoring during recovery  3. Anticoagulant therapy for at  least 6-12 months  4. Follow up in Arrhythmia clinic in 4 weeks. Stop amiodarone after 4 weeks.      Castro Montejo MD  Cardiac Electrophysiology

## 2022-09-26 NOTE — DISCHARGE INSTRUCTIONS
HOME CARE INSTRUCTIONS    ACTIVITY: Rest and take it easy for the first 24 hours.  A responsible adult is recommended to remain with you during that time.  It is normal to feel sleepy.  We encourage you to not do anything that requires balance, judgment or coordination.    FOR 24 HOURS DO NOT:  Drive, operate machinery or run household appliances.  Drink beer or alcoholic beverages.  Make important decisions or sign legal documents.    SPECIAL INSTRUCTIONS:  [facility] Post Ablation Patient Instructions:  No lifting > 10 lbs x 1 week.      No soaking in baths, hot tubs, pools x 1 week.  May shower the day after discharge and take off groin dressings and leave  sites uncovered.  Continue to monitor sites daily for warmth, redness, discolored drainage.  It is common to have a small lump in the area where the cather was (usually the size of a marble); this will go away but takes approximately 6 weeks to normalize.     3.   Please take all medications as prescribed to you; please do not stop any medications prescribed post ablation unless directed by your healthcare provider.      4.   Please do not miss any doses of your blood thinner (if you have been started on, or take chronic blood thinners) without discussion with your healthcare provider first.     5.   Please walk and take deep breaths after discharge.  After discharge, if you experience neurological changes/signs of stroke or high fever you should be seen in the emergency dept.     6.   It is possible you may experience some chest discomfort or chest tightness post ablation.  This is usually secondary to inflammation and irritation of the tissues at the area of the ablation.  If this occurs, it is advised to try 400 mg of Ibuprofen with food as needed up to three times a day for a maximum of two days.  This should help to decrease pain and tissue inflammation.          **Please notify the office ([phone]) if this occurs.          ** DO NOT TAKE Ibuprofen IF HISTORY OF ALLERGY, SIGNIFICANT BLEEDING OR KIDNEY DISEASE WITHOUT DISCUSSING WITH YOUR CARDIOLOGY PROVIDER FIRST.          ** If pain becomes severe or you have additional symptoms you may need to be medically evaluated; please contact the cardiology office ([phone]) for further direction.     7. It is possible that you may experience arrhythmia/Atrial Fibrillation post ablation.  This is secondary to irritation and inflammation of the cardiac tissues from the ablation.  If you have atrial fibrillation all day or feel poorly with it, please notify your cardiologist's via phone ([phone]) or [email or web address].      8.  Please contact call our office ([phone]) or message via [employer] message if you have any questions or concerns post procedurally.    9. You need to be seen for post ablation follow up 3-4 weeks post procedure. An appointment is scheduled for you.  Please contact the office ([phone]) if you need to change your appointment.      DIET: To avoid nausea, slowly advance diet as tolerated, avoiding spicy or greasy foods for the first day.  Add more substantial food to your diet according to your physician's instructions.  Babies can be fed formula or breast milk as soon as they are hungry.  INCREASE FLUIDS AND FIBER TO AVOID CONSTIPATION.      MEDICATIONS: Resume taking daily medication.  Take prescribed pain medication with food.  If no medication is prescribed, you may take non-aspirin pain medication if needed.  PAIN MEDICATION CAN BE VERY CONSTIPATING.  Take a stool softener or laxative such as senokot, pericolace, or milk of magnesia if needed.    A follow-up appointment should be arranged with your doctor in 3-4 weeks; call to schedule.    You should CALL YOUR PHYSICIAN if you develop:  Fever greater than 101 degrees F.  Pain not relieved by medication, or persistent nausea or vomiting.  Excessive bleeding (blood soaking through dressing) or unexpected  drainage from the wound.  Extreme redness or swelling around the incision site, drainage of pus or foul smelling drainage.  Inability to urinate or empty your bladder within 8 hours.  Problems with breathing or chest pain.    You should call 911 if you develop problems with breathing or chest pain.  If you are unable to contact your doctor or surgical center, you should go to the nearest emergency room or urgent care center.  Physician's telephone #: [phone]    MILD FLU-LIKE SYMPTOMS ARE NORMAL.  YOU MAY EXPERIENCE GENERALIZED MUSCLE ACHES, THROAT IRRITATION, HEADACHE AND/OR SOME NAUSEA.    If any questions arise, call your doctor.  If your doctor is not available, please feel free to call the Surgical Center at [phone].  The Center is open Monday through Friday from 7AM to 7PM.      A registered nurse may call you a few days after your surgery to see how you are doing after your procedure.    You may also receive a survey in the mail within the next two weeks and we ask that you take a few moments to complete the survey and return it to us.  Our goal is to provide you with very good care and we value your comments.     Depression / Suicide Risk    As you are discharged from this [facility] facility, it is important to learn how to keep safe from harming yourself.    Recognize the warning signs:  Abrupt changes in personality, positive or negative- including increase in energy   Giving away possessions  Change in eating patterns- significant weight changes-  positive or negative  Change in sleeping patterns- unable to sleep or sleeping all the time   Unwillingness or inability to communicate  Depression  Unusual sadness, discouragement and loneliness  Talk of wanting to die  Neglect of personal appearance   Rebelliousness- reckless behavior  Withdrawal from people/activities they love  Confusion- inability to concentrate     If you or a loved one observes any of these behaviors or has concerns  about self-harm, here's what you can do:  Talk about it- your feelings and reasons for harming yourself  Remove any means that you might use to hurt yourself (examples: pills, rope, extension cords, firearm)  Get professional help from the community (Mental Health, Substance Abuse, psychological counseling)  Do not be alone:Call your Safe Contact- someone whom you trust who will be there for you.  Call your local CRISIS HOTLINE 527-6573 or [phone]  Call your local Children's Mobile Crisis Response Team [place] (235) 272-5710 or www.[employer]  Call the toll free National Suicide Prevention Hotlines   National Suicide Prevention Lifeline 952-977-DZFL (7547)  National Hope Line Network 800-SUICIDE (726-7444)    I acknowledge receipt and understanding of these Home Care instructions.

## 2022-09-26 NOTE — ANESTHESIA PREPROCEDURE EVALUATION
Date/Time: 09/26/22 0800    Scheduled providers: Castro Montejo M.D.    Procedure: CL-EP ABLATION ATRIAL FIBRILLATION    Diagnosis:       PAF (paroxysmal atrial fibrillation) ([facility]) [I48.0]      Paroxysmal atrial fibrillation [I48.0]    Indications: See Associated Dx    Location: [facility] IMAG[facility] - CATH LAB [facility]      Paroxysnmal AFIB  EF 25%  No anesthesia problems  NPO  Doxycyline allergy    Relevant Problems   NEURO   (positive) History of non-ST elevation myocardial infarction (NSTEMI)      CARDIAC   (positive) Coronary artery disease, non-occlusive   (positive) PAF (paroxysmal atrial fibrillation) ([facility])   (positive) Persistent atrial fibrillation ([facility])   (positive) SVT (supraventricular tachycardia) ([facility])      ENDO   (positive) Hypothyroidism   (positive) Type 2 diabetes mellitus without complication ([facility])       Physical Exam    Airway   Mallampati: II  TM distance: >3 FB  Neck ROM: full       Cardiovascular - normal exam  Rhythm: regular  Rate: normal     Dental - normal exam           Pulmonary - normal exam     Abdominal    Neurological - normal exam                 Anesthesia Plan    ASA 4   ASA physical status 4 criteria: severe reduction of ejection fractions    Plan - general       Airway plan will be ETT          Induction: intravenous    Postoperative Plan: Postoperative administration of opioids is intended.    Pertinent diagnostic labs and testing reviewed    Informed Consent:    Anesthetic plan and risks discussed with patient.    Use of blood products discussed with: patient whom consented to blood products.

## 2022-09-26 NOTE — ANESTHESIA PROCEDURE NOTES
Airway    Date/Time: 9/26/2022 7:59 AM  Performed by: Bobby Nesbitt M.D.  Authorized by: Bobby Nesbitt M.D.     Location:  OR  Urgency:  Elective  Indications for Airway Management:  Anesthesia      Spontaneous Ventilation: absent    Sedation Level:  Deep  Preoxygenated: Yes    Patient Position:  Sniffing  Final Airway Type:  Endotracheal airway  Final Endotracheal Airway:  ETT  Cuffed: Yes    Technique Used for Successful ETT Placement:  Direct laryngoscopy    Insertion Site:  Oral  Blade Type:  [place]  Laryngoscope Blade/Videolaryngoscope Blade Size:  3  ETT Size (mm):  7.0  Measured from:  Teeth  ETT to Teeth (cm):  23  Placement Verified by: auscultation and capnometry    Cormack-Lehane Classification:  Grade IIa - partial view of glottis  Number of Attempts at Approach:  1

## 2022-09-26 NOTE — OR NURSING
1200 Pt arrived to Phase 2, Report received.   Pt's bilateral groin sites are soft, clean, dry, and intact. VSS, denies pain.     1220 NP at bedside     1232 Pt up to chair, steady gait. Box lunch given.

## 2022-10-03 LAB — EKG IMPRESSION: NORMAL

## 2022-10-13 ENCOUNTER — OFFICE VISIT (OUTPATIENT)
Dept: CARDIOLOGY | Facility: PHYSICIAN GROUP | Age: 62
End: 2022-10-13
Payer: COMMERCIAL

## 2022-10-13 VITALS
BODY MASS INDEX: 34.66 KG/M2 | RESPIRATION RATE: 16 BRPM | DIASTOLIC BLOOD PRESSURE: 64 MMHG | WEIGHT: 234 LBS | HEIGHT: 69 IN | OXYGEN SATURATION: 95 % | SYSTOLIC BLOOD PRESSURE: 90 MMHG | HEART RATE: 60 BPM

## 2022-10-13 DIAGNOSIS — Z79.899 ENCOUNTER FOR MONITORING AMIODARONE THERAPY: ICD-10-CM

## 2022-10-13 DIAGNOSIS — Z98.890 H/O CARDIAC RADIOFREQUENCY ABLATION: ICD-10-CM

## 2022-10-13 DIAGNOSIS — I47.10 SVT (SUPRAVENTRICULAR TACHYCARDIA) (HCC): ICD-10-CM

## 2022-10-13 DIAGNOSIS — I48.0 PAF (PAROXYSMAL ATRIAL FIBRILLATION) (HCC): ICD-10-CM

## 2022-10-13 DIAGNOSIS — I25.2 HISTORY OF NON-ST ELEVATION MYOCARDIAL INFARCTION (NSTEMI): ICD-10-CM

## 2022-10-13 DIAGNOSIS — Q23.1 BICUSPID AORTIC VALVE: Chronic | ICD-10-CM

## 2022-10-13 DIAGNOSIS — Z79.01 CHRONIC ANTICOAGULATION: Chronic | ICD-10-CM

## 2022-10-13 DIAGNOSIS — I48.19 PERSISTENT ATRIAL FIBRILLATION (HCC): ICD-10-CM

## 2022-10-13 DIAGNOSIS — I25.10 CORONARY ARTERY DISEASE, NON-OCCLUSIVE: ICD-10-CM

## 2022-10-13 DIAGNOSIS — Z51.81 ENCOUNTER FOR MONITORING AMIODARONE THERAPY: ICD-10-CM

## 2022-10-13 DIAGNOSIS — I42.8 OTHER CARDIOMYOPATHY (HCC): ICD-10-CM

## 2022-10-13 PROCEDURE — 99214 OFFICE O/P EST MOD 30 MIN: CPT | Performed by: INTERNAL MEDICINE

## 2022-10-13 RX ORDER — METOPROLOL SUCCINATE 100 MG/1
100 TABLET, EXTENDED RELEASE ORAL DAILY
Qty: 90 TABLET | Refills: 3 | Status: SHIPPED | OUTPATIENT
Start: 2022-10-13 | End: 2023-11-06

## 2022-10-13 ASSESSMENT — ENCOUNTER SYMPTOMS
CHILLS: 0
SHORTNESS OF BREATH: 0
CLAUDICATION: 0
COUGH: 0
PALPITATIONS: 0
ABDOMINAL PAIN: 0
DIZZINESS: 1
PND: 0
FEVER: 0
WEAKNESS: 0
FALLS: 0
FOCAL WEAKNESS: 0
BLURRED VISION: 0
SORE THROAT: 0
NAUSEA: 0
BRUISES/BLEEDS EASILY: 0

## 2022-10-13 ASSESSMENT — FIBROSIS 4 INDEX: FIB4 SCORE: 1.76

## 2022-10-13 NOTE — PROGRESS NOTES
Chief Complaint   Patient presents with    Atrial Fibrillation     F/V Dx: PAF (paroxysmal atrial fibrillation) ([facility])    Coronary Artery Disease     F/V Dx: Coronary artery disease, non-occlusive    Palpitations       Subjective     Gisel Montez is a 62 y.o. female who presents today for follow-up of her history of heart failure with persistent atrial fibrillation now status post ablation and on amiodarone therapy    She does have some dizziness with the metoprolol and amiodarone    She is continues to check her cardia device and has had some indeterminate rhythms likely PACs on her review    Her TOÑO shows a bicuspid aortic valve      Past Medical History:   Diagnosis Date    ACTIVE SMOKER      4-10 sticks/40    Acute bronchitis due to infection 10/3/2016    Acute MI, inferior wall (HCC)      CAD (coronary artery disease)      Cardiomyopathy ([facility])     Chronic anticoagulation     Class 2 obesity due to excess calories without serious comorbidity in adult 9/10/2018    Hyperglycemia 10/31/2016    Hypothyroidism      PAF (paroxysmal atrial fibrillation) ([facility])      Palpitations      Type 2 diabetes mellitus without complication ([facility]) 2017    Vitamin D deficiency 2017     Past Surgical History:   Procedure Laterality Date    GYN SURGERY      tubal ligation    OTHER      choleycystectomy    OTHER CARDIAC SURGERY      cardioversion    SEPTAL RECONSTRUCTION       Family History   Problem Relation Age of Onset    Diabetes Father 68     Social History     Socioeconomic History    Marital status:      Spouse name: Not on file    Number of children: Not on file    Years of education: Not on file    Highest education level: Not on file   Occupational History    Occupation: other     Comment: [employer]   Tobacco Use    Smoking status: Former     Packs/day: 0.50     Years: 35.00     Pack years: 17.50     Types: Cigarettes     Quit date: 2015     Years since quittin.3    Smokeless tobacco: Never     "Tobacco comments:     occ vape   Vaping Use    Vaping Use: Some days    Substances: Nicotine, 1.8 occasionally   Substance and Sexual Activity    Alcohol use: Yes     Alcohol/week: 3.6 oz     Types: 2 Glasses of wine, 4 Cans of beer per week     Comment: on weekends    Drug use: No    Sexual activity: Yes     Partners: Male   Other Topics Concern    Not on file   Social History Narrative    . Lives in [place], [place]. Works for [employer].     Social Determinants of Health     Financial Resource Strain: Not on file   Food Insecurity: Not on file   Transportation Needs: Not on file   Physical Activity: Not on file   Stress: Not on file   Social Connections: Not on file   Intimate Partner Violence: Not on file   Housing Stability: Not on file     Allergies   Allergen Reactions    Other Food      \"CELERY\". Became violently ill, tongue swelled and hard to breath.    Doxycycline      Myocarditis     Outpatient Encounter Medications as of 10/13/2022   Medication Sig Dispense Refill    rivaroxaban (XARELTO) 20 MG Tab tablet Take 1 Tablet by mouth with dinner. 90 Tablet 3    metoprolol SR (TOPROL XL) 100 MG TABLET SR 24 HR Take 1 Tablet by mouth every day. 90 Tablet 3    metFORMIN (GLUCOPHAGE) 500 MG Tab Take 500 mg by mouth 2 times a day with meals.      omeprazole (PRILOSEC) 20 MG delayed-release capsule Take 1 Capsule by mouth every day. Take for daily prior to breakfast for 30 days post ablation without missed doses 30 Capsule 0    cetirizine (ZYRTEC) 10 MG Tab Take 10 mg by mouth every day.      Multiple Vitamins-Minerals (HAIR SKIN NAILS PO) Take 1 Tablet by mouth every day.      amiodarone (CORDARONE) 200 MG Tab Take 1 Tablet by mouth every day. 90 Tablet 3    levothyroxine (SYNTHROID) 112 MCG Tab Take 1 Tab by mouth every morning before breakfast. 30 Tab 2    CINNAMON PO Take 2 Capsule by mouth every day.      vitamin D (CHOLECALCIFEROL) 1000 UNIT TABS Take 1,000 Units by mouth every day.      [DISCONTINUED] " "metoprolol SR (TOPROL XL) 100 MG TABLET SR 24 HR Take 1 Tablet by mouth every day. Please call [phone] to schedule follow up appointment for further refills, thank you. 90 Tablet 3    [DISCONTINUED] rivaroxaban (XARELTO) 20 MG Tab tablet Take 1 Tablet by mouth with dinner. 90 Tablet 3     No facility-administered encounter medications on file as of 10/13/2022.     Review of Systems   Constitutional:  Negative for chills and fever.   HENT:  Negative for sore throat.    Eyes:  Negative for blurred vision.   Respiratory:  Negative for cough and shortness of breath.    Cardiovascular:  Negative for chest pain, palpitations, claudication, leg swelling and PND.   Gastrointestinal:  Negative for abdominal pain and nausea.   Musculoskeletal:  Negative for falls and joint pain.   Skin:  Negative for rash.   Neurological:  Positive for dizziness. Negative for focal weakness and weakness.   Endo/Heme/Allergies:  Does not bruise/bleed easily.            Objective     BP (!) 90/64 (BP Location: Left arm, Patient Position: Sitting, BP Cuff Size: Adult)   Pulse 60   Resp 16   Ht 1.753 m (5' 9\")   Wt 106 kg (234 lb)   LMP 10/07/2014   SpO2 95%   BMI 34.56 kg/m²     Physical Exam  Constitutional:       General: She is not in acute distress.     Appearance: She is not diaphoretic.   HENT:      Mouth/Throat:      Comments: Wearing a mask for COVID protocol  Eyes:      General: No scleral icterus.  Neck:      Vascular: No JVD.   Cardiovascular:      Rate and Rhythm: Normal rate.      Heart sounds: Normal heart sounds. No murmur heard.    No friction rub. No gallop.   Pulmonary:      Effort: No respiratory distress.      Breath sounds: No wheezing or rales.   Abdominal:      General: Bowel sounds are normal.      Palpations: Abdomen is soft.   Musculoskeletal:      Right lower leg: No edema.      Left lower leg: No edema.   Skin:     Findings: No rash.   Neurological:      Mental Status: She is alert. Mental status is at " baseline.   Psychiatric:         Mood and Affect: Mood normal.          We reviewed in person the most recent labs  Recent Results (from the past 5040 hour(s))   HEMOGLOBIN A1C    Collection Time: 22 12:00 AM   Result Value Ref Range    Glycohemoglobin 6.2 (A) 0.0 - 5.6 %   ECG    Collection Time: 22 11:24 AM   Result Value Ref Range    Report       [facility]    Test Date:  2022  Pt Name:    ADRIANE BORJA                  Department: [facility]  MRN:        [id number]                      Room:       [facility]  Gender:     Female                       Technician: [facility]  :        1960                   Requested By:MARI POON  Order #:    [id number]                    Reading MD: Mo Arguelles MD    Measurements  Intervals                                Axis  Rate:       110                          P:  ID:                                      QRS:        -27  QRSD:       92                           T:          75  QT:         363  QTc:        492    Interpretive Statements  ATRIAL FIBRILLATION WITH RVR  INFERIOR INFARCT, OLD  Electronically Signed On 2022 15:16:58 PDT by Mo Arguelles MD     Prothrombin Time (INR) (plasma)    Collection Time: 22 11:35 AM   Result Value Ref Range    PT 19.4 (H) 12.0 - 14.6 sec    INR 1.68 (H) 0.87 - 1.13   Basic Metabolic Panel    Collection Time: 22 11:35 AM   Result Value Ref Range    Sodium 137 135 - 145 mmol/L    Potassium 4.9 3.6 - 5.5 mmol/L    Chloride 105 96 - 112 mmol/L    Co2 20 20 - 33 mmol/L    Glucose 109 (H) 65 - 99 mg/dL    Bun 12 8 - 22 mg/dL    Creatinine 0.66 0.50 - 1.40 mg/dL    Calcium 9.6 8.5 - 10.5 mg/dL    Anion Gap 12.0 7.0 - 16.0   CBC Without Differential    Collection Time: 22 11:35 AM   Result Value Ref Range    WBC 5.7 4.8 - 10.8 K/uL    RBC 5.25 4.20 - 5.40 M/uL    Hemoglobin 16.1 (H) 12.0 - 16.0 g/dL    Hematocrit 47.5 (H) 37.0 - 47.0 %    MCV 90.5 81.4 - 97.8 fL    MCH 30.7 27.0 - 33.0 pg     MCHC 33.9 33.6 - 35.0 g/dL    RDW 45.8 35.9 - 50.0 fL    Platelet Count 171 164 - 446 K/uL    MPV 11.3 9.0 - 12.9 fL   ESTIMATED GFR    Collection Time: 22 11:35 AM   Result Value Ref Range    GFR (CKD-EPI) 100 >60 mL/min/1.73 m 2   POCT glucose device results    Collection Time: 22 11:37 AM   Result Value Ref Range    POC Glucose, Blood 107 (H) 65 - 99 mg/dL   EKG    Collection Time: 22  2:01 PM   Result Value Ref Range    Report       [facility]    Test Date:  2022  Pt Name:    ADRIANE BORJA                  Department: [facility]  MRN:        [id number]                      Room:       [facility]  Gender:     Female                       Technician: YUMIKO  :        1960                   Requested By:LUIS CARLOS SERRANO  Order #:    [id number]                    Reading MD: Mo Arguelles MD    Measurements  Intervals                                Axis  Rate:       63                           P:          54  VA:         208                          QRS:        -34  QRSD:       94                           T:          93  QT:         416  QTc:        426    Interpretive Statements  SINUS RHYTHM  FIRST DEGREE AV BLOCK  MULTIPLE ATRIAL PREMATURE COMPLEXES  LEFT ATRIAL ABNORMALITY  LEFT AXIS DEVIATION  EARLY PRECORDIAL R/S TRANSITION  INFERIOR INFARCT, OLD  NONSPECIFIC T ABNORMALITIES, LATERAL LEADS  Electronically Signed On 2022 15:17:54 PDT by Mo Arguelles MD     EKG    Collection Time: 22 12:55 PM   Result Value Ref Range    Report       [facility]    Test Date:  2022  Pt Name:    ADRIANE BORJA                  Department: [facility]  MRN:        [id number]                      Room:  Gender:     Female                       Technician: ANA  :        1960                   Requested By:FABIANO MONTEJO  Order #:    [id number]                    Reading MD: Fabiano Montejo MD    Measurements  Intervals                                Axis  Rate:       59                            P:          68  KS:         200                          QRS:        10  QRSD:       79                           T:          46  QT:         614  QTc:        609    Interpretive Statements  Sinus bradycardia  ABNRM R PROG, CONSIDER ASMI OR LEAD PLACEMENT  Prolonged QT interval  Electronically Signed On 10-3-2022 17:27:00 PDT by Castro Montejo MD     POCT glucose device results    Collection Time: 09/26/22  6:47 AM   Result Value Ref Range    POC Glucose, Blood 116 (H) 65 - 99 mg/dL   COMP METABOLIC PANEL    Collection Time: 09/26/22  6:48 AM   Result Value Ref Range    Sodium 137 135 - 145 mmol/L    Potassium 4.0 3.6 - 5.5 mmol/L    Chloride 103 96 - 112 mmol/L    Co2 21 20 - 33 mmol/L    Anion Gap 13.0 7.0 - 16.0    Glucose 113 (H) 65 - 99 mg/dL    Bun 14 8 - 22 mg/dL    Creatinine 0.64 0.50 - 1.40 mg/dL    Calcium 9.2 8.5 - 10.5 mg/dL    AST(SGOT) 28 12 - 45 U/L    ALT(SGPT) 26 2 - 50 U/L    Alkaline Phosphatase 99 30 - 99 U/L    Total Bilirubin 0.4 0.1 - 1.5 mg/dL    Albumin 3.9 3.2 - 4.9 g/dL    Total Protein 8.1 6.0 - 8.2 g/dL    Globulin 4.2 (H) 1.9 - 3.5 g/dL    A-G Ratio 0.9 g/dL   PT    Collection Time: 09/26/22  6:48 AM   Result Value Ref Range    PT 20.7 (H) 12.0 - 14.6 sec    INR 1.83 (H) 0.87 - 1.13   CBC WITH DIFFERENTIAL    Collection Time: 09/26/22  6:48 AM   Result Value Ref Range    WBC 5.7 4.8 - 10.8 K/uL    RBC 4.97 4.20 - 5.40 M/uL    Hemoglobin 15.6 12.0 - 16.0 g/dL    Hematocrit 45.9 37.0 - 47.0 %    MCV 92.4 81.4 - 97.8 fL    MCH 31.4 27.0 - 33.0 pg    MCHC 34.0 33.6 - 35.0 g/dL    RDW 48.7 35.9 - 50.0 fL    Platelet Count 190 164 - 446 K/uL    MPV 10.8 9.0 - 12.9 fL    Neutrophils-Polys 52.10 44.00 - 72.00 %    Lymphocytes 37.90 22.00 - 41.00 %    Monocytes 7.70 0.00 - 13.40 %    Eosinophils 1.40 0.00 - 6.90 %    Basophils 0.70 0.00 - 1.80 %    Immature Granulocytes 0.20 0.00 - 0.90 %    Nucleated RBC 0.00 /100 WBC    Neutrophils (Absolute) 2.98 2.00 - 7.15 K/uL     Lymphs (Absolute) 2.17 1.00 - 4.80 K/uL    Monos (Absolute) 0.44 0.00 - 0.85 K/uL    Eos (Absolute) 0.08 0.00 - 0.51 K/uL    Baso (Absolute) 0.04 0.00 - 0.12 K/uL    Immature Granulocytes (abs) 0.01 0.00 - 0.11 K/uL    NRBC (Absolute) 0.00 K/uL   ESTIMATED GFR    Collection Time: 22  6:48 AM   Result Value Ref Range    GFR (CKD-EPI) 100 >60 mL/min/1.73 m 2   EKG    Collection Time: 22  7:09 AM   Result Value Ref Range    Report       [facility]    Test Date:  2022  Pt Name:    ADRIANE BORJA                  Department: [facility]  MRN:        [id number]                      Room:       [facility]  Gender:     Female                       Technician: [facility]  :        1960                   Requested By:FABIANO SULLIVAN  Order #:    [id number]                    Reading MD: Antonio House MD    Measurements  Intervals                                Axis  Rate:       66                           P:          153  KS:         208                          QRS:        -26  QRSD:       91                           T:          13  QT:         574  QTc:        602    Interpretive Statements  SINUS RHYTHM  ECTOPIC ATRIAL BEATS  ATRIAL PREMATURE COMPLEXES WITH/WITHOUT ABERRANCY  PROBABLE INFERIOR INFARCT, AGE INDETERMINATE  Prolonged QT interval  Electronically Signed On 2022 8:52:38 PDT by Antonio House MD     POCT activated clotting time device results    Collection Time: 22  8:57 AM   Result Value Ref Range    Istat Activated Clotting Time 376 (H) 74 - 137 sec   POCT activated clotting time device results    Collection Time: 22  9:29 AM   Result Value Ref Range    Istat Activated Clotting Time 324 (H) 74 - 137 sec   EKG    Collection Time: 22 10:30 AM   Result Value Ref Range    Report       [facility]    Test Date:  2022  Pt Name:    ADRIANEKITTY BORJA                  Department: [facility]  MRN:        [id number]                      Room:       [facility]  Gender:     Female                        Technician: [facility]  :        1960                   Requested By:FABIANO SULLIVAN  Order #:    [id number]                    Reading MD: Antonio House MD    Measurements  Intervals                                Axis  Rate:       53                           P:          63  VT:         209                          QRS:        -32  QRSD:       104                          T:          -7  QT:         562  QTc:        528    Interpretive Statements  Sinus bradycardia  INFERIOR INFARCT, AGE INDETERMINATE  Prolonged QT interval  Electronically Signed On 2022 14:34:56 PDT by Antonio House MD       We reviewed the images of her TOÑO function does look improved compared to her transthoracic on amiodarone    Assessment & Plan     1. Persistent atrial fibrillation (HCC)  EC-ECHOCARDIOGRAM COMPLETE W/O CONT    EC-ECHOCARDIOGRAM COMPLETE W/O CONT      2. Other cardiomyopathy (HCC)  EC-ECHOCARDIOGRAM COMPLETE W/O CONT    EC-ECHOCARDIOGRAM COMPLETE W/O CONT      3. PAF (paroxysmal atrial fibrillation) (HCC)  metoprolol SR (TOPROL XL) 100 MG TABLET SR 24 HR          Medical Decision Making: Today's Assessment/Status/Plan:        It was my pleasure to meet with Ms. Montez.    We addressed the management of hypertension at today's visit. Blood pressure is well controlled.  We specifically assessed the labs on hypertension treatment    We addressed the management of antiarrhythmic medication at today's visit. She understands the risks and benefits of amiodarone therapy. We have specifically made sure that her anticoagulation is appropriate, QTc is appropriate and screened for arrhythmias.   I also stressed the importance of regular labs for thyroid and liver, regular lung exam and regular eye exam. She understands that this is a high risk medication and requires frequent monitoring as well as informing providers for any new medication to make sure it is appropriate for QTC prolongation.      We  addressed the management of atrial fibrillation.  She is on proper anticoagulation cholesterol management and rate or rhythm control as appropriate.  We addressed the potential side effects and laboratory follow-up for these medications.    We addressed the management of chronic anticoagulation at today's visit. She understands the risks and benefits of chronic anticoagulation.  We reviewed and verified the results of annual testing for anemia and kidney function.    We will check an echocardiogram either in [place] or [place] in a couple months to see if she has improvement of ejection fraction otherwise will need to advance her heart failure regimen    I will see Ms. Montez back in 1 year time and encouraged her to follow up with us over the phone or electronically using my MyChart as issues arise.    It is my pleasure to participate in the care of Ms. Montez.  Please do not hesitate to contact me with questions or concerns.    Young Issa MD PhD [facility]  Cardiologist [facility]    Please note that this dictation was created using voice recognition software. There may be errors I did not discover before finalizing the note.

## 2022-10-13 NOTE — PATIENT INSTRUCTIONS
I am a [facility] Cardiologist providing care with [facility].  Please contact [facility] for scheduling and rescheduling at [phone].    For all clinical questions related to your heart care including medications, always call the main [facility] Cardiology office in [place] at [phone].    If you need to see me or Sheela Camargo (Nurse practitioner), call [facility] or use "[employer]".    "[employer]" is the easiest way to communicate.    Call the "[employer]" line to get set up, either on your computer or by downloading an meir on your smart phone.  Keep them on the phone with you until you are sure you can access the meir or website.  If you use your phone, you can set up a finger print to use if you tend to forget passwords.  Call [phone] or [phone].

## 2022-12-01 ENCOUNTER — OFFICE VISIT (OUTPATIENT)
Dept: SLEEP MEDICINE | Facility: MEDICAL CENTER | Age: 62
End: 2022-12-01
Payer: COMMERCIAL

## 2022-12-01 VITALS
RESPIRATION RATE: 12 BRPM | WEIGHT: 238 LBS | BODY MASS INDEX: 35.25 KG/M2 | HEIGHT: 69 IN | OXYGEN SATURATION: 95 % | DIASTOLIC BLOOD PRESSURE: 66 MMHG | SYSTOLIC BLOOD PRESSURE: 106 MMHG | HEART RATE: 57 BPM

## 2022-12-01 DIAGNOSIS — E66.09 CLASS 2 OBESITY DUE TO EXCESS CALORIES WITHOUT SERIOUS COMORBIDITY WITH BODY MASS INDEX (BMI) OF 35.0 TO 35.9 IN ADULT: ICD-10-CM

## 2022-12-01 DIAGNOSIS — G47.00 FREQUENT NOCTURNAL AWAKENING: ICD-10-CM

## 2022-12-01 DIAGNOSIS — I48.0 PAF (PAROXYSMAL ATRIAL FIBRILLATION) (HCC): ICD-10-CM

## 2022-12-01 DIAGNOSIS — E11.9 TYPE 2 DIABETES MELLITUS WITHOUT COMPLICATION, WITHOUT LONG-TERM CURRENT USE OF INSULIN (HCC): ICD-10-CM

## 2022-12-01 DIAGNOSIS — G47.30 SLEEP DISORDER BREATHING: Primary | ICD-10-CM

## 2022-12-01 PROCEDURE — 99204 OFFICE O/P NEW MOD 45 MIN: CPT | Performed by: STUDENT IN AN ORGANIZED HEALTH CARE EDUCATION/TRAINING PROGRAM

## 2022-12-01 ASSESSMENT — FIBROSIS 4 INDEX: FIB4 SCORE: 1.76

## 2022-12-01 ASSESSMENT — PATIENT HEALTH QUESTIONNAIRE - PHQ9: CLINICAL INTERPRETATION OF PHQ2 SCORE: 0

## 2022-12-01 NOTE — PROGRESS NOTES
[facility] Sleep Center Consult Note     Date: 12/1/2022 / Time: 10:08 AM      Thank you for requesting a sleep medicine consultation on Gisel Montez at the sleep center. Presents today with the chief complaints of snoring and atrial fibrillation. She is referred by Young Issa M.D.  [address]  [address]  [place] [phone] for evaluation and treatment of sleep disorder breathing.     HISTORY OF PRESENT ILLNESS:     Gisel Montez is a 62 y.o. female with hypothyroidism, CAD status post MI, type 2 diabetes mellitus, paroxysmal atrial fibrillation status post cardioversion and ablation, and obesity.  Presents to Sleep Clinic for evaluation of sleep.     She reports what led her to today's visit is her cardiologist concern regarding her potential for sleep apnea and its lead to atrial fibrillation.  She first developed atrial fibrillation after a myocardial infarction in 2013.  Following this she did revert back to sinus rhythm and was in sinus rhythm for a number of years.  She had her atrial fibrillation occur again in the late summer of this year.  She underwent cardioversion which did not convert her back to sinus rhythm.  She therefore underwent ablation at the end of September.  Believes she has been in sinus rhythm since her ablation in September.  She is continuing to follow with cardiology taking her rhythm control medications along with Xarelto.    In terms of her sleep she generally has no trouble falling asleep or staying asleep.  She has been told she snores in her sleep.  She feels her snoring has slightly improved over the years.  She does awaken 2-4 times a night.  She will have difficulty getting back to sleep 2-3 nights a week with it taking longer than 30 minutes.  She does find her sleep restorative.  She does wake up with a dry mouth at times.  She does grind her teeth at times.  She will occasionally have night sweats.    As per supplemental questionnaire to be scanned or  "imported into chart:    Berlin Sleepiness Score: 7    Sleep Schedule  Bedtime: Weekday and weekend 10pm  Wake time: Weekday and weekend 6am  Sleep-onset latency: 10min   Awakenings from sleep: 2-4  Difficulty falling back asleep: sometimes 2-3 nights a week takes longer than 30 min   Bedroom partner: yes   Naps: No     DAYTIME SYMPTOMS:   Excessive daytime sleepiness: No   Daytime fatigue: Yes  Difficulty concentrating: Yes, sometimes   Memory problems: No   Irritability:Yes, sometimes  Work/school performance issues: No   Sleepiness with driving: No   Caffeine/stimulant use: Yes  Alcohol use:Yes, How Many? On weekends 1-2 drinks      SLEEP RELATED SYMPTOMS  Snoring: Yes  Witnessed apnea or gasping/choking: No   Dry mouth or mouth breathing: Yes  Sweating: Yes  Teeth grinding/biting: Yes  Morning headaches: No   Refreshed Upon Awakening: Yes     SLEEP RELATED BEHAVIORS:  Parasomnias (walking, talking, eating, violence): No   Leg kicking: No   Restless legs - \"urge to move\": No   Nightmares: No  Recurrent: No   Dream enactment: No      NARCOLEPSY:  Cataplexy: No   Sleep paralysis: No   Sleep attacks: No   Hypnagogic/hypnopompic hallucinations: No     MEDICAL HISTORY  Past Medical History:   Diagnosis Date    ACTIVE SMOKER      4-10 sticks/40    Acute bronchitis due to infection 10/03/2016    Acute MI, inferior wall (HCC)      Atrial fibrillation (HCC)     Bicuspid aortic valve - on TOÑO 9/2022     CAD (coronary artery disease)      Cardiomyopathy (HCC)     Chickenpox     Chronic anticoagulation     Class 2 obesity due to excess calories without serious comorbidity in adult 09/10/2018    Hyperglycemia 10/31/2016    Hypothyroidism      Mumps     Obesity     PAF (paroxysmal atrial fibrillation) (HCC)      Palpitations      Tonsillitis     Type 2 diabetes mellitus without complication (HCC) 08/28/2017    Vitamin D deficiency 02/27/2017    Wears glasses         SURGICAL HISTORY  Past Surgical History:   Procedure " Laterality Date    GYN SURGERY      tubal ligation    OTHER      choleycystectomy    OTHER CARDIAC SURGERY      cardioversion    SEPTAL RECONSTRUCTION          FAMILY HISTORY  Family History   Problem Relation Age of Onset    Diabetes Father 68       SOCIAL HISTORY  Social History     Socioeconomic History    Marital status:    Occupational History    Occupation: other     Comment: [employer]   Tobacco Use    Smoking status: Former     Packs/day: 0.50     Years: 35.00     Pack years: 17.50     Types: Cigarettes     Start date: 1974     Quit date: 2015     Years since quittin.5    Smokeless tobacco: Never    Tobacco comments:     occ vape   Vaping Use    Vaping Use: Some days    Substances: Nicotine, 1.8 occasionally   Substance and Sexual Activity    Alcohol use: Yes     Alcohol/week: 3.6 oz     Types: 2 Glasses of wine, 4 Cans of beer per week     Comment: on weekends    Drug use: No    Sexual activity: Yes     Partners: Male   Social History Narrative    . Lives in [place], [place]. Works for [employer].        Occupation: Homemaker     CURRENT MEDICATIONS  Current Outpatient Medications   Medication Sig Dispense Refill    rivaroxaban (XARELTO) 20 MG Tab tablet Take 1 Tablet by mouth with dinner. 90 Tablet 3    metoprolol SR (TOPROL XL) 100 MG TABLET SR 24 HR Take 1 Tablet by mouth every day. 90 Tablet 3    metFORMIN (GLUCOPHAGE) 500 MG Tab Take 500 mg by mouth 2 times a day with meals.      Multiple Vitamins-Minerals (HAIR SKIN NAILS PO) Take 1 Tablet by mouth every day.      amiodarone (CORDARONE) 200 MG Tab Take 1 Tablet by mouth every day. 90 Tablet 3    levothyroxine (SYNTHROID) 112 MCG Tab Take 1 Tab by mouth every morning before breakfast. 30 Tab 2    CINNAMON PO Take 2 Capsule by mouth every day.      vitamin D (CHOLECALCIFEROL) 1000 UNIT TABS Take 1,000 Units by mouth every day.       No current facility-administered medications for this visit.       REVIEW OF  "SYSTEMS  Constitutional: Denies fevers, Denies weight changes  Ears/Nose/Throat/Mouth: Denies nasal congestion or sore throat   Cardiovascular: Denies chest pain  Respiratory: Denies shortness of breath, Denies cough  Gastrointestinal/Hepatic: Denies nausea, vomiting  Sleep: see HPI    Physical Examination:  Vitals/ General Appearance:   Weight/BMI: Body mass index is 35.15 kg/m².  /66 (BP Location: Left arm, Patient Position: Sitting, BP Cuff Size: Large adult)   Pulse (!) 57   Resp 12   Ht 1.753 m (5' 9\")   Wt 108 kg (238 lb)   SpO2 95%   Vitals:    12/01/22 1006   BP: 106/66   BP Location: Left arm   Patient Position: Sitting   BP Cuff Size: Large adult   Pulse: (!) 57   Resp: 12   SpO2: 95%   Weight: 108 kg (238 lb)   Height: 1.753 m (5' 9\")       Pt. is alert and oriented to time, place and person. Cooperative and in no apparent distress.     Constitutional: Alert, no distress, well-groomed.  Skin: No rashes in visible areas.  Eye: Round. Conjunctiva clear, lids normal. No icterus.   ENT EXAM  Nasal alae/valves collapsible: No   Nasal septum deviation: Yes  Nasal turbinate hypertrophy: Left: Grade 1   Right: Grade 1  Hard palate narrow: No   Hard palate high: No   Soft palate/uvula (Mallampati score): 4  Tongue Scalloping: Yes  Retrognathia: No   Micrognathia: No   Cardiovascular:no murmus/gallops/rubs, normal S1 and S2 heart sounds, regular rate and rhythm  Pulmonary:Clear to auscultation, No wheezes, No crackles.  Neurologic:Awake, alert and oriented x 3, Normal age appropriate gait, No involuntary motions.  Extremities: No clubbing, cyanosis, or edema       ASSESSMENT AND PLAN   Gisel Montez is a 62 y.o. female with hypothyroidism, CAD status post MI, type 2 diabetes mellitus, paroxysmal atrial fibrillation status post cardioversion and ablation, and obesity.  Presents to Sleep Clinic for evaluation of sleep.     1. Gisel Montez  has symptoms of Obstructive Sleep Apnea (ROHIT). Gisel Booker" Tc has symptoms of snoring, night sweats, frequent nocturnal awakenings, dry mouth.   Pt has risk factors for ROHIT include obesity,  age, and crowded oropharynx.     The pathophysiology of ROHIT and the increased risk of cardiovascular morbidity from untreated ROHIT is discussed in detail with the patient. She  also has atrial fibrillation, diabetes mellitus type 2, hypothyroidism, CAD  which can be worsened by ROHIT.      We have discussed diagnostic options including in-laboratory, attended polysomnography and home sleep testing. We have also discussed treatment options including airway pressurization, reconstructive otolaryngologic surgery, dental appliances and weight management.     Subsequently,treatment options will be discussed based on the diagnostic study. Meanwhile, She is urged to avoid supine sleep, weight gain and alcoholic beverages since all of these can worsen ROHIT. She is cautioned against drowsy driving. If She feels sleepy while driving, advised must pull over for a break/nap, rather than persist on the road, in the interest of Pt's own safety and that of others on the road.    Plan  -  She  will be scheduled for an overnight HST [place] to assess sleep related breathing disorder.  - Follow up 1-2 weeks after sleep study to discuss results and treatment options moving forward   -Advised to reach out via MyChart or by phone with any questions or concerns.     2.  Regarding treatment of other past medical problems and general health maintenance,  Pt is urged to follow up with PCP.      Please note portions of this record was created using voice recognition software. I have made every reasonable attempt to correct obvious errors, but I expect that there are errors of grammar and possibly content I did not discover before finalizing the note.

## 2022-12-06 ENCOUNTER — TELEPHONE (OUTPATIENT)
Dept: CARDIOLOGY | Facility: MEDICAL CENTER | Age: 62
End: 2022-12-06
Payer: COMMERCIAL

## 2022-12-06 DIAGNOSIS — I42.8 OTHER CARDIOMYOPATHY (HCC): Chronic | ICD-10-CM

## 2022-12-16 ENCOUNTER — PATIENT MESSAGE (OUTPATIENT)
Dept: CARDIOLOGY | Facility: PHYSICIAN GROUP | Age: 62
End: 2022-12-16
Payer: COMMERCIAL

## 2022-12-19 ENCOUNTER — TELEPHONE (OUTPATIENT)
Dept: CARDIOLOGY | Facility: MEDICAL CENTER | Age: 62
End: 2022-12-19
Payer: COMMERCIAL

## 2022-12-19 NOTE — TELEPHONE ENCOUNTER
Review of her chart recommends that she may benefit from statin therapy to reduce the bad cholesterol.    This is of course in addition to heart healthy lifestyle, diet and exercise.  Work on at least 1.5 hours a week of moderate exercise (typical brisk walking or similar activity)  LOW SALT DIET  KEEP YOUR SODIUM EQUAL TO CALORIES AND NO MORE THAN DOUBLE THE CALORIES FOR A LOW SALT DIET  Cardiosmart.org - great resource for American College of Cardiology on heart disease prevention and treatment    Thank you    Lab Results   Component Value Date/Time    CHOLSTRLTOT 104 04/25/2019 07:54 AM    CHOLSTRLTOT 106 10/17/2016 10:04 AM    LDL 46 04/25/2019 07:54 AM    LDL 49 10/17/2016 10:04 AM    HDL 38 (L) 04/25/2019 07:54 AM    HDL 33 (A) 10/17/2016 10:04 AM    TRIGLYCERIDE 99 04/25/2019 07:54 AM    TRIGLYCERIDE 121 10/17/2016 10:04 AM       Lab Results   Component Value Date/Time    SODIUM 137 09/26/2022 06:48 AM    POTASSIUM 4.0 09/26/2022 06:48 AM    CHLORIDE 103 09/26/2022 06:48 AM    CO2 21 09/26/2022 06:48 AM    GLUCOSE 113 (H) 09/26/2022 06:48 AM    BUN 14 09/26/2022 06:48 AM    CREATININE 0.64 09/26/2022 06:48 AM    BUNCREATRAT 20 04/25/2019 07:54 AM     Lab Results   Component Value Date/Time    ALKPHOSPHAT 99 09/26/2022 06:48 AM    ASTSGOT 28 09/26/2022 06:48 AM    ALTSGPT 26 09/26/2022 06:48 AM    TBILIRUBIN 0.4 09/26/2022 06:48 AM

## 2023-02-06 ENCOUNTER — TELEMEDICINE (OUTPATIENT)
Dept: SLEEP MEDICINE | Facility: MEDICAL CENTER | Age: 63
End: 2023-02-06
Payer: COMMERCIAL

## 2023-02-06 VITALS — HEIGHT: 69 IN | WEIGHT: 230 LBS | BODY MASS INDEX: 34.07 KG/M2

## 2023-02-06 DIAGNOSIS — E11.9 TYPE 2 DIABETES MELLITUS WITHOUT COMPLICATION, WITHOUT LONG-TERM CURRENT USE OF INSULIN (HCC): ICD-10-CM

## 2023-02-06 DIAGNOSIS — I48.0 PAF (PAROXYSMAL ATRIAL FIBRILLATION) (HCC): ICD-10-CM

## 2023-02-06 DIAGNOSIS — G47.33 OSA (OBSTRUCTIVE SLEEP APNEA): Primary | ICD-10-CM

## 2023-02-06 PROCEDURE — 99213 OFFICE O/P EST LOW 20 MIN: CPT | Mod: 95 | Performed by: STUDENT IN AN ORGANIZED HEALTH CARE EDUCATION/TRAINING PROGRAM

## 2023-02-06 ASSESSMENT — FIBROSIS 4 INDEX: FIB4 SCORE: 1.76

## 2023-02-06 NOTE — PROGRESS NOTES
[facility] Sleep Center Virtual Follow Up Note     Date: 2023 / Time: 1:11 PM    CC: Telemedicine sleep follow-up    This sleep consultation is provided using Telemedicine and secure encrypted software. Consent was obtained.    Consulting MD: Delroy Knott M.D.  Requesting Physician: GURPREET Manning  Patient name:      Gisel Montez  : 1960  MRN: [id number]     This visit was conducted via Zoom using secure and encrypted videoconferencing technology.   The patient was in a private location at home in the [facility].    The patient's identity was confirmed and verbal consent was obtained for this virtual visit.      HISTORY OF PRESENT ILLNESS:     Gisel Montez is a 62 y.o. female with CAD,Hypothyroidism, diabetes mellitus type 2, atrial fibrillation, obesity and moderate obstructive sleep apnea with nocturnal hypoxia.  Presents to clinic to follow-up regarding sleep study results.    She reports in a sleep study was not the best night sleep.  She did do a 2 night home sleep study through [facility].  She states that the finger sensor kept beeping at her which would wake her up at times.    She has a known history of atrial fibrillation and has undergone ablations in the past.  She continues to report that she has no trouble falling asleep or staying asleep.  She continues to find that she has low energy at times during the day.  She continues to snore and her sleep.    Sleep history  2022 and 2020 a 2 night home sleep study was performed through [facility].  Study showed moderate obstructive sleep apnea with an overall AHI of 15.8.  There was mild nocturnal hypoxia with time spent below 89% oxygen level of 29 minutes average between both nights.    MEDICAL HISTORY  Past Medical History:   Diagnosis Date    ACTIVE SMOKER      4-10 sticks/40    Acute bronchitis due to infection 10/03/2016    Acute MI, inferior wall (HCC)      Atrial fibrillation (HCC)     Bicuspid  aortic valve - on TOÑO 2022     CAD (coronary artery disease)      Cardiomyopathy (HCC)     Chickenpox     Chronic anticoagulation     Class 2 obesity due to excess calories without serious comorbidity in adult 09/10/2018    Hyperglycemia 10/31/2016    Hypothyroidism      Mumps     Obesity     PAF (paroxysmal atrial fibrillation) (HCC)      Palpitations      Tonsillitis     Type 2 diabetes mellitus without complication (HCC) 2017    Vitamin D deficiency 2017    Wears glasses         SURGICAL HISTORY  Past Surgical History:   Procedure Laterality Date    GYN SURGERY      tubal ligation    OTHER      choleycystectomy    OTHER CARDIAC SURGERY      cardioversion    SEPTAL RECONSTRUCTION          FAMILY HISTORY  Family History   Problem Relation Age of Onset    Diabetes Father 68       SOCIAL HISTORY  Social History     Socioeconomic History    Marital status:    Occupational History    Occupation: other     Comment: [employer]   Tobacco Use    Smoking status: Former     Packs/day: 0.50     Years: 35.00     Pack years: 17.50     Types: Cigarettes     Start date: 1974     Quit date: 2015     Years since quittin.6    Smokeless tobacco: Never    Tobacco comments:     occ vape   Vaping Use    Vaping Use: Some days    Substances: Nicotine, 1.8 occasionally   Substance and Sexual Activity    Alcohol use: Yes     Alcohol/week: 3.6 oz     Types: 2 Glasses of wine, 4 Cans of beer per week     Comment: on weekends    Drug use: No    Sexual activity: Yes     Partners: Male   Social History Narrative    . Lives in [place], [place]. Works for [employer].        CURRENT MEDICATIONS  Current Outpatient Medications   Medication Sig Dispense Refill    rivaroxaban (XARELTO) 20 MG Tab tablet Take 1 Tablet by mouth with dinner. 90 Tablet 3    metoprolol SR (TOPROL XL) 100 MG TABLET SR 24 HR Take 1 Tablet by mouth every day. 90 Tablet 3    metFORMIN (GLUCOPHAGE) 500 MG Tab Take 500 mg by mouth 2 times a  "day with meals.      Multiple Vitamins-Minerals (HAIR SKIN NAILS PO) Take 1 Tablet by mouth every day.      amiodarone (CORDARONE) 200 MG Tab Take 1 Tablet by mouth every day. 90 Tablet 3    levothyroxine (SYNTHROID) 112 MCG Tab Take 1 Tab by mouth every morning before breakfast. 30 Tab 2    vitamin D (CHOLECALCIFEROL) 1000 UNIT TABS Take 1,000 Units by mouth every day.      CINNAMON PO Take 2 Capsule by mouth every day.       No current facility-administered medications for this visit.       REVIEW OF SYSTEMS  Constitutional: Denies fevers, Denies weight changes  Ears/Nose/Throat/Mouth: Denies nasal congestion or sore throat   Cardiovascular: Denies chest pain or palpitations   Respiratory: Denies shortness of breath, Denies cough  Gastrointestinal/Hepatic: Denies abdominal pain, nausea, vomiting, diarrhea  Musculoskeletal/Rheum: Denies  joint pain and swelling   Sleep: See HPI      Physical Examination:  Vitals/ General Appearance:   Weight/BMI: Body mass index is 33.97 kg/m².  Ht 1.753 m (5' 9\")   Wt 104 kg (230 lb)   Vitals:    02/06/23 1305   Weight: 104 kg (230 lb)   Height: 1.753 m (5' 9\")       General: alert and oriented to time, place and person. Cooperative and in no apparent distress.   Constitutional: Alert, no distress, well-groomed.  Voice: normal volume and donta  Head: normocephalic   Pulmonary: Voice normal volume and donta. No sounds or signs of increased work of breathing.   Neurologic: No involuntary movements     Assessment and Plan  Gisel Montez is a 62 y.o. female with CAD,Hypothyroidism, diabetes mellitus type 2, atrial fibrillation, obesity and moderate obstructive sleep apnea with nocturnal hypoxia.  Presents to clinic to follow-up regarding sleep study results.    Obstructive sleep apnea   Reviewed recent home sleep study with patient showing an AHI of 15.8, and time below 89% saturation of 29 minutes.   Based on sleep study and symptoms meets criteria for moderate obstructive " sleep apnea.   We discussed the pathophysiology of obstructive sleep apnea (ROHIT) and risk factors for the disease. We also discussed possible consequences of untreated ROHIT, including excessive daytime sleepiness and fatigue, cognitive dysfunction, cardiovascular complications such as elevated blood pressure, heart attacks, cardiac arrhythmias, and strokes. We discussed how ROHIT typically gets worse with age. We discussed treatment options for ROHIT, including the gold standard therapy (PAP), alternative options such as a mandibular advancement device (custom-made oral appliances) and surgeries.     We will proceed CPAP therapy.     We will plan to send supply order to [facility] in [place].    RECOMMENDATIONS  -Start Auto CPAP at pressures 4-8 cm H2O  -Discussed importance of adherence/compliance   -Prescription generated for supplies   -Patient counseled to avoid driving when sleepy. Encouraged to anticipate sleepiness, consider taking a 10 min nap prior to driving, alternate with another , or pull over if sleepy while driving  -Advised to contact our office or myself with any questions via MyChart    Positive airway pressure will favorably impact many of the adverse conditions and effects provoked by ROHIT.    Have advised the patient to follow up with the appropriate healthcare practitioners for all other medical problems and issues.    Return for 1-2 months after starting PAP therapy.      Please note portions of this record was created using voice recognition software. I have made every reasonable attempt to correct obvious errors, but I expect that there are errors of grammar and possibly content I did not discover before finalizing the note.

## 2023-04-27 ENCOUNTER — OFFICE VISIT (OUTPATIENT)
Dept: CARDIOLOGY | Facility: PHYSICIAN GROUP | Age: 63
End: 2023-04-27
Payer: COMMERCIAL

## 2023-04-27 VITALS
OXYGEN SATURATION: 96 % | BODY MASS INDEX: 36.73 KG/M2 | SYSTOLIC BLOOD PRESSURE: 118 MMHG | RESPIRATION RATE: 12 BRPM | DIASTOLIC BLOOD PRESSURE: 72 MMHG | HEIGHT: 69 IN | HEART RATE: 55 BPM | WEIGHT: 248 LBS

## 2023-04-27 DIAGNOSIS — Q23.1 BICUSPID AORTIC VALVE: Chronic | ICD-10-CM

## 2023-04-27 DIAGNOSIS — Z51.81 ENCOUNTER FOR MONITORING AMIODARONE THERAPY: ICD-10-CM

## 2023-04-27 DIAGNOSIS — I48.0 PAF (PAROXYSMAL ATRIAL FIBRILLATION) (HCC): ICD-10-CM

## 2023-04-27 DIAGNOSIS — I25.2 HISTORY OF NON-ST ELEVATION MYOCARDIAL INFARCTION (NSTEMI): ICD-10-CM

## 2023-04-27 DIAGNOSIS — I42.8 OTHER CARDIOMYOPATHY (HCC): Chronic | ICD-10-CM

## 2023-04-27 DIAGNOSIS — I48.19 PERSISTENT ATRIAL FIBRILLATION (HCC): ICD-10-CM

## 2023-04-27 DIAGNOSIS — Z98.890 H/O CARDIAC RADIOFREQUENCY ABLATION: ICD-10-CM

## 2023-04-27 DIAGNOSIS — Z79.899 ENCOUNTER FOR MONITORING AMIODARONE THERAPY: ICD-10-CM

## 2023-04-27 PROCEDURE — 99214 OFFICE O/P EST MOD 30 MIN: CPT | Performed by: INTERNAL MEDICINE

## 2023-04-27 RX ORDER — ROSUVASTATIN CALCIUM 5 MG/1
TABLET, COATED ORAL
COMMUNITY
Start: 2023-04-12 | End: 2023-04-27

## 2023-04-27 RX ORDER — ROSUVASTATIN CALCIUM 5 MG/1
TABLET, COATED ORAL
COMMUNITY
End: 2023-04-27

## 2023-04-27 ASSESSMENT — ENCOUNTER SYMPTOMS
BRUISES/BLEEDS EASILY: 0
SORE THROAT: 0
PALPITATIONS: 0
DIZZINESS: 0
FEVER: 0
CLAUDICATION: 0
COUGH: 0
FOCAL WEAKNESS: 0
BLURRED VISION: 0
SHORTNESS OF BREATH: 0
CHILLS: 0
ABDOMINAL PAIN: 0
PND: 0
FALLS: 0
WEAKNESS: 0
NAUSEA: 0

## 2023-04-27 ASSESSMENT — FIBROSIS 4 INDEX: FIB4 SCORE: 1.76

## 2023-04-27 NOTE — PATIENT INSTRUCTIONS
We discussed that you should talk with primary care (or endocrinology) about oral medication: empagliflozin (JARDIANCE®  Preferred for CAD), dapagliflozin (FARXIGA®, preferred for CKD),  there are cardiovascular benefits but there are also risks.  You may also want to consider liraglutide (Victoza®), semaglutide (Ozempic®), dulaglutide (Trulicity®) which are injection with cardiovascular benefits but also risks.      I am a [facility] Cardiologist providing cardiology care with [facility] [place]:    [facility] for those over 65 ([address] [facility] [facility] [place]   [facility] ([address])     For clinic appointment scheduling, please contact [facility] at [phone].    For all clinical questions related to your heart care including medications, always contact me at [facility] Cardiology Office in [place] by sending a MyChart or calling [phone].    We have Pacer check in person available in [facility] for those with [employer] or [employer] Pacemakers. Depending on insurance, you can also do in-person at [facility] ([address], [phone]) on Wednesdays with YAAKOV Varner.     Most testing (Labs, Echo, Nuclear Stress Test, Vascular Screening) can be done at [facility] please call [phone] to schedule.    For Cardiac PET, Stress Echo, Cardiac MRI, Cardiac CTA these are only done in [place] please call [facility] Imaging [phone] to schedule.    If you need a procedure such as angiogram, heart stent, pacemaker or ablation my office will contact you for scheduling.      If you need to see me more than once a year, or see us urgently in person, ANIRUDH Sullivan can see you as well.  There are two other Cardiologists who work with me here at [facility] (Dr Catracho Estrada and Dr Nolberto Shipman)    [employer] is the easiest way to communicate with my office on your smartphone or computer via the internet.    [facility] Customer Support for [employer], call  [phone].

## 2023-04-27 NOTE — PROGRESS NOTES
Chief Complaint   Patient presents with    Atrial Fibrillation     FV Dx: Persistent atrial fibrillation       Subjective     Gisel Montez is a 62 y.o. female who presents today for follow-up of her history of cardiomyopathy with A-fib with the ablation last year she is doing well she was started on rosuvastatin by primary care given diabetes she was worried it was contributing to weight gain        Past Medical History:   Diagnosis Date    ACTIVE SMOKER      4-10 sticks/40    Acute bronchitis due to infection 10/03/2016    Acute MI, inferior wall (HCC)      Atrial fibrillation (HCC)     Bicuspid aortic valve - on TOÑO 2022     CAD (coronary artery disease)      Cardiomyopathy (HCC)     Chickenpox     Chronic anticoagulation     Class 2 obesity due to excess calories without serious comorbidity in adult 09/10/2018    Hyperglycemia 10/31/2016    Hypothyroidism      Mumps     Obesity     PAF (paroxysmal atrial fibrillation) (HCC)      Palpitations      Tonsillitis     Type 2 diabetes mellitus without complication (HCC) 2017    Vitamin D deficiency 2017    Wears glasses      Past Surgical History:   Procedure Laterality Date    GYN SURGERY      tubal ligation    OTHER      choleycystectomy    OTHER CARDIAC SURGERY      cardioversion    SEPTAL RECONSTRUCTION       Family History   Problem Relation Age of Onset    Diabetes Father 68     Social History     Socioeconomic History    Marital status:      Spouse name: Not on file    Number of children: Not on file    Years of education: Not on file    Highest education level: Not on file   Occupational History    Occupation: other     Comment: [employer]   Tobacco Use    Smoking status: Former     Packs/day: 0.50     Years: 35.00     Pack years: 17.50     Types: Cigarettes     Start date: 1974     Quit date: 2015     Years since quittin.9    Smokeless tobacco: Never    Tobacco comments:     occ vape   Vaping Use    Vaping Use: Some days     "Substances: Nicotine, 1.8 occasionally   Substance and Sexual Activity    Alcohol use: Yes     Alcohol/week: 3.6 oz     Types: 2 Glasses of wine, 4 Cans of beer per week     Comment: on weekends    Drug use: No    Sexual activity: Yes     Partners: Male   Other Topics Concern    Not on file   Social History Narrative    . Lives in [place], [place]. Works for [employer].     Social Determinants of Health     Financial Resource Strain: Not on file   Food Insecurity: Not on file   Transportation Needs: Not on file   Physical Activity: Not on file   Stress: Not on file   Social Connections: Not on file   Intimate Partner Violence: Not on file   Housing Stability: Not on file     Allergies   Allergen Reactions    Other Food      \"CELERY\". Became violently ill, tongue swelled and hard to breath.    Atorvastatin      LDL naturally normal continue to reassess    Doxycycline      Myocarditis     Outpatient Encounter Medications as of 4/27/2023   Medication Sig Dispense Refill    rosuvastatin (CRESTOR) 5 MG Tab       Coenzyme Q10 (CO Q 10 PO) Take  by mouth.      Chlorpheniramine Maleate (ALLERGY PO) Take  by mouth. PRN seasonal allergies      rivaroxaban (XARELTO) 20 MG Tab tablet Take 1 Tablet by mouth with dinner. 90 Tablet 3    metoprolol SR (TOPROL XL) 100 MG TABLET SR 24 HR Take 1 Tablet by mouth every day. 90 Tablet 3    metFORMIN (GLUCOPHAGE) 500 MG Tab Take 500 mg by mouth 2 times a day with meals.      Multiple Vitamins-Minerals (HAIR SKIN NAILS PO) Take 1 Tablet by mouth every day.      amiodarone (CORDARONE) 200 MG Tab Take 1 Tablet by mouth every day. 90 Tablet 3    levothyroxine (SYNTHROID) 112 MCG Tab Take 1 Tab by mouth every morning before breakfast. 30 Tab 2    vitamin D (CHOLECALCIFEROL) 1000 UNIT TABS Take 1,000 Units by mouth every day.      [DISCONTINUED] rosuvastatin (CRESTOR) 5 MG Tab  (Patient not taking: Reported on 4/27/2023)       No facility-administered encounter medications on file as of " "4/27/2023.     Review of Systems   Constitutional:  Negative for chills and fever.   HENT:  Negative for sore throat.    Eyes:  Negative for blurred vision.   Respiratory:  Negative for cough and shortness of breath.    Cardiovascular:  Negative for chest pain, palpitations, claudication, leg swelling and PND.   Gastrointestinal:  Negative for abdominal pain and nausea.   Musculoskeletal:  Negative for falls and joint pain.   Skin:  Negative for rash.   Neurological:  Negative for dizziness, focal weakness and weakness.   Endo/Heme/Allergies:  Does not bruise/bleed easily.            Objective     /72 (BP Location: Left arm, Patient Position: Sitting, BP Cuff Size: Adult)   Pulse (!) 55   Resp 12   Ht 1.753 m (5' 9\")   Wt 112 kg (248 lb)   LMP 10/07/2014   SpO2 96%   BMI 36.62 kg/m²     Physical Exam  Constitutional:       General: She is not in acute distress.     Appearance: She is not diaphoretic.   Eyes:      General: No scleral icterus.  Neck:      Vascular: No JVD.   Cardiovascular:      Rate and Rhythm: Normal rate.      Heart sounds: Normal heart sounds. No murmur heard.    No friction rub. No gallop.   Pulmonary:      Effort: No respiratory distress.      Breath sounds: No wheezing or rales.   Abdominal:      General: Bowel sounds are normal.      Palpations: Abdomen is soft.   Musculoskeletal:      Right lower leg: No edema.      Left lower leg: No edema.   Skin:     Findings: No rash.   Neurological:      Mental Status: She is alert. Mental status is at baseline.   Psychiatric:         Mood and Affect: Mood normal.          We reviewed in person the most recent labs  Recent Results (from the past 5040 hour(s))   EC-ECHOCARDIOGRAM COMPLETE W/O CONT    Collection Time: 12/02/22 12:27 PM   Result Value Ref Range    Eject.Frac. MOD BP 26.01     Eject.Frac. MOD 4C 36.44     Eject.Frac. MOD 2C 24.19     Left Ventrical Ejection Fraction 35          Assessment & Plan     1. Other cardiomyopathy (HCC) "        2. Bicuspid aortic valve - on TOÑO 9/2022  EC-ECHOCARDIOGRAM COMPLETE W/O CONT      3. Encounter for monitoring amiodarone therapy  EC-ECHOCARDIOGRAM COMPLETE W/O CONT      4. H/O cardiac radiofrequency ablation 9/26/22 Dr Montejo   EC-ECHOCARDIOGRAM COMPLETE W/O CONT      5. History of non-ST elevation myocardial infarction (NSTEMI)        6. PAF (paroxysmal atrial fibrillation) (HCC)        7. Persistent atrial fibrillation (HCC)  EC-ECHOCARDIOGRAM COMPLETE W/O CONT          Medical Decision Making: Today's Assessment/Status/Plan:      It was my pleasure to meet with Ms. Montez.    We addressed the management of hypertension at today's visit. Blood pressure is well controlled.  We specifically assessed the labs on hypertension treatment    We addressed the management of dyslipidemia and atherosclerosis at today's visit. Thinks had weight gain with rosuvastatin, in the past her LDL was less than 50 so she would not be recommended statin    We addressed the management of atrial fibrillation.  She is on proper anticoagulation cholesterol management and rate or rhythm control as appropriate.  We addressed the potential side effects and laboratory follow-up for these medications.    We addressed the management of chronic anticoagulation at today's visit. She understands the risks and benefits of chronic anticoagulation.  We reviewed and verified the results of annual testing for anemia and kidney function.      We addressed the management of congestive heart failure with reduced ejection fraction .  She is on proper mineralacorticoid, angiotensin/ace inhibition with neprilysin inhibition, SGLTs inhibitor and beta-blockers as appropriate.  We addressed the potential side effects and regular laboratory follow-up for these medications.    ECHO IN [place] hopefully it is now above 40%    We addressed the management of antiarrhythmic medication at today's visit. She understands the risks and benefits of amiodarone  therapy. We have specifically made sure that her anticoagulation is appropriate, QTc is appropriate and screened for arrhythmias.   I also stressed the importance of regular labs for thyroid and liver, regular lung exam and regular eye exam. She understands that this is a high risk medication and requires frequent monitoring as well as informing providers for any new medication to make sure it is appropriate for QTC prolongation.  Depending on the echo may be able to stop amiodarone    I will see Ms. Montez back in 1 year time and encouraged her to follow up with us over the phone or electronically using my [employer] as issues arise.    It is my pleasure to participate in the care of Ms. Montez.  Please do not hesitate to contact me with questions or concerns.    Young Issa MD PhD [facility]  Cardiologist [facility]    Please note that this dictation was created using voice recognition software. There may be errors I did not discover before finalizing the note.

## 2023-05-15 ENCOUNTER — TELEPHONE (OUTPATIENT)
Dept: CARDIOLOGY | Facility: MEDICAL CENTER | Age: 63
End: 2023-05-15
Payer: COMMERCIAL

## 2023-05-15 NOTE — TELEPHONE ENCOUNTER
CW    Caller: Courtney Radiology Tech at [facility]  Phone: [phone]  Fax: [phone]    Topic/issue: Courtney is calling in regards to the patients ECHO order from our office and is inquiring if this has been pre-authorized by the patients insurance. Please advise.     Callback Number: [phone] (home)     Thank you,   Shae SORIANO

## 2023-06-15 ENCOUNTER — PATIENT MESSAGE (OUTPATIENT)
Dept: CARDIOLOGY | Facility: PHYSICIAN GROUP | Age: 63
End: 2023-06-15
Payer: COMMERCIAL

## 2023-06-19 NOTE — TELEPHONE ENCOUNTER
To: CW    Patient asking if there are any further recommendations from Echo from 5/24/23. Echo results located in Media. Please advise. Thank you

## 2023-08-03 DIAGNOSIS — I25.10 CORONARY ARTERY DISEASE, NON-OCCLUSIVE: ICD-10-CM

## 2023-10-22 DIAGNOSIS — Z79.01 MONITORING FOR ANTICOAGULANT USE: ICD-10-CM

## 2023-10-22 DIAGNOSIS — Z51.81 MONITORING FOR ANTICOAGULANT USE: ICD-10-CM

## 2023-10-22 DIAGNOSIS — I48.0 PAF (PAROXYSMAL ATRIAL FIBRILLATION) (HCC): ICD-10-CM

## 2023-10-24 NOTE — TELEPHONE ENCOUNTER
Lab ordered, lab slip printed, and mailed to pt mailing address.    90 day courtesy refill sent.

## 2023-11-03 DIAGNOSIS — Z79.01 MONITORING FOR ANTICOAGULANT USE: ICD-10-CM

## 2023-11-03 DIAGNOSIS — Z51.81 MONITORING FOR ANTICOAGULANT USE: ICD-10-CM

## 2023-11-06 DIAGNOSIS — I48.0 PAF (PAROXYSMAL ATRIAL FIBRILLATION) (HCC): ICD-10-CM

## 2023-11-07 RX ORDER — METOPROLOL SUCCINATE 100 MG/1
100 TABLET, EXTENDED RELEASE ORAL DAILY
Qty: 90 TABLET | Refills: 3 | OUTPATIENT
Start: 2023-11-07

## 2024-02-01 DIAGNOSIS — I48.0 PAF (PAROXYSMAL ATRIAL FIBRILLATION) (HCC): ICD-10-CM

## 2024-02-02 NOTE — TELEPHONE ENCOUNTER
BMP still needed, noted CBC completed 11/3/23.  FV scheduled 5/2/24.  90 day courtesy refill sent.

## 2024-03-20 ENCOUNTER — PATIENT MESSAGE (OUTPATIENT)
Dept: CARDIOLOGY | Facility: MEDICAL CENTER | Age: 64
End: 2024-03-20
Payer: COMMERCIAL

## 2024-04-18 ENCOUNTER — TELEPHONE (OUTPATIENT)
Dept: CARDIOLOGY | Facility: MEDICAL CENTER | Age: 64
End: 2024-04-18
Payer: COMMERCIAL

## 2024-04-18 DIAGNOSIS — Z51.81 ENCOUNTER FOR MONITORING AMIODARONE THERAPY: ICD-10-CM

## 2024-04-18 DIAGNOSIS — I42.8 OTHER CARDIOMYOPATHY (HCC): Chronic | ICD-10-CM

## 2024-04-18 DIAGNOSIS — I48.0 PAF (PAROXYSMAL ATRIAL FIBRILLATION) (HCC): ICD-10-CM

## 2024-04-18 DIAGNOSIS — Z79.899 ENCOUNTER FOR MONITORING AMIODARONE THERAPY: ICD-10-CM

## 2024-04-18 NOTE — TELEPHONE ENCOUNTER
CW    Caller: Ruth from [facility] pharmacy in [place]    Topic/issue: Ruth called because the patient is trying to refill the medication amiodarone (CORDARONE) 200 MG Tab but insurance is requiring a 90 day supply. She was hoping to have this sent to the pharmacy.     Please advise.     Callback Number: [phone] - [facility]     Thank you,     Gilma CALIX

## 2024-04-19 RX ORDER — AMIODARONE HYDROCHLORIDE 200 MG/1
200 TABLET ORAL DAILY
Qty: 90 TABLET | Refills: 3 | Status: SHIPPED | OUTPATIENT
Start: 2024-04-19

## 2024-04-19 NOTE — TELEPHONE ENCOUNTER
To CW, please see AST and ALT levels below.  Ok to refill amiodarone?    ======================    Reviewed latest lab and AST 68, ALT 59 see media tab 11/15/2023.   Labs, CXR, and EKG ordered per refill protocol.

## 2024-04-19 NOTE — TELEPHONE ENCOUNTER
Ordered rx for amiodqarone, let her know it is important to do yearly labs and call us for liver pains sooner to get urgent labs, the risk is very small.    Otherwise she is supposed to update her echo in [place] or [place] on May 2nd if she can do prior to seeing me at 11:30 same day would work

## 2024-04-20 DIAGNOSIS — I48.0 PAF (PAROXYSMAL ATRIAL FIBRILLATION) (HCC): ICD-10-CM

## 2024-04-22 RX ORDER — METOPROLOL SUCCINATE 100 MG/1
100 TABLET, EXTENDED RELEASE ORAL
Qty: 90 TABLET | Refills: 3 | Status: SHIPPED | OUTPATIENT
Start: 2024-04-22

## 2024-04-22 NOTE — TELEPHONE ENCOUNTER
Upon chart review, pt is active on [facility].  Last login 4/22/2024.  [employer] message sent CW recommendations, awaiting pt response.

## 2024-04-22 NOTE — TELEPHONE ENCOUNTER
Is the patient due for a refill? Yes    Was the patient seen the past year? Yes    Date of last office visit: 04/27/23    Does the patient have an upcoming appointment?  Yes   If yes, When? 05/02/24    Provider to refill:CW    Does the patients insurance require a 100 day supply?  No

## 2024-04-23 ENCOUNTER — PATIENT MESSAGE (OUTPATIENT)
Dept: CARDIOLOGY | Facility: MEDICAL CENTER | Age: 64
End: 2024-04-23
Payer: COMMERCIAL

## 2024-04-24 ENCOUNTER — PATIENT MESSAGE (OUTPATIENT)
Dept: CARDIOLOGY | Facility: MEDICAL CENTER | Age: 64
End: 2024-04-24
Payer: COMMERCIAL

## 2024-04-25 NOTE — PATIENT COMMUNICATION
To CW , pt is unable to have imaging completed prior to your FV 5/2/23. please reply directly to pt regarding advise if she should keep FV or reschedule visit for after testing, thank you!

## 2024-04-26 NOTE — PROGRESS NOTES
What ever she prefers, if she does not have the echocardiogram done before we can always follow-up over the phone afterwards we just like to see her yearly, the conversation is more informed if she has the echo first so she might want to push her appointment back till the fall make sure she has adequate prescriptions

## 2024-04-26 NOTE — PATIENT COMMUNICATION
Phone number called: [phone] (home)      Call outcome: pt decided to move appointment on 05/31/24 at 2pm. Order for echo sent to [facility]. Please see media.

## 2024-05-15 ENCOUNTER — TELEPHONE (OUTPATIENT)
Dept: CARDIOLOGY | Facility: MEDICAL CENTER | Age: 64
End: 2024-05-15
Payer: COMMERCIAL

## 2024-05-21 DIAGNOSIS — I42.8 OTHER CARDIOMYOPATHY (HCC): Chronic | ICD-10-CM

## 2024-05-21 DIAGNOSIS — I48.0 PAF (PAROXYSMAL ATRIAL FIBRILLATION) (HCC): ICD-10-CM

## 2024-05-31 ENCOUNTER — OFFICE VISIT (OUTPATIENT)
Dept: CARDIOLOGY | Facility: MEDICAL CENTER | Age: 64
End: 2024-05-31
Attending: INTERNAL MEDICINE
Payer: COMMERCIAL

## 2024-05-31 VITALS
OXYGEN SATURATION: 93 % | SYSTOLIC BLOOD PRESSURE: 102 MMHG | DIASTOLIC BLOOD PRESSURE: 62 MMHG | WEIGHT: 250 LBS | BODY MASS INDEX: 37.03 KG/M2 | HEART RATE: 54 BPM | HEIGHT: 69 IN | RESPIRATION RATE: 18 BRPM

## 2024-05-31 DIAGNOSIS — Q23.1 BICUSPID AORTIC VALVE: Chronic | ICD-10-CM

## 2024-05-31 DIAGNOSIS — I48.19 PERSISTENT ATRIAL FIBRILLATION (HCC): ICD-10-CM

## 2024-05-31 DIAGNOSIS — Z51.81 ENCOUNTER FOR MONITORING AMIODARONE THERAPY: ICD-10-CM

## 2024-05-31 DIAGNOSIS — I42.8 OTHER CARDIOMYOPATHY (HCC): Chronic | ICD-10-CM

## 2024-05-31 DIAGNOSIS — D68.69 HYPERCOAGULABLE STATE DUE TO PERSISTENT ATRIAL FIBRILLATION (HCC): ICD-10-CM

## 2024-05-31 DIAGNOSIS — I25.10 CORONARY ARTERY DISEASE, NON-OCCLUSIVE: ICD-10-CM

## 2024-05-31 DIAGNOSIS — I48.19 HYPERCOAGULABLE STATE DUE TO PERSISTENT ATRIAL FIBRILLATION (HCC): ICD-10-CM

## 2024-05-31 DIAGNOSIS — Z79.899 ENCOUNTER FOR MONITORING AMIODARONE THERAPY: ICD-10-CM

## 2024-05-31 DIAGNOSIS — E78.5 DYSLIPIDEMIA: ICD-10-CM

## 2024-05-31 DIAGNOSIS — I47.10 SVT (SUPRAVENTRICULAR TACHYCARDIA) (HCC): ICD-10-CM

## 2024-05-31 DIAGNOSIS — I48.0 PAF (PAROXYSMAL ATRIAL FIBRILLATION) (HCC): ICD-10-CM

## 2024-05-31 DIAGNOSIS — Z98.890 H/O CARDIAC RADIOFREQUENCY ABLATION: ICD-10-CM

## 2024-05-31 RX ORDER — METOPROLOL SUCCINATE 100 MG/1
100 TABLET, EXTENDED RELEASE ORAL
Qty: 90 TABLET | Refills: 3 | Status: SHIPPED | OUTPATIENT
Start: 2024-05-31

## 2024-05-31 ASSESSMENT — FIBROSIS 4 INDEX: FIB4 SCORE: 1.79

## 2024-05-31 NOTE — PATIENT INSTRUCTIONS
We addressed the management of antiarrhythmic medication at today's visit. She understands the risks and benefits of amiodarone therapy. We have specifically made sure that her anticoagulation is appropriate, QTc is appropriate and screened for arrhythmias.   I also stressed the importance of regular labs for thyroid and liver, regular lung exam and regular eye exam. She understands that this is a high risk medication and requires frequent monitoring as well as informing providers for any new medication to make sure it is appropriate for QTC prolongation.

## 2024-05-31 NOTE — PROGRESS NOTES
Chief Complaint   Patient presents with    Atrial Fibrillation     F/v dx: Persistent atrial fibrillation (HCC)    Coronary Artery Disease    Supraventricular Tachycardia (SVT)       Subjective     Gisel Montez is a 63 y.o. female who presents today with her history of cardiomyopathy with A-fib with the ablation     Echo follow was back to normal     Past Medical History:   Diagnosis Date    ACTIVE SMOKER      4-10 sticks/40    Acute bronchitis due to infection 10/03/2016    Acute MI, inferior wall (HCC)      Atrial fibrillation (HCC)     Bicuspid aortic valve - on TOÑO 2022     CAD (coronary artery disease)      Cardiomyopathy (HCC)     Chickenpox     Chronic anticoagulation     Class 2 obesity due to excess calories without serious comorbidity in adult 09/10/2018    Hyperglycemia 10/31/2016    Hypothyroidism      Mumps     Obesity     PAF (paroxysmal atrial fibrillation) (HCC)      Palpitations      Tonsillitis     Type 2 diabetes mellitus without complication (HCC) 2017    Vitamin D deficiency 2017    Wears glasses      Past Surgical History:   Procedure Laterality Date    GYN SURGERY      tubal ligation    OTHER      choleycystectomy    OTHER CARDIAC SURGERY      cardioversion    SEPTAL RECONSTRUCTION       Family History   Problem Relation Age of Onset    Diabetes Father 68     Social History     Socioeconomic History    Marital status:      Spouse name: Not on file    Number of children: Not on file    Years of education: Not on file    Highest education level: Not on file   Occupational History    Occupation: other     Comment: [employer]   Tobacco Use    Smoking status: Former     Current packs/day: 0.00     Average packs/day: 0.5 packs/day for 41.4 years (20.7 ttl pk-yrs)     Types: Cigarettes     Start date: 1974     Quit date: 2015     Years since quittin.0    Smokeless tobacco: Never    Tobacco comments:     occ vape   Vaping Use    Vaping status: Some Days     "Substances: Nicotine, 1.8 occasionally   Substance and Sexual Activity    Alcohol use: Yes     Alcohol/week: 3.6 oz     Types: 2 Glasses of wine, 4 Cans of beer per week     Comment: on weekends    Drug use: No    Sexual activity: Yes     Partners: Male   Other Topics Concern    Not on file   Social History Narrative    . Lives in [place], [place]. Works for [employer].     Social Determinants of Health     Financial Resource Strain: Not on file   Food Insecurity: Not on file   Transportation Needs: Not on file   Physical Activity: Not on file   Stress: Not on file   Social Connections: Not on file   Intimate Partner Violence: Not on file   Housing Stability: Not on file     Allergies   Allergen Reactions    Other Food      \"CELERY\". Became violently ill, tongue swelled and hard to breath.    Atorvastatin      LDL naturally normal continue to reassess    Doxycycline      Myocarditis     Outpatient Encounter Medications as of 5/31/2024   Medication Sig Dispense Refill    rivaroxaban (XARELTO) 20 MG Tab tablet Take 1 Tablet by mouth with dinner. 90 Tablet 3    metoprolol SR (TOPROL XL) 100 MG TABLET SR 24 HR Take 1 Tablet by mouth every day. 90 Tablet 3    amiodarone (CORDARONE) 200 MG Tab Take 1 Tablet by mouth every day. 90 Tablet 3    Chlorpheniramine Maleate (ALLERGY PO) Take  by mouth. PRN seasonal allergies      metFORMIN (GLUCOPHAGE) 500 MG Tab Take 500 mg by mouth 2 times a day with meals.      Multiple Vitamins-Minerals (HAIR SKIN NAILS PO) Take 1 Tablet by mouth every day.      levothyroxine (SYNTHROID) 112 MCG Tab Take 1 Tab by mouth every morning before breakfast. 30 Tab 2    vitamin D (CHOLECALCIFEROL) 1000 UNIT TABS Take 1,000 Units by mouth every day.      [DISCONTINUED] metoprolol SR (TOPROL XL) 100 MG TABLET SR 24 HR TAKE ONE TABLET BY MOUTH EVERY DAY 90 Tablet 3    [DISCONTINUED] rivaroxaban (XARELTO) 20 MG Tab tablet Take 1 Tablet by mouth with dinner. Please complete basic metabolic lab and " "keep follow up visit scheduled 5/2/24 to ensure further refills, thank you! 90 Tablet 0    [DISCONTINUED] Coenzyme Q10 (CO Q 10 PO) Take  by mouth.       No facility-administered encounter medications on file as of 5/31/2024.     ROS           Objective     /62 (BP Location: Left arm, Patient Position: Sitting, BP Cuff Size: Adult)   Pulse (!) 54   Resp 18   Ht 1.753 m (5' 9\")   Wt 113 kg (250 lb)   LMP 10/07/2014   SpO2 93%   BMI 36.92 kg/m²     Physical Exam  Constitutional:       General: She is not in acute distress.     Appearance: She is not diaphoretic.   Eyes:      General: No scleral icterus.  Neck:      Vascular: No JVD.   Cardiovascular:      Rate and Rhythm: Normal rate.      Heart sounds: Normal heart sounds. No murmur heard.     No friction rub. No gallop.   Pulmonary:      Effort: No respiratory distress.      Breath sounds: No wheezing or rales.   Abdominal:      General: Bowel sounds are normal.      Palpations: Abdomen is soft.   Musculoskeletal:      Right lower leg: No edema.      Left lower leg: No edema.   Skin:     Findings: No rash.   Neurological:      Mental Status: She is alert. Mental status is at baseline.   Psychiatric:         Mood and Affect: Mood normal.            We reviewed in person the most recent labs    [facility] labs reviewed    Assessment & Plan     1. PAF (paroxysmal atrial fibrillation) (HCC)  rivaroxaban (XARELTO) 20 MG Tab tablet    metoprolol SR (TOPROL XL) 100 MG TABLET SR 24 HR      2. SVT (supraventricular tachycardia) (HCC)        3. Coronary artery disease, non-occlusive        4. Other cardiomyopathy (HCC)        5. Persistent atrial fibrillation (HCC)        6. H/O cardiac radiofrequency ablation 9/26/22 Dr Montejo         7. Bicuspid aortic valve - on TOÑO 9/2022        8. Encounter for monitoring amiodarone therapy  COMP METABOLIC PANEL    THYROID PANEL WITH TSH      9. Dyslipidemia        10. Hypercoagulable state due to persistent atrial fibrillation " (HCC)  CBC WITH DIFFERENTIAL          Medical Decision Making: Today's Assessment/Status/Plan:        It was my pleasure to meet with Ms. Montez.    Blood pressure is well controlled.  She will continue to monitor and eat hearty healthy diet.    Lipids are okay but need to monitor A1C    We addressed the management of atrial fibrillation.  She is on proper anticoagulation cholesterol management and rate or rhythm control as appropriate.  We addressed the potential side effects and laboratory follow-up for these medications.    We addressed the management of chronic anticoagulation at today's visit. She understands the risks and benefits of chronic anticoagulation.  We reviewed and verified the results of annual testing for anemia and kidney function.    We addressed the management of antiarrhythmic medication at today's visit. She understands the risks and benefits of amiodarone therapy. We have specifically made sure that her anticoagulation is appropriate, QTc is appropriate and screened for arrhythmias.   I also stressed the importance of regular labs for thyroid and liver, regular lung exam and regular eye exam. She understands that this is a high risk medication and requires frequent monitoring as well as informing providers for any new medication to make sure it is appropriate for QTC prolongation.      I will see Ms. Montez back in 1 year time and encouraged her to follow up with us over the phone or electronically using my [employer] as issues arise.    It is my pleasure to participate in the care of Ms. Montez.  Please do not hesitate to contact me with questions or concerns.    Young Issa MD PhD [facility]  Cardiologist [facility]    Please note that this dictation was created using voice recognition software. There may be errors I did not discover before finalizing the note.     () Today's E/M visit is associated with medical care services that serve as the  continuing focal point for all needed health care services and/or with medical care services that  are part of ongoing care related to a patient's single, serious condition, or a complex condition: This includes  furnishing services to patients on an ongoing basis that result in care that is personalized  to the patient. The services result in a comprehensive, longitudinal, and continuous  relationship with the patient and involve delivery of team-based care that is accessible, coordinated with other practitioners and providers, and integrated with the broader health  care landscape.

## 2025-03-19 DIAGNOSIS — I25.10 CORONARY ARTERY DISEASE, NON-OCCLUSIVE: ICD-10-CM

## 2025-04-11 DIAGNOSIS — I48.0 PAF (PAROXYSMAL ATRIAL FIBRILLATION) (HCC): ICD-10-CM

## 2025-04-15 NOTE — TELEPHONE ENCOUNTER
Phone Number Called: [phone]    Call outcome: Spoke to patient regarding message below.    Message: Called to discuss. Pt requesting lab orders mailed to her. She has about one month supply of Amiodarone. She will discuss EKG with her PCP on 5/1. Lab appt is next Thursday, asking for lab orders to be mailed. She will call me with location to request labs

## 2025-04-21 NOTE — TELEPHONE ENCOUNTER
Pt called, still has not received lab orders. She provided a fax number and lab orders x4 faxed via [employer]

## 2025-04-29 NOTE — TELEPHONE ENCOUNTER
Caller: Gisel Montez      Topic/issue: Patient was returning our call and was asking for a call back. Please advise.         Callback Number: [phone]      Thank you    -Tommy VERAS

## 2025-04-29 NOTE — TELEPHONE ENCOUNTER
Spoke with pt, she is having EKG today, labs 5/1 and sees PCP 5/8. Will request all records from PCP.

## 2025-05-05 ENCOUNTER — RESULTS FOLLOW-UP (OUTPATIENT)
Dept: CARDIOLOGY | Facility: MEDICAL CENTER | Age: 65
End: 2025-05-05
Payer: COMMERCIAL

## 2025-05-05 RX ORDER — AMIODARONE HYDROCHLORIDE 200 MG/1
200 TABLET ORAL
Qty: 90 TABLET | Refills: 1 | Status: SHIPPED | OUTPATIENT
Start: 2025-05-05

## 2025-05-05 NOTE — TELEPHONE ENCOUNTER
CW: Pt is needing refill on amiodarone, thryroid levels up. Please advise. Still waiting on EKG this week

## 2025-05-05 NOTE — TELEPHONE ENCOUNTER
BASILIA    Caller: Gisel Montez    Topic/issue: FYI - wanted to update us that labs have been completed at [place] in [facility]. It appears we have already received results as CW sent patient [employer] message today, but she wanted me to let Rut know.     Callback Number: [phone]    Thank you,  Melva de santiago

## 2025-05-07 NOTE — TELEPHONE ENCOUNTER
[facility]  [facility]  Brandon Serrato MD  [phone]      May 7, 2025    Colonoscopy Procedure Note  Shira Haynes  :  1957  [facility] Medical Record Number: [id number]    Indications:     Personal history of colon polyps (screening only)  PCP:  Art Bah, APRN - NP  Anesthesia/Sedation: Conscious Sedation/Moderate Sedation/GETA, see notes  Endoscopist:  Dr. Brandon Serrato  Complications:  None  Estimated Blood Loss:  None    Permit:  The indications, risks, benefits and alternatives were reviewed with the patient or their decision maker who was provided an opportunity to ask questions and all questions were answered.  The specific risks of colonoscopy with conscious sedation were reviewed, including but not limited to anesthetic complication, bleeding, adverse drug reaction, missed lesion, infection, IV site reactions, and intestinal perforation which would lead to the need for surgical repair.  Alternatives to colonoscopy including radiographic imaging, observation without testing, or laboratory testing were reviewed including the limitations of those alternatives.  After considering the options and having all their questions answered, the patient or their decision maker provided both verbal and written consent to proceed.        Procedure in Detail:  After obtaining informed consent, positioning of the patient in the left lateral decubitus position, and conduction of a pre-procedure pause or \"time out\" the endoscope was introduced into the anus and advanced to the cecum, which was identified by the ileocecal valve and appendiceal orifice.  The quality of the colonic preparation was good.  A careful inspection was made as the colonoscope was withdrawn, findings and interventions are described below.    Findings:   4mm polyp of the cecum removed with cold snare.  2mm polyp of the transverse colon removed with  Was the patient seen in the last year in this department? Yes    Does patient have an active prescription for medications requested? Yes    Received Request Via: Pharmacy     Requested Prescriptions     Pending Prescriptions Disp Refills   • levothyroxine (SYNTHROID) 112 MCG Tab 30 Tab 11     Sig: Take 1 Tab by mouth every morning before breakfast.

## 2025-05-27 DIAGNOSIS — I48.0 PAF (PAROXYSMAL ATRIAL FIBRILLATION) (HCC): ICD-10-CM

## 2025-05-27 RX ORDER — RIVAROXABAN 20 MG/1
20 TABLET, FILM COATED ORAL
Qty: 90 TABLET | Refills: 0 | Status: SHIPPED | OUTPATIENT
Start: 2025-05-27

## 2025-06-10 ENCOUNTER — OFFICE VISIT (OUTPATIENT)
Dept: CARDIOLOGY | Facility: PHYSICIAN GROUP | Age: 65
End: 2025-06-10
Payer: COMMERCIAL

## 2025-06-10 VITALS
HEART RATE: 50 BPM | RESPIRATION RATE: 16 BRPM | SYSTOLIC BLOOD PRESSURE: 100 MMHG | WEIGHT: 234.4 LBS | HEIGHT: 69 IN | DIASTOLIC BLOOD PRESSURE: 64 MMHG | OXYGEN SATURATION: 95 % | BODY MASS INDEX: 34.72 KG/M2

## 2025-06-10 DIAGNOSIS — I25.2 HISTORY OF NON-ST ELEVATION MYOCARDIAL INFARCTION (NSTEMI): ICD-10-CM

## 2025-06-10 DIAGNOSIS — I48.0 HYPERCOAGULABLE STATE DUE TO PAROXYSMAL ATRIAL FIBRILLATION (HCC): ICD-10-CM

## 2025-06-10 DIAGNOSIS — I25.10 CORONARY ARTERY DISEASE, NON-OCCLUSIVE: ICD-10-CM

## 2025-06-10 DIAGNOSIS — I47.10 SVT (SUPRAVENTRICULAR TACHYCARDIA) (HCC): ICD-10-CM

## 2025-06-10 DIAGNOSIS — I48.0 PAF (PAROXYSMAL ATRIAL FIBRILLATION) (HCC): Primary | ICD-10-CM

## 2025-06-10 DIAGNOSIS — Z79.899 ENCOUNTER FOR MONITORING AMIODARONE THERAPY: ICD-10-CM

## 2025-06-10 DIAGNOSIS — Z51.81 ENCOUNTER FOR MONITORING AMIODARONE THERAPY: ICD-10-CM

## 2025-06-10 DIAGNOSIS — Q23.81 BICUSPID AORTIC VALVE: Chronic | ICD-10-CM

## 2025-06-10 DIAGNOSIS — D68.69 HYPERCOAGULABLE STATE DUE TO PAROXYSMAL ATRIAL FIBRILLATION (HCC): ICD-10-CM

## 2025-06-10 DIAGNOSIS — E78.5 DYSLIPIDEMIA: ICD-10-CM

## 2025-06-10 DIAGNOSIS — Z98.890 H/O CARDIAC RADIOFREQUENCY ABLATION: ICD-10-CM

## 2025-06-10 DIAGNOSIS — Z79.01 CHRONIC ANTICOAGULATION: Chronic | ICD-10-CM

## 2025-06-10 DIAGNOSIS — I42.8 OTHER CARDIOMYOPATHY (HCC): Chronic | ICD-10-CM

## 2025-06-10 PROCEDURE — 3074F SYST BP LT 130 MM HG: CPT | Performed by: INTERNAL MEDICINE

## 2025-06-10 PROCEDURE — 99214 OFFICE O/P EST MOD 30 MIN: CPT | Performed by: INTERNAL MEDICINE

## 2025-06-10 PROCEDURE — 3078F DIAST BP <80 MM HG: CPT | Performed by: INTERNAL MEDICINE

## 2025-06-10 RX ORDER — METOPROLOL SUCCINATE 100 MG/1
100 TABLET, EXTENDED RELEASE ORAL
Qty: 90 TABLET | Refills: 3 | Status: SHIPPED | OUTPATIENT
Start: 2025-06-10

## 2025-06-10 ASSESSMENT — FIBROSIS 4 INDEX: FIB4 SCORE: 4.79

## 2025-06-10 NOTE — PROGRESS NOTES
Chief Complaint   Patient presents with    Atrial Fibrillation     F/V Dx: PAF (paroxysmal atrial fibrillation) (HCC)    Dyslipidemia       Subjective     Gisel Montez is a 64 y.o. female who presents today with her history of cardiomyopathy with A-fib with the ablation     Has had dizziness with positional change    Was in [place] with hypoxia down to 76 with PNA        Past Medical History[1]  Past Surgical History[2]  Family History   Problem Relation Age of Onset    Diabetes Father 68     Social History     Socioeconomic History    Marital status:      Spouse name: Not on file    Number of children: Not on file    Years of education: Not on file    Highest education level: Not on file   Occupational History    Occupation: other     Comment: [employer]   Tobacco Use    Smoking status: Former     Current packs/day: 0.00     Average packs/day: 0.5 packs/day for 41.4 years (20.7 ttl pk-yrs)     Types: Cigarettes     Start date: 1/1/1974     Quit date: 5/29/2015     Years since quitting: 10.0    Smokeless tobacco: Never    Tobacco comments:     occ vape   Vaping Use    Vaping status: Some Days    Substances: Nicotine, 1.8 occasionally   Substance and Sexual Activity    Alcohol use: Yes     Alcohol/week: 3.6 oz     Types: 2 Glasses of wine, 4 Cans of beer per week     Comment: on weekends    Drug use: No    Sexual activity: Yes     Partners: Male   Other Topics Concern    Not on file   Social History Narrative    . Lives in [place], [place]. Works for [employer].     Social Drivers of Health     Financial Resource Strain: Not on file   Food Insecurity: Not on file   Transportation Needs: Not on file   Physical Activity: Not on file   Stress: Not on file   Social Connections: Not on file   Intimate Partner Violence: Not on file   Housing Stability: Not on file     Allergies[3]  Encounter Medications[4]  ROS           Objective     /64 (BP Location: Left arm, Patient Position: Sitting, BP Cuff Size:  "Adult)   Pulse (!) 50   Resp 16   Ht 1.753 m (5' 9\")   Wt 106 kg (234 lb 6.4 oz)   LMP 10/07/2014   SpO2 95%   BMI 34.61 kg/m²     Physical Exam  Constitutional:       General: She is not in acute distress.     Appearance: She is not diaphoretic.   Eyes:      General: No scleral icterus.  Neck:      Vascular: No JVD.   Cardiovascular:      Rate and Rhythm: Normal rate.      Heart sounds: Normal heart sounds. No murmur heard.     No friction rub. No gallop.   Pulmonary:      Effort: No respiratory distress.      Breath sounds: No wheezing or rales.   Abdominal:      General: Bowel sounds are normal.      Palpations: Abdomen is soft.   Musculoskeletal:      Right lower leg: No edema.      Left lower leg: No edema.   Skin:     Findings: No rash.   Neurological:      Mental Status: She is alert. Mental status is at baseline.   Psychiatric:         Mood and Affect: Mood normal.            We reviewed in person the most recent labs  Recent Results (from the past 30 weeks)   CBC WITH DIFFERENTIAL    Collection Time: 04/30/25  9:00 PM   Result Value Ref Range    WBC 4.1 3.4 - 10.8 x10E3/uL    RBC 5.13 3.77 - 5.28 x10E6/uL    Hemoglobin 16.0 (H) 11.1 - 15.9 g/dL    Hematocrit 47.8 (H) 34.0 - 46.6 %    MCV 93 79 - 97 fL    MCH 31.2 26.6 - 33.0 pg    MCHC 33.5 31.5 - 35.7 g/dL    RDW 13.4 11.7 - 15.4 %    Platelet Count 149 (L) 150 - 450 x10E3/uL    Neutrophils-Polys 48 Not Estab. %    Lymphocytes 40 Not Estab. %    Monocytes 9 Not Estab. %    Eosinophils 2 Not Estab. %    Basophils 1 Not Estab. %    Immature Cells CANCELED     Neutrophils (Absolute) 2.0 1.4 - 7.0 x10E3/uL    Lymphs (Absolute) 1.7 0.7 - 3.1 x10E3/uL    Monos (Absolute) 0.4 0.1 - 0.9 x10E3/uL    Eos (Absolute) 0.1 0.0 - 0.4 x10E3/uL    Baso (Absolute) 0.0 0.0 - 0.2 x10E3/uL    Immature Granulocytes 0 Not Estab. %    Immature Granulocytes (abs) 0.0 0.0 - 0.1 x10E3/uL    Nucleated RBC CANCELED     Comments-Diff CANCELED    COMP METABOLIC PANEL    " Collection Time: 04/30/25  9:00 PM   Result Value Ref Range    Glucose 103 (H) 70 - 99 mg/dL    Bun 9 8 - 27 mg/dL    Creatinine 0.71 0.57 - 1.00 mg/dL    eGFR 95 >59 mL/min/1.73    Bun-Creatinine Ratio 13 12 - 28    Sodium 141 134 - 144 mmol/L    Potassium 4.6 3.5 - 5.2 mmol/L    Chloride 103 96 - 106 mmol/L    Co2 17 (L) 20 - 29 mmol/L    Calcium 9.4 8.7 - 10.3 mg/dL    Total Protein 8.3 6.0 - 8.5 g/dL    Albumin 3.9 3.9 - 4.9 g/dL    Globulin 4.4 1.5 - 4.5 g/dL    Total Bilirubin 0.4 0.0 - 1.2 mg/dL    Alkaline Phosphatase 92 44 - 121 IU/L    AST(SGOT) 92 (H) 0 - 40 IU/L    ALT(SGPT) 66 (H) 0 - 32 IU/L   LIPID PANEL    Collection Time: 04/30/25  9:00 PM   Result Value Ref Range    Cholesterol,Tot 149 100 - 199 mg/dL    Triglycerides 136 0 - 149 mg/dL    HDL 37 (L) >39 mg/dL    VLDL Cholesterol Calc 24 5 - 40 mg/dL    LDL Chol Calc (NIH) 88 0 - 99 mg/dL    LDL Calc Comment: CANCELED    THYROID PANEL WITH TSH    Collection Time: 04/30/25  9:00 PM   Result Value Ref Range    TSH 1.630 0.450 - 4.500 uIU/mL    Thyroxin T4 15.7 (H) 4.5 - 12.0 ug/dL    T-Uptake 37 24 - 39 %    Free Thyroxin Index -Fti 5.8 (H) 1.2 - 4.9         Assessment & Plan     1. PAF (paroxysmal atrial fibrillation) ([facility])  EKG      2. Dyslipidemia  EKG    LIPID PANEL      3. H/O cardiac radiofrequency ablation 9/26/22 Dr Montejo         4. Bicuspid aortic valve - on TOÑO 9/2022        5. Encounter for monitoring amiodarone therapy        6. Coronary artery disease, non-occlusive        7. History of non-ST elevation myocardial infarction (NSTEMI)        8. Chronic anticoagulation        9. Hypercoagulable state due to paroxysmal atrial fibrillation (HCC)  CBC WITH DIFFERENTIAL    COMP METABOLIC PANEL          Medical Decision Making: Today's Assessment/Status/Plan:        It was my pleasure to meet with Ms. Montez.    We addressed the management of congestive heart failure. We addressed the potential side effects and laboratory follow-up for these  medications. She is on proper mineralacorticoid, angiotensin/ace inhibition with neprilysin inhibition, SGLTs inhibitor and beta-blockers if appropriate.  We addressed the potential side effects and regular laboratory follow-up for these medications.    HAS RECOVERED WITH RHYTHM CONTROL    We addressed the management of atrial fibrillation.  She is on proper anticoagulation and rate or rhythm control as appropriate.  We addressed the potential side effects and laboratory follow-up for these medications.    We addressed the management of chronic anticoagulation at today's visit. She understands the risks and benefits of chronic anticoagulation.  We reviewed and verified the results of annual testing for anemia and kidney function.    STOP AMIODARONE AND IF HAS RECURRENT AFIB WOULD DO SOTALOL OR TIKOSYN ADMISSION AND REPEAT ABLATION    I will see Ms. Montez back in 1 year time and encouraged her to follow up with us over the phone or electronically using my MyChart as issues arise.    It is my pleasure to participate in the care of Ms. Montez.  Please do not hesitate to contact me with questions or concerns.    Young Issa MD PhD [facility]  Cardiologist [facility]    Please note that this dictation was created using voice recognition software. There may be errors I did not discover before finalizing the note.     () Today's E/M visit is associated with medical care services that serve as the continuing focal point for all needed health care services and/or with medical care services that  are part of ongoing care related to a patient's single, serious condition, or a complex condition: This includes  furnishing services to patients on an ongoing basis that result in care that is personalized  to the patient. The services result in a comprehensive, longitudinal, and continuous  relationship with the patient and involve delivery of team-based care that is accessible,  "coordinated with other practitioners and providers, and integrated with the broader health  care landscape.                        [1]   Past Medical History:  Diagnosis Date    ACTIVE SMOKER      4-10 sticks/40    Acute bronchitis due to infection 10/03/2016    Acute MI, inferior wall (HCC)      Atrial fibrillation (HCC)     Bicuspid aortic valve - on TOÑO 9/2022     CAD (coronary artery disease)      Cardiomyopathy (HCC)     Chickenpox     Chronic anticoagulation     Class 2 obesity due to excess calories without serious comorbidity in adult 09/10/2018    Hyperglycemia 10/31/2016    Hypothyroidism      Mumps     Obesity     PAF (paroxysmal atrial fibrillation) (HCC)      Palpitations      Tonsillitis     Type 2 diabetes mellitus without complication (HCC) 08/28/2017    Vitamin D deficiency 02/27/2017    Wears glasses    [2]   Past Surgical History:  Procedure Laterality Date    GYN SURGERY      tubal ligation    OTHER      choleycystectomy    OTHER CARDIAC SURGERY      cardioversion    SEPTAL RECONSTRUCTION     [3]   Allergies  Allergen Reactions    Other Food      \"CELERY\". Became violently ill, tongue swelled and hard to breath.    Atorvastatin      LDL naturally normal continue to reassess    Doxycycline      Myocarditis   [4]   Outpatient Encounter Medications as of 6/10/2025   Medication Sig Dispense Refill    XARELTO 20 MG Tab tablet TAKE ONE TABLET BY MOUTH EVERY DAY 90 Tablet 0    [DISCONTINUED] amiodarone (CORDARONE) 200 MG Tab TAKE ONE TABLET BY MOUTH EVERY DAY 90 Tablet 1    metoprolol SR (TOPROL XL) 100 MG TABLET SR 24 HR Take 1 Tablet by mouth every day. 90 Tablet 3    Chlorpheniramine Maleate (ALLERGY PO) Take  by mouth. PRN seasonal allergies      metFORMIN (GLUCOPHAGE) 500 MG Tab Take 500 mg by mouth 2 times a day with meals.      [DISCONTINUED] Multiple Vitamins-Minerals (HAIR SKIN NAILS PO) Take 1 Tablet by mouth every day.      levothyroxine (SYNTHROID) 112 MCG Tab Take 1 Tab by mouth every " morning before breakfast. 30 Tab 2    vitamin D (CHOLECALCIFEROL) 1000 UNIT TABS Take 1,000 Units by mouth every day.       No facility-administered encounter medications on file as of 6/10/2025.

## (undated) DEVICE — SUTURE GENERAL

## (undated) DEVICE — TOWEL STOP TIMEOUT SAFETY FLAG (40EA/CA)

## (undated) DEVICE — CHLORAPREP 26 ML APPLICATOR - ORANGE TINT(25/CA)

## (undated) DEVICE — SET LEADWIRE 5 LEAD BEDSIDE DISPOSABLE ECG (1SET OF 5/EA)